# Patient Record
Sex: FEMALE | Race: BLACK OR AFRICAN AMERICAN | NOT HISPANIC OR LATINO | URBAN - METROPOLITAN AREA
[De-identification: names, ages, dates, MRNs, and addresses within clinical notes are randomized per-mention and may not be internally consistent; named-entity substitution may affect disease eponyms.]

---

## 2017-05-01 ENCOUNTER — OUTPATIENT (OUTPATIENT)
Dept: OUTPATIENT SERVICES | Facility: HOSPITAL | Age: 77
LOS: 1 days | End: 2017-05-01
Payer: MEDICAID

## 2017-05-04 DIAGNOSIS — R69 ILLNESS, UNSPECIFIED: ICD-10-CM

## 2017-06-01 PROCEDURE — G9001: CPT

## 2018-02-01 ENCOUNTER — OUTPATIENT (OUTPATIENT)
Dept: OUTPATIENT SERVICES | Facility: HOSPITAL | Age: 78
LOS: 1 days | End: 2018-02-01
Payer: MEDICAID

## 2018-02-06 ENCOUNTER — INPATIENT (INPATIENT)
Facility: HOSPITAL | Age: 78
LOS: 2 days | Discharge: HOME HEALTH | End: 2018-02-09
Attending: INTERNAL MEDICINE | Admitting: INTERNAL MEDICINE
Payer: MEDICARE

## 2018-02-06 ENCOUNTER — EMERGENCY (EMERGENCY)
Facility: HOSPITAL | Age: 78
LOS: 1 days | Discharge: STILL ADMITTED OR ADMINISTRATIVE | End: 2018-02-06
Admitting: EMERGENCY MEDICINE

## 2018-02-06 VITALS
TEMPERATURE: 98 F | OXYGEN SATURATION: 100 % | HEART RATE: 71 BPM | SYSTOLIC BLOOD PRESSURE: 180 MMHG | DIASTOLIC BLOOD PRESSURE: 63 MMHG | RESPIRATION RATE: 18 BRPM

## 2018-02-06 VITALS
RESPIRATION RATE: 15 BRPM | OXYGEN SATURATION: 100 % | DIASTOLIC BLOOD PRESSURE: 60 MMHG | SYSTOLIC BLOOD PRESSURE: 147 MMHG | HEART RATE: 80 BPM | TEMPERATURE: 98 F

## 2018-02-06 DIAGNOSIS — Z90.710 ACQUIRED ABSENCE OF BOTH CERVIX AND UTERUS: Chronic | ICD-10-CM

## 2018-02-06 DIAGNOSIS — Z90.49 ACQUIRED ABSENCE OF OTHER SPECIFIED PARTS OF DIGESTIVE TRACT: Chronic | ICD-10-CM

## 2018-02-06 LAB
ALBUMIN SERPL ELPH-MCNC: 4 G/DL — SIGNIFICANT CHANGE UP (ref 3.3–5)
ALP SERPL-CCNC: 85 U/L — SIGNIFICANT CHANGE UP (ref 40–120)
ALT FLD-CCNC: 10 U/L — SIGNIFICANT CHANGE UP (ref 4–33)
APTT BLD: 25.6 SEC — LOW (ref 27.5–37.4)
AST SERPL-CCNC: 14 U/L — SIGNIFICANT CHANGE UP (ref 4–32)
BASOPHILS # BLD AUTO: 0.08 K/UL — SIGNIFICANT CHANGE UP (ref 0–0.2)
BASOPHILS NFR BLD AUTO: 0.7 % — SIGNIFICANT CHANGE UP (ref 0–2)
BILIRUB SERPL-MCNC: 0.5 MG/DL — SIGNIFICANT CHANGE UP (ref 0.2–1.2)
BUN SERPL-MCNC: 8 MG/DL — SIGNIFICANT CHANGE UP (ref 7–23)
CALCIUM SERPL-MCNC: 9.4 MG/DL — SIGNIFICANT CHANGE UP (ref 8.4–10.5)
CHLORIDE SERPL-SCNC: 99 MMOL/L — SIGNIFICANT CHANGE UP (ref 98–107)
CO2 SERPL-SCNC: 29 MMOL/L — SIGNIFICANT CHANGE UP (ref 22–31)
CREAT SERPL-MCNC: 0.75 MG/DL — SIGNIFICANT CHANGE UP (ref 0.5–1.3)
EOSINOPHIL # BLD AUTO: 0.13 K/UL — SIGNIFICANT CHANGE UP (ref 0–0.5)
EOSINOPHIL NFR BLD AUTO: 1.1 % — SIGNIFICANT CHANGE UP (ref 0–6)
GLUCOSE SERPL-MCNC: 185 MG/DL — HIGH (ref 70–99)
HCT VFR BLD CALC: 38.4 % — SIGNIFICANT CHANGE UP (ref 34.5–45)
HGB BLD-MCNC: 12.4 G/DL — SIGNIFICANT CHANGE UP (ref 11.5–15.5)
IMM GRANULOCYTES # BLD AUTO: 0.05 # — SIGNIFICANT CHANGE UP
IMM GRANULOCYTES NFR BLD AUTO: 0.4 % — SIGNIFICANT CHANGE UP (ref 0–1.5)
INR BLD: 1.01 — SIGNIFICANT CHANGE UP (ref 0.88–1.17)
LYMPHOCYTES # BLD AUTO: 18.4 % — SIGNIFICANT CHANGE UP (ref 13–44)
LYMPHOCYTES # BLD AUTO: 2.21 K/UL — SIGNIFICANT CHANGE UP (ref 1–3.3)
MCHC RBC-ENTMCNC: 26.4 PG — LOW (ref 27–34)
MCHC RBC-ENTMCNC: 32.3 % — SIGNIFICANT CHANGE UP (ref 32–36)
MCV RBC AUTO: 81.7 FL — SIGNIFICANT CHANGE UP (ref 80–100)
MONOCYTES # BLD AUTO: 1.09 K/UL — HIGH (ref 0–0.9)
MONOCYTES NFR BLD AUTO: 9.1 % — SIGNIFICANT CHANGE UP (ref 2–14)
NEUTROPHILS # BLD AUTO: 8.46 K/UL — HIGH (ref 1.8–7.4)
NEUTROPHILS NFR BLD AUTO: 70.3 % — SIGNIFICANT CHANGE UP (ref 43–77)
NRBC # FLD: 0 — SIGNIFICANT CHANGE UP
PLATELET # BLD AUTO: 278 K/UL — SIGNIFICANT CHANGE UP (ref 150–400)
PMV BLD: 11.4 FL — SIGNIFICANT CHANGE UP (ref 7–13)
POTASSIUM SERPL-MCNC: 3.8 MMOL/L — SIGNIFICANT CHANGE UP (ref 3.5–5.3)
POTASSIUM SERPL-SCNC: 3.8 MMOL/L — SIGNIFICANT CHANGE UP (ref 3.5–5.3)
PROT SERPL-MCNC: 7.4 G/DL — SIGNIFICANT CHANGE UP (ref 6–8.3)
PROTHROM AB SERPL-ACNC: 11.6 SEC — SIGNIFICANT CHANGE UP (ref 9.8–13.1)
RBC # BLD: 4.7 M/UL — SIGNIFICANT CHANGE UP (ref 3.8–5.2)
RBC # FLD: 13.3 % — SIGNIFICANT CHANGE UP (ref 10.3–14.5)
SODIUM SERPL-SCNC: 140 MMOL/L — SIGNIFICANT CHANGE UP (ref 135–145)
WBC # BLD: 12.02 K/UL — HIGH (ref 3.8–10.5)
WBC # FLD AUTO: 12.02 K/UL — HIGH (ref 3.8–10.5)

## 2018-02-06 PROCEDURE — 73060 X-RAY EXAM OF HUMERUS: CPT | Mod: 26,LT

## 2018-02-06 PROCEDURE — 71045 X-RAY EXAM CHEST 1 VIEW: CPT | Mod: 26

## 2018-02-06 PROCEDURE — 72170 X-RAY EXAM OF PELVIS: CPT | Mod: 26

## 2018-02-06 PROCEDURE — 70450 CT HEAD/BRAIN W/O DYE: CPT | Mod: 26

## 2018-02-06 PROCEDURE — 73030 X-RAY EXAM OF SHOULDER: CPT | Mod: 26,LT

## 2018-02-06 RX ORDER — SODIUM CHLORIDE 9 MG/ML
1000 INJECTION INTRAMUSCULAR; INTRAVENOUS; SUBCUTANEOUS ONCE
Qty: 0 | Refills: 0 | Status: COMPLETED | OUTPATIENT
Start: 2018-02-06 | End: 2018-02-06

## 2018-02-06 RX ORDER — MORPHINE SULFATE 50 MG/1
4 CAPSULE, EXTENDED RELEASE ORAL ONCE
Qty: 0 | Refills: 0 | Status: DISCONTINUED | OUTPATIENT
Start: 2018-02-06 | End: 2018-02-06

## 2018-02-06 RX ADMIN — SODIUM CHLORIDE 1000 MILLILITER(S): 9 INJECTION INTRAMUSCULAR; INTRAVENOUS; SUBCUTANEOUS at 22:29

## 2018-02-06 RX ADMIN — MORPHINE SULFATE 4 MILLIGRAM(S): 50 CAPSULE, EXTENDED RELEASE ORAL at 22:10

## 2018-02-06 NOTE — ED ADULT NURSE NOTE - CHIEF COMPLAINT QUOTE
PT brought in by EMS for mechanical fall with injury to L side.  Pt LWOBE from [facility] ED to go to [facility] earlier today and while at [facility], pt had a syncopal episode with urinary incontinence.  As per family, pt is back to baseline "for the most part but still a little groggy".  PMHx:  HTN, DM

## 2018-02-06 NOTE — ED ADULT TRIAGE NOTE - CHIEF COMPLAINT QUOTE
PT brought in by EMS for mechanical fall with injury to L side.  Pt LWOBE from [facility] ED to go to [facility] earlier today and while at [facility], pt had a syncopal episode with urinary incontinence.  As per family, pt is back to baseline "for the most part but still a little groggy".  PMHx:  HTN, DM

## 2018-02-06 NOTE — ED PROVIDER NOTE - CARDIAC, MLM
Reviewed report generated by the [facility]. Does not demonstrate aberrancies or inconsistencies with regard to the prescribing of controlled medications to this patient by other providers. Last filled Ativan 12/26/2017 per . Normal rate, regular rhythm.  Heart sounds S1, S2.  No murmurs, rubs or gallops.

## 2018-02-06 NOTE — ED PROVIDER NOTE - MEDICAL DECISION MAKING DETAILS
pt w/ s/p fall and syncope, concern humerus fx, eval ICH, cranial fx, admit for syncope evaluation. pt w/ s/p fall and syncope, concern humerus fx, eval ICH, cranial fx, admit for syncope evaluation. - low suspicion for disssection, acs

## 2018-02-06 NOTE — ED PROVIDER NOTE - OBJECTIVE STATEMENT
76 y/o F w/ Hx DM, HTN pf fall.  Pt was doing laundry in AM, developed L sided lower back pain, tried heating pad w/ minimal relief, was walking L leg locked and fell down landing on L shoulder.  Severe pain to L shoulder, increased w/ movement.  Came to ED, LWOBE, went to urgent care, while standing for xray, sudden LOC, unknown head trauma, + urinary incontinence, returned to baseline immediately following.  No similar prior events.  No recent fever, cough, CP, SOB, urinary/bowel changes.  On ASA

## 2018-02-06 NOTE — ED PROVIDER NOTE - ATTENDING CONTRIBUTION TO CARE
agree with resident note  76 y/o F w/ Hx DM, HTN who presents for syncopal episode.  Was doing laundry this morning when left leg gave out and she fell landing on left shoulder.  Came to ED to get checked out, waited in waiting room for a prolonged time and left to go to [facility].  Did not eat during wait (per family that is normal for her)  While standing for xray at [facility] had syncopal episode.  FS checked there was normal.    PE: uncomfortable due to pain in left shoulder; NCAT; VSS; CTAB/L; s1 s2 no m/r/g abd soft/NT/ND ext: no edema Neuro: CNs intact 5/5 motor UE and LE; sensation intact; gait stable

## 2018-02-06 NOTE — ED ADULT NURSE NOTE - CHIEF COMPLAINT QUOTE
Pt c/o left lower back pain since last night.  Was walking today and left leg gave out and she fell onto left side.  C/O left arm and LLE pain.  No LOC.  Appears comfortable

## 2018-02-06 NOTE — ED PROVIDER NOTE - PROGRESS NOTE DETAILS
pt now more confused than baseline, no focal deficits - d/w CT, requested priority CT head [facility] care pt, d/w A team, text page telePA

## 2018-02-06 NOTE — ED ADULT NURSE NOTE - OBJECTIVE STATEMENT
pt received to room 22 pt comes to ED pt is A&0x3 pt comes to ED for possible syncopal episode. pt was seen at [facility] but Lwobed pt went to urgent care after and had a possible syncopal episode. pt states he leg buckled and she fell down. pt is complaining of back pain L arm pain pt VSS 20 g placed in R AC labs were drawn and sent VSS Will continue to monitor the pt

## 2018-02-07 DIAGNOSIS — G47.33 OBSTRUCTIVE SLEEP APNEA (ADULT) (PEDIATRIC): ICD-10-CM

## 2018-02-07 DIAGNOSIS — I10 ESSENTIAL (PRIMARY) HYPERTENSION: ICD-10-CM

## 2018-02-07 DIAGNOSIS — R55 SYNCOPE AND COLLAPSE: ICD-10-CM

## 2018-02-07 DIAGNOSIS — Z29.9 ENCOUNTER FOR PROPHYLACTIC MEASURES, UNSPECIFIED: ICD-10-CM

## 2018-02-07 DIAGNOSIS — E11.9 TYPE 2 DIABETES MELLITUS WITHOUT COMPLICATIONS: ICD-10-CM

## 2018-02-07 DIAGNOSIS — Z79.899 OTHER LONG TERM (CURRENT) DRUG THERAPY: ICD-10-CM

## 2018-02-07 LAB
ALBUMIN SERPL ELPH-MCNC: 3.8 G/DL — SIGNIFICANT CHANGE UP (ref 3.3–5)
ALP SERPL-CCNC: 77 U/L — SIGNIFICANT CHANGE UP (ref 40–120)
ALT FLD-CCNC: 8 U/L — SIGNIFICANT CHANGE UP (ref 4–33)
APPEARANCE UR: CLEAR — SIGNIFICANT CHANGE UP
AST SERPL-CCNC: 14 U/L — SIGNIFICANT CHANGE UP (ref 4–32)
BASOPHILS # BLD AUTO: 0.07 K/UL — SIGNIFICANT CHANGE UP (ref 0–0.2)
BASOPHILS NFR BLD AUTO: 0.6 % — SIGNIFICANT CHANGE UP (ref 0–2)
BILIRUB SERPL-MCNC: 0.6 MG/DL — SIGNIFICANT CHANGE UP (ref 0.2–1.2)
BILIRUB UR-MCNC: NEGATIVE — SIGNIFICANT CHANGE UP
BLOOD UR QL VISUAL: NEGATIVE — SIGNIFICANT CHANGE UP
BUN SERPL-MCNC: 8 MG/DL — SIGNIFICANT CHANGE UP (ref 7–23)
CALCIUM SERPL-MCNC: 8.9 MG/DL — SIGNIFICANT CHANGE UP (ref 8.4–10.5)
CHLORIDE SERPL-SCNC: 100 MMOL/L — SIGNIFICANT CHANGE UP (ref 98–107)
CHOLEST SERPL-MCNC: 172 MG/DL — SIGNIFICANT CHANGE UP (ref 120–199)
CK MB BLD-MCNC: 1.2 NG/ML — SIGNIFICANT CHANGE UP (ref 1–4.7)
CK MB BLD-MCNC: 1.23 NG/ML — SIGNIFICANT CHANGE UP (ref 1–4.7)
CK MB BLD-MCNC: SIGNIFICANT CHANGE UP (ref 0–2.5)
CK SERPL-CCNC: 113 U/L — SIGNIFICANT CHANGE UP (ref 25–170)
CK SERPL-CCNC: 99 U/L — SIGNIFICANT CHANGE UP (ref 25–170)
CO2 SERPL-SCNC: 26 MMOL/L — SIGNIFICANT CHANGE UP (ref 22–31)
COLOR SPEC: SIGNIFICANT CHANGE UP
CREAT SERPL-MCNC: 0.73 MG/DL — SIGNIFICANT CHANGE UP (ref 0.5–1.3)
EOSINOPHIL # BLD AUTO: 0.04 K/UL — SIGNIFICANT CHANGE UP (ref 0–0.5)
EOSINOPHIL NFR BLD AUTO: 0.3 % — SIGNIFICANT CHANGE UP (ref 0–6)
GLUCOSE BLDC GLUCOMTR-MCNC: 121 MG/DL — HIGH (ref 70–99)
GLUCOSE BLDC GLUCOMTR-MCNC: 142 MG/DL — HIGH (ref 70–99)
GLUCOSE BLDC GLUCOMTR-MCNC: 205 MG/DL — HIGH (ref 70–99)
GLUCOSE SERPL-MCNC: 209 MG/DL — HIGH (ref 70–99)
GLUCOSE UR-MCNC: 50 — HIGH
HCT VFR BLD CALC: 35.8 % — SIGNIFICANT CHANGE UP (ref 34.5–45)
HDLC SERPL-MCNC: 68 MG/DL — HIGH (ref 45–65)
HGB BLD-MCNC: 11.6 G/DL — SIGNIFICANT CHANGE UP (ref 11.5–15.5)
IMM GRANULOCYTES # BLD AUTO: 0.05 # — SIGNIFICANT CHANGE UP
IMM GRANULOCYTES NFR BLD AUTO: 0.4 % — SIGNIFICANT CHANGE UP (ref 0–1.5)
KETONES UR-MCNC: NEGATIVE — SIGNIFICANT CHANGE UP
LEUKOCYTE ESTERASE UR-ACNC: HIGH
LIPID PNL WITH DIRECT LDL SERPL: 111 MG/DL — SIGNIFICANT CHANGE UP
LYMPHOCYTES # BLD AUTO: 17.9 % — SIGNIFICANT CHANGE UP (ref 13–44)
LYMPHOCYTES # BLD AUTO: 2.21 K/UL — SIGNIFICANT CHANGE UP (ref 1–3.3)
MCHC RBC-ENTMCNC: 26.5 PG — LOW (ref 27–34)
MCHC RBC-ENTMCNC: 32.4 % — SIGNIFICANT CHANGE UP (ref 32–36)
MCV RBC AUTO: 81.7 FL — SIGNIFICANT CHANGE UP (ref 80–100)
MONOCYTES # BLD AUTO: 0.78 K/UL — SIGNIFICANT CHANGE UP (ref 0–0.9)
MONOCYTES NFR BLD AUTO: 6.3 % — SIGNIFICANT CHANGE UP (ref 2–14)
NEUTROPHILS # BLD AUTO: 9.23 K/UL — HIGH (ref 1.8–7.4)
NEUTROPHILS NFR BLD AUTO: 74.5 % — SIGNIFICANT CHANGE UP (ref 43–77)
NITRITE UR-MCNC: NEGATIVE — SIGNIFICANT CHANGE UP
NON-SQ EPI CELLS # UR AUTO: <1 — SIGNIFICANT CHANGE UP
NRBC # FLD: 0 — SIGNIFICANT CHANGE UP
PH UR: 8 — SIGNIFICANT CHANGE UP (ref 4.6–8)
PLATELET # BLD AUTO: 253 K/UL — SIGNIFICANT CHANGE UP (ref 150–400)
PMV BLD: 11.5 FL — SIGNIFICANT CHANGE UP (ref 7–13)
POTASSIUM SERPL-MCNC: 4.2 MMOL/L — SIGNIFICANT CHANGE UP (ref 3.5–5.3)
POTASSIUM SERPL-SCNC: 4.2 MMOL/L — SIGNIFICANT CHANGE UP (ref 3.5–5.3)
PROT SERPL-MCNC: 6.9 G/DL — SIGNIFICANT CHANGE UP (ref 6–8.3)
PROT UR-MCNC: NEGATIVE MG/DL — SIGNIFICANT CHANGE UP
RBC # BLD: 4.38 M/UL — SIGNIFICANT CHANGE UP (ref 3.8–5.2)
RBC # FLD: 13.2 % — SIGNIFICANT CHANGE UP (ref 10.3–14.5)
RBC CASTS # UR COMP ASSIST: SIGNIFICANT CHANGE UP (ref 0–?)
SODIUM SERPL-SCNC: 139 MMOL/L — SIGNIFICANT CHANGE UP (ref 135–145)
SP GR SPEC: 1.01 — SIGNIFICANT CHANGE UP (ref 1–1.04)
SQUAMOUS # UR AUTO: SIGNIFICANT CHANGE UP
TRIGL SERPL-MCNC: 62 MG/DL — SIGNIFICANT CHANGE UP (ref 10–149)
TROPONIN T SERPL-MCNC: < 0.06 NG/ML — SIGNIFICANT CHANGE UP (ref 0–0.06)
TROPONIN T SERPL-MCNC: < 0.06 NG/ML — SIGNIFICANT CHANGE UP (ref 0–0.06)
UROBILINOGEN FLD QL: NORMAL MG/DL — SIGNIFICANT CHANGE UP
WBC # BLD: 12.38 K/UL — HIGH (ref 3.8–10.5)
WBC # FLD AUTO: 12.38 K/UL — HIGH (ref 3.8–10.5)
WBC UR QL: HIGH (ref 0–?)

## 2018-02-07 PROCEDURE — 93880 EXTRACRANIAL BILAT STUDY: CPT | Mod: 26

## 2018-02-07 PROCEDURE — 99223 1ST HOSP IP/OBS HIGH 75: CPT | Mod: GC

## 2018-02-07 RX ORDER — GLUCAGON INJECTION, SOLUTION 0.5 MG/.1ML
1 INJECTION, SOLUTION SUBCUTANEOUS ONCE
Qty: 0 | Refills: 0 | Status: DISCONTINUED | OUTPATIENT
Start: 2018-02-07 | End: 2018-02-09

## 2018-02-07 RX ORDER — DEXTROSE 50 % IN WATER 50 %
12.5 SYRINGE (ML) INTRAVENOUS ONCE
Qty: 0 | Refills: 0 | Status: DISCONTINUED | OUTPATIENT
Start: 2018-02-07 | End: 2018-02-09

## 2018-02-07 RX ORDER — INSULIN LISPRO 100/ML
VIAL (ML) SUBCUTANEOUS AT BEDTIME
Qty: 0 | Refills: 0 | Status: DISCONTINUED | OUTPATIENT
Start: 2018-02-07 | End: 2018-02-09

## 2018-02-07 RX ORDER — INSULIN LISPRO 100/ML
VIAL (ML) SUBCUTANEOUS
Qty: 0 | Refills: 0 | Status: DISCONTINUED | OUTPATIENT
Start: 2018-02-07 | End: 2018-02-09

## 2018-02-07 RX ORDER — DEXTROSE 50 % IN WATER 50 %
25 SYRINGE (ML) INTRAVENOUS ONCE
Qty: 0 | Refills: 0 | Status: DISCONTINUED | OUTPATIENT
Start: 2018-02-07 | End: 2018-02-09

## 2018-02-07 RX ORDER — ENOXAPARIN SODIUM 100 MG/ML
40 INJECTION SUBCUTANEOUS EVERY 24 HOURS
Qty: 0 | Refills: 0 | Status: DISCONTINUED | OUTPATIENT
Start: 2018-02-07 | End: 2018-02-09

## 2018-02-07 RX ORDER — DEXTROSE 50 % IN WATER 50 %
1 SYRINGE (ML) INTRAVENOUS ONCE
Qty: 0 | Refills: 0 | Status: DISCONTINUED | OUTPATIENT
Start: 2018-02-07 | End: 2018-02-09

## 2018-02-07 RX ORDER — SODIUM CHLORIDE 9 MG/ML
1000 INJECTION, SOLUTION INTRAVENOUS
Qty: 0 | Refills: 0 | Status: DISCONTINUED | OUTPATIENT
Start: 2018-02-07 | End: 2018-02-09

## 2018-02-07 RX ORDER — ACETAMINOPHEN 500 MG
650 TABLET ORAL EVERY 6 HOURS
Qty: 0 | Refills: 0 | Status: DISCONTINUED | OUTPATIENT
Start: 2018-02-07 | End: 2018-02-09

## 2018-02-07 RX ORDER — ASPIRIN/CALCIUM CARB/MAGNESIUM 324 MG
81 TABLET ORAL DAILY
Qty: 0 | Refills: 0 | Status: DISCONTINUED | OUTPATIENT
Start: 2018-02-07 | End: 2018-02-09

## 2018-02-07 RX ORDER — LISINOPRIL 2.5 MG/1
20 TABLET ORAL DAILY
Qty: 0 | Refills: 0 | Status: DISCONTINUED | OUTPATIENT
Start: 2018-02-07 | End: 2018-02-09

## 2018-02-07 RX ORDER — ATORVASTATIN CALCIUM 80 MG/1
40 TABLET, FILM COATED ORAL AT BEDTIME
Qty: 0 | Refills: 0 | Status: DISCONTINUED | OUTPATIENT
Start: 2018-02-07 | End: 2018-02-09

## 2018-02-07 RX ORDER — AMLODIPINE BESYLATE 2.5 MG/1
10 TABLET ORAL DAILY
Qty: 0 | Refills: 0 | Status: DISCONTINUED | OUTPATIENT
Start: 2018-02-07 | End: 2018-02-09

## 2018-02-07 RX ADMIN — LISINOPRIL 20 MILLIGRAM(S): 2.5 TABLET ORAL at 07:02

## 2018-02-07 RX ADMIN — Medication 650 MILLIGRAM(S): at 13:50

## 2018-02-07 RX ADMIN — MORPHINE SULFATE 4 MILLIGRAM(S): 50 CAPSULE, EXTENDED RELEASE ORAL at 00:30

## 2018-02-07 RX ADMIN — Medication 650 MILLIGRAM(S): at 13:25

## 2018-02-07 RX ADMIN — ATORVASTATIN CALCIUM 40 MILLIGRAM(S): 80 TABLET, FILM COATED ORAL at 21:20

## 2018-02-07 RX ADMIN — AMLODIPINE BESYLATE 10 MILLIGRAM(S): 2.5 TABLET ORAL at 07:02

## 2018-02-07 RX ADMIN — ENOXAPARIN SODIUM 40 MILLIGRAM(S): 100 INJECTION SUBCUTANEOUS at 21:19

## 2018-02-07 RX ADMIN — Medication 81 MILLIGRAM(S): at 13:50

## 2018-02-07 NOTE — H&P ADULT - PROBLEM SELECTOR PLAN 1
-CTH within normal limits and prelim reads of x-rays are WNL  -continue with telemetry monitoring to r/o arrhythmia   -Rodger negative x 2; patient without chest pain  -check orthostatic vital signs, monitor VS  -less likely seizure; no seizure history and history not c/w seizure  -consider vasovagal syncope in setting of long standing/wait times in urgent care and ER  -consider hypoglycemia given patient on multiple oral hypoglycemics and only consumed breakfast  -physical therapy evaluation. F/u TTE however consider obtaining records from OSH as patient reports having complete work up before. -CTH within normal limits and prelim reads of x-rays are WNL  -continue with telemetry monitoring to r/o arrhythmia   -Rodger negative x 2; patient without chest pain  -check orthostatic vital signs, monitor VS  -less likely seizure; no seizure history and history not c/w seizure  -consider vasovagal syncope in setting of long standing/wait times in urgent care and ER  -consider hypoglycemia given patient on multiple oral hypoglycemics and only consumed breakfast  -physical therapy evaluation. F/u TTE however consider obtaining records from OSH as patient reports having complete work up before.  -obtain collateral info from pt daughter regarding syncopal episode

## 2018-02-07 NOTE — H&P ADULT - PROBLEM SELECTOR PLAN 2
-hold oral hypoglycemic medications  -insulin sliding scale  -consistent carbohydrate diet  -ASCVD risk 36.9%; patient should be on high dose statin.

## 2018-02-07 NOTE — CONSULT NOTE ADULT - SUBJECTIVE AND OBJECTIVE BOX
CHIEF COMPLAINT:Patient is a 77y old  Female who presents with a chief complaint of syncope (07 Feb 2018 05:26)      HISTORY OF PRESENT ILLNESS:  77F with T2DM, HTN, osteoporosis, EDUAR on CPAP, diverticulosis s/p resection, hx hysterectomy admitted with syncope. she states she went to do laundry and on carrying the laundry basket felt that she could not move her leg and fell down on her left shoulder. She then came to the ER and reports waiting several hours without being seen and thus went to urgent care where she was standing for chest x-ray and passed out  no cp or sob   occ dizziness   PAST MEDICAL & SURGICAL HISTORY:  HTN  Dyslipidemia  Diabetes mellitus type II  S/P hysterectomy  S/P appendectomy          MEDICATIONS:  amLODIPine   Tablet 10 milliGRAM(s) Oral daily  aspirin enteric coated 81 milliGRAM(s) Oral daily  enoxaparin Injectable 40 milliGRAM(s) SubCutaneous every 24 hours  lisinopril 20 milliGRAM(s) Oral daily        acetaminophen   Tablet. 650 milliGRAM(s) Oral every 6 hours PRN      atorvastatin 40 milliGRAM(s) Oral at bedtime  dextrose 50% Injectable 12.5 Gram(s) IV Push once  dextrose 50% Injectable 25 Gram(s) IV Push once  dextrose 50% Injectable 25 Gram(s) IV Push once  dextrose Gel 1 Dose(s) Oral once PRN  glucagon  Injectable 1 milliGRAM(s) IntraMuscular once PRN  insulin lispro (HumaLOG) corrective regimen sliding scale   SubCutaneous three times a day before meals  insulin lispro (HumaLOG) corrective regimen sliding scale   SubCutaneous at bedtime    dextrose 5%. 1000 milliLiter(s) IV Continuous <Continuous>      FAMILY HISTORY:  No pertinent family history in first degree relatives      Non-contributory    SOCIAL HISTORY:    No tobacco, drugs or etoh    Allergies    No Known Allergies    Intolerances    	    REVIEW OF SYSTEMS:  as above  The rest of the 14 points ROS reviewed and except above they are unremarkable.        PHYSICAL EXAM:  T(C): 36.7 (02-06-18 @ 22:03), Max: 36.9 (02-06-18 @ 21:11)  HR: 68 (02-07-18 @ 11:43) (59 - 81)  BP: 155/60 (02-07-18 @ 11:43) (140/60 - 180/63)  RR: 16 (02-07-18 @ 11:43) (15 - 18)  SpO2: 100% (02-07-18 @ 11:43) (100% - 100%)  Wt(kg): --  I&O's Summary    JVP: Normal  Neck: supple  Lung: clear   CV: S1 S2 , Murmur: 3/6 renaldo at rusb   Abd: soft  Ext: No edema  neuro: Awake / alert  Psych: flat affect  Skin: normal     LABS/DATA:    TELEMETRY: 	    ECG:  	< from: 12 Lead ECG (02.06.18 @ 21:31) >    Diagnosis Line Normal sinus rhythm  Nonspecific T wave abnormality  Abnormal ECG    < end of copied text >     	  CARDIAC MARKERS:  Troponin T, Serum: < 0.06 ng/mL (02-07 @ 04:12)  Troponin T, Serum: < 0.06 ng/mL (02-06 @ 21:57)      CKMB: 1.20 ng/mL (02-07 @ 04:12)  CKMB: 1.23 ng/mL (02-06 @ 21:57)    CKMB Relative Index: Test not performed (02-06 @ 21:57)                            11.6   12.38 )-----------( 253      ( 07 Feb 2018 04:12 )             35.8     02-07    139  |  100  |  8   ----------------------------<  209<H>  4.2   |  26  |  0.73    Ca    8.9      07 Feb 2018 04:12    TPro  6.9  /  Alb  3.8  /  TBili  0.6  /  DBili  x   /  AST  14  /  ALT  8   /  AlkPhos  77  02-07    proBNP:   Lipid Profile:   HgA1c:   TSH:   < from: Transthoracic Echocardiogram w/Doppler (03.04.13 @ 11:00) >  ------------------------------------------------------------------------  CONCLUSIONS:  1. Mild concentric left ventricular hypertrophy.  2. Normal left ventricular systolic function. No segmental  wall motion abnormalities.    < end of copied text >

## 2018-02-07 NOTE — H&P ADULT - HISTORY OF PRESENT ILLNESS
77F with T2DM, HTN, osteoporosis, EDUAR on CPAP, diverticulosis s/p resection, hx hysterectomy who comes to ER after syncopal episode. Patient states she had back pain that started one day ago; pain was located in paraspinal region of lower back, non radiating, not associated with numbness, urinary retention or incontinence. She reports no trauma or heavy lifting that precipitated back pain. She then went to do laundry and on carrying the laundry basket felt that she could not move her leg and fell down on her left shoulder. She then came to the ER and reports waiting several hours without being seen and thus went to urgent care where she was standing for chest x-ray and passed out. She reports no presyncopal symptoms; no CP, dizziness, change in hearing or vision, diaphoresis, palpitations. When she passed out she was lying on the floor and expressed urge to urinate; urgent care staff did not want her to stand up and thus she urinated on herself. She insists she has no episodes of incontinence. This has happened to her once before however she did not pass out but experienced presyncopal symptoms such as diaphoresis and dizziness while sitting down and doing her granddaughter's hair that resolved with some fresh air. She was brought to [facility] at that time and reports having complete work up that was negative for any cause of syncope. She denies CP, SOB, fever, chills, abdominal pain, nausea, vomiting, diarrhea, dysuria. Currently she is feeling much better than when she came in.     In ER: T 98.4F, HR 80, /60, RR 15, SpO2 100% on RA. Patient received 1L NS, morphine 4 mg x 1.

## 2018-02-07 NOTE — CONSULT NOTE ADULT - ASSESSMENT
Syncope   ? vagal episode in setting of standing position   check orthostatic vitals  obtain echo and carotid doppler  monitor on tele    HTN  cont current meds for now    Dm  Monitor finger stick. Insulin coverage. Diabetic education and Diabetic diet. Consider nutrition consultation.

## 2018-02-07 NOTE — H&P ADULT - NSHPPHYSICALEXAM_GEN_ALL_CORE
Vital Signs Last 24 Hrs  T(C): 36.7 (06 Feb 2018 22:03), Max: 36.9 (06 Feb 2018 21:11)  T(F): 98.1 (06 Feb 2018 22:03), Max: 98.4 (06 Feb 2018 21:11)  HR: 78 (07 Feb 2018 00:30) (71 - 81)  BP: 142/58 (07 Feb 2018 00:30) (140/60 - 180/63)  BP(mean): --  RR: 18 (07 Feb 2018 00:30) (15 - 18)  SpO2: 100% (07 Feb 2018 00:30) (100% - 100%)    Constitutional: no acute distress, very pleasant patient, very talkative  Eyes: clear conjunctiva  ENMT: moist oral mucosa  Respiratory: CTA b/l  Cardiovascular: S1, S2, RR  Gastrointestinal: soft, mild tenderness in epigastrium  Genitourinary: no CVA tenderness, no suprapubic tenderness  Extremities: no LE edema  Neurological: AAO x 4  Skin: warm, dry  Musculoskeletal: tenderness on palpation of paraspinal muscles  Psychiatric: normal affect.

## 2018-02-07 NOTE — H&P ADULT - NSHPREVIEWOFSYSTEMS_GEN_ALL_CORE
General: no fever, no chills  Ophthalmologic: no change in vision  ENMT: no sore throat  Respiratory and Thorax: no SOB, no cough  Cardiovascular: no CP  Gastrointestinal: no abd pain, N/V/D  Genitourinary: no dysuria  Musculoskeletal: left shoulder pain  Neurological: no HA  Psychiatric: no anxiety/depression  Hematology/Lymphatics: no abnormal bleeding  Endocrine: no changes in weight

## 2018-02-07 NOTE — H&P ADULT - NSHPLABSRESULTS_GEN_ALL_CORE
11.6   12.38 )-----------( 253      ( 2018 04:12 )             35.8     02-    139  |  100  |  8   ----------------------------<  209<H>  4.2   |  26  |  0.73    Ca    8.9      2018 04:12    TPro  6.9  /  Alb  3.8  /  TBili  0.6  /  DBili  x   /  AST  14  /  ALT  8   /  AlkPhos  77  02-07        CAPILLARY BLOOD GLUCOSE        PT/INR - ( 2018 21:57 )   PT: 11.6 SEC;   INR: 1.01          PTT - ( 2018 21:57 )  PTT:25.6 SEC  Urinalysis Basic - ( 2018 00:30 )    Color: PLYEL / Appearance: CLEAR / S.011 / pH: 8.0  Gluc: 50 / Ketone: NEGATIVE  / Bili: NEGATIVE / Urobili: NORMAL mg/dL   Blood: NEGATIVE / Protein: NEGATIVE mg/dL / Nitrite: NEGATIVE   Leuk Esterase: LARGE / RBC: 2-5 / WBC 5-10   Sq Epi: OCC / Non Sq Epi: x / Bacteria: x    < from: CT Head No Cont (18 @ 23:44) >    FINDINGS:   There is no CT evidence of acute intracranial hemorrhage, extra-axial   collection, vasogenic edema, mass effect, midline shift, central   herniation, or hydrocephalus.     There is mild cerebral volume loss. There is mild patchy white matter   hypoattenuation which is nonspecific in etiology but likely related to   chronic microvascular ischemic disease.    The visualized paranasal sinuses are clear. The mastoid air cells and   middle ear cavities are clear.    The soft tissues of the scalp are unremarkable. The calvarium is intact.    IMPRESSION:   No CT evidence of acute intracranial hemorrhage, brain edema, or mass   effect.         < end of copied text >

## 2018-02-07 NOTE — H&P ADULT - ASSESSMENT
77F with T2DM, HTN, osteoporosis, EDUAR on CPAP, diverticulosis s/p resection, hx hysterectomy admitted with syncope.

## 2018-02-08 LAB
BACTERIA UR CULT: SIGNIFICANT CHANGE UP
BUN SERPL-MCNC: 13 MG/DL — SIGNIFICANT CHANGE UP (ref 7–23)
CALCIUM SERPL-MCNC: 9.4 MG/DL — SIGNIFICANT CHANGE UP (ref 8.4–10.5)
CHLORIDE SERPL-SCNC: 101 MMOL/L — SIGNIFICANT CHANGE UP (ref 98–107)
CO2 SERPL-SCNC: 25 MMOL/L — SIGNIFICANT CHANGE UP (ref 22–31)
CREAT SERPL-MCNC: 0.91 MG/DL — SIGNIFICANT CHANGE UP (ref 0.5–1.3)
GLUCOSE BLDC GLUCOMTR-MCNC: 127 MG/DL — HIGH (ref 70–99)
GLUCOSE BLDC GLUCOMTR-MCNC: 134 MG/DL — HIGH (ref 70–99)
GLUCOSE BLDC GLUCOMTR-MCNC: 169 MG/DL — HIGH (ref 70–99)
GLUCOSE BLDC GLUCOMTR-MCNC: 196 MG/DL — HIGH (ref 70–99)
GLUCOSE SERPL-MCNC: 159 MG/DL — HIGH (ref 70–99)
HBA1C BLD-MCNC: 4.6 % — SIGNIFICANT CHANGE UP (ref 4–5.6)
HCT VFR BLD CALC: 38 % — SIGNIFICANT CHANGE UP (ref 34.5–45)
HGB BLD-MCNC: 11.7 G/DL — SIGNIFICANT CHANGE UP (ref 11.5–15.5)
MAGNESIUM SERPL-MCNC: 1.8 MG/DL — SIGNIFICANT CHANGE UP (ref 1.6–2.6)
MCHC RBC-ENTMCNC: 25.3 PG — LOW (ref 27–34)
MCHC RBC-ENTMCNC: 30.8 % — LOW (ref 32–36)
MCV RBC AUTO: 82.1 FL — SIGNIFICANT CHANGE UP (ref 80–100)
NRBC # FLD: 0 — SIGNIFICANT CHANGE UP
PLATELET # BLD AUTO: 275 K/UL — SIGNIFICANT CHANGE UP (ref 150–400)
PMV BLD: 11.7 FL — SIGNIFICANT CHANGE UP (ref 7–13)
POTASSIUM SERPL-MCNC: 3.9 MMOL/L — SIGNIFICANT CHANGE UP (ref 3.5–5.3)
POTASSIUM SERPL-SCNC: 3.9 MMOL/L — SIGNIFICANT CHANGE UP (ref 3.5–5.3)
RBC # BLD: 4.63 M/UL — SIGNIFICANT CHANGE UP (ref 3.8–5.2)
RBC # FLD: 13.4 % — SIGNIFICANT CHANGE UP (ref 10.3–14.5)
SODIUM SERPL-SCNC: 139 MMOL/L — SIGNIFICANT CHANGE UP (ref 135–145)
SPECIMEN SOURCE: SIGNIFICANT CHANGE UP
TSH SERPL-MCNC: 0.81 UIU/ML — SIGNIFICANT CHANGE UP (ref 0.27–4.2)
WBC # BLD: 9.25 K/UL — SIGNIFICANT CHANGE UP (ref 3.8–10.5)
WBC # FLD AUTO: 9.25 K/UL — SIGNIFICANT CHANGE UP (ref 3.8–10.5)

## 2018-02-08 PROCEDURE — 72100 X-RAY EXAM L-S SPINE 2/3 VWS: CPT | Mod: 26

## 2018-02-08 RX ORDER — KETOROLAC TROMETHAMINE 30 MG/ML
15 SYRINGE (ML) INJECTION ONCE
Qty: 0 | Refills: 0 | Status: DISCONTINUED | OUTPATIENT
Start: 2018-02-08 | End: 2018-02-08

## 2018-02-08 RX ADMIN — ENOXAPARIN SODIUM 40 MILLIGRAM(S): 100 INJECTION SUBCUTANEOUS at 21:24

## 2018-02-08 RX ADMIN — Medication 15 MILLIGRAM(S): at 02:50

## 2018-02-08 RX ADMIN — Medication 81 MILLIGRAM(S): at 12:49

## 2018-02-08 RX ADMIN — ATORVASTATIN CALCIUM 40 MILLIGRAM(S): 80 TABLET, FILM COATED ORAL at 21:24

## 2018-02-08 RX ADMIN — Medication 1: at 16:52

## 2018-02-08 RX ADMIN — Medication 1: at 12:50

## 2018-02-08 RX ADMIN — LISINOPRIL 20 MILLIGRAM(S): 2.5 TABLET ORAL at 06:50

## 2018-02-08 RX ADMIN — AMLODIPINE BESYLATE 10 MILLIGRAM(S): 2.5 TABLET ORAL at 06:50

## 2018-02-08 NOTE — PHYSICAL THERAPY INITIAL EVALUATION ADULT - LEVEL OF INDEPENDENCE: SUPINE/SIT, REHAB EVAL
minimum assist (75% patients effort)/moderate assist (50% patients effort)/*though log roll to maintain spinal precautions

## 2018-02-08 NOTE — PHYSICAL THERAPY INITIAL EVALUATION ADULT - ACTIVE RANGE OF MOTION EXAMINATION, REHAB EVAL
Left shoulder flexion ~10 degrees 2/2 to pain/weakness, Left Elbow/hand/wrist WFL Left Knee flexion ~ 40 degrees; muscle guarding noted/Right UE Active ROM was WFL (within functional limits) Right LE Active ROM was WFL (within functional limits)/Left shoulder flexion ~10 degrees 2/2 to pain/weakness, Left Elbow/hand/wrist WFL Left Knee flexion ~ 40 degrees; muscle guarding noted/Right UE Active ROM was WFL (within functional limits)

## 2018-02-08 NOTE — PHYSICAL THERAPY INITIAL EVALUATION ADULT - ASR WT BEARING STATUS EVAL
No Weight bearing restrictions as per PT order; however Left UE/LE and right LE WBAT as per of PT/Left UE/Left LE/Right LE

## 2018-02-08 NOTE — PHYSICAL THERAPY INITIAL EVALUATION ADULT - PATIENT PROFILE REVIEW, REHAB EVAL
No Formal Activity Order in the computer; spoke with MADAI Madrid prior to PT evaluation--> Pt OK for PT consult/ Increase as Toelrated/yes

## 2018-02-08 NOTE — PHYSICAL THERAPY INITIAL EVALUATION ADULT - MANUAL MUSCLE TESTING RESULTS, REHAB EVAL
Right LE 4-/5, Right UE 4-/5, Left Shoulder 2-/5, Left Elbow/hand/wrist 3/5, Left LE 3-/5 Osteoporosis; Right LE 4-/5, Right UE 4-/5, Left Shoulder 2-/5, Left Elbow/hand/wrist 3/5, Left LE 3-/5/grossly assessed due to

## 2018-02-08 NOTE — PHYSICAL THERAPY INITIAL EVALUATION ADULT - ADDITIONAL COMMENTS
Pt reports that she lives in a private house with daughter with no stairs to negotiate. Prior to hospital admission pt was completely independent and used no assistive device with ambulation. Pt denies any recent falls other than the fall that brought her in.     Pt left comfortable on stretcher, NAD, all lines intact, all precautions maintained, RN aware/presents; and transporter near by.

## 2018-02-08 NOTE — PHYSICAL THERAPY INITIAL EVALUATION ADULT - DIAGNOSIS, PT EVAL
Pt admitted for syncope; X-ray negative for Left shoulder/Left pelvis; pt presents with decreased strength, decreased balance, and difficulty with ambulation/ antalgic gait .

## 2018-02-08 NOTE — PHYSICAL THERAPY INITIAL EVALUATION ADULT - PASSIVE RANGE OF MOTION EXAMINATION, REHAB EVAL
Left Shoulder flexion ~50 degrees 2/2 pain/weakness/Right UE Passive ROM was WFL (within functional limits)/bilateral lower extremity Passive ROM was WFL (within functional limits)

## 2018-02-09 ENCOUNTER — TRANSCRIPTION ENCOUNTER (OUTPATIENT)
Age: 78
End: 2018-02-09

## 2018-02-09 VITALS
HEART RATE: 87 BPM | SYSTOLIC BLOOD PRESSURE: 137 MMHG | TEMPERATURE: 98 F | RESPIRATION RATE: 17 BRPM | OXYGEN SATURATION: 98 % | DIASTOLIC BLOOD PRESSURE: 55 MMHG

## 2018-02-09 LAB
BUN SERPL-MCNC: 17 MG/DL — SIGNIFICANT CHANGE UP (ref 7–23)
CALCIUM SERPL-MCNC: 9.1 MG/DL — SIGNIFICANT CHANGE UP (ref 8.4–10.5)
CHLORIDE SERPL-SCNC: 102 MMOL/L — SIGNIFICANT CHANGE UP (ref 98–107)
CO2 SERPL-SCNC: 27 MMOL/L — SIGNIFICANT CHANGE UP (ref 22–31)
CREAT SERPL-MCNC: 0.89 MG/DL — SIGNIFICANT CHANGE UP (ref 0.5–1.3)
GLUCOSE BLDC GLUCOMTR-MCNC: 114 MG/DL — HIGH (ref 70–99)
GLUCOSE BLDC GLUCOMTR-MCNC: 171 MG/DL — HIGH (ref 70–99)
GLUCOSE BLDC GLUCOMTR-MCNC: 183 MG/DL — HIGH (ref 70–99)
GLUCOSE SERPL-MCNC: 131 MG/DL — HIGH (ref 70–99)
HCT VFR BLD CALC: 39.7 % — SIGNIFICANT CHANGE UP (ref 34.5–45)
HGB BLD-MCNC: 12.8 G/DL — SIGNIFICANT CHANGE UP (ref 11.5–15.5)
MAGNESIUM SERPL-MCNC: 1.8 MG/DL — SIGNIFICANT CHANGE UP (ref 1.6–2.6)
MCHC RBC-ENTMCNC: 26.7 PG — LOW (ref 27–34)
MCHC RBC-ENTMCNC: 32.2 % — SIGNIFICANT CHANGE UP (ref 32–36)
MCV RBC AUTO: 82.9 FL — SIGNIFICANT CHANGE UP (ref 80–100)
NRBC # FLD: 0 — SIGNIFICANT CHANGE UP
PLATELET # BLD AUTO: 268 K/UL — SIGNIFICANT CHANGE UP (ref 150–400)
PMV BLD: 11.4 FL — SIGNIFICANT CHANGE UP (ref 7–13)
POTASSIUM SERPL-MCNC: 4 MMOL/L — SIGNIFICANT CHANGE UP (ref 3.5–5.3)
POTASSIUM SERPL-SCNC: 4 MMOL/L — SIGNIFICANT CHANGE UP (ref 3.5–5.3)
RBC # BLD: 4.79 M/UL — SIGNIFICANT CHANGE UP (ref 3.8–5.2)
RBC # FLD: 13.4 % — SIGNIFICANT CHANGE UP (ref 10.3–14.5)
SODIUM SERPL-SCNC: 139 MMOL/L — SIGNIFICANT CHANGE UP (ref 135–145)
WBC # BLD: 7.38 K/UL — SIGNIFICANT CHANGE UP (ref 3.8–10.5)
WBC # FLD AUTO: 7.38 K/UL — SIGNIFICANT CHANGE UP (ref 3.8–10.5)

## 2018-02-09 PROCEDURE — 93306 TTE W/DOPPLER COMPLETE: CPT | Mod: 26

## 2018-02-09 RX ORDER — ATORVASTATIN CALCIUM 80 MG/1
1 TABLET, FILM COATED ORAL
Qty: 0 | Refills: 0 | COMMUNITY

## 2018-02-09 RX ORDER — ATORVASTATIN CALCIUM 80 MG/1
1 TABLET, FILM COATED ORAL
Qty: 30 | Refills: 0
Start: 2018-02-09 | End: 2018-03-10

## 2018-02-09 RX ADMIN — Medication 81 MILLIGRAM(S): at 12:54

## 2018-02-09 RX ADMIN — Medication 1: at 12:55

## 2018-02-09 RX ADMIN — LISINOPRIL 20 MILLIGRAM(S): 2.5 TABLET ORAL at 06:02

## 2018-02-09 RX ADMIN — Medication 1: at 16:36

## 2018-02-09 RX ADMIN — AMLODIPINE BESYLATE 10 MILLIGRAM(S): 2.5 TABLET ORAL at 06:02

## 2018-02-09 NOTE — DISCHARGE NOTE ADULT - CARE PLAN
Principal Discharge DX:	Syncope  Goal:	prevent fall  Assessment and plan of treatment:	continue medication as directed. follow up with your PMD in 2 weeks  Secondary Diagnosis:	Shoulder pain, left  Assessment and plan of treatment:	tylenol as needed for pain, continue physical therapy as needed. Your Xray are negative for fracture  Secondary Diagnosis:	Type 2 diabetes mellitus  Assessment and plan of treatment:	Continue diet modification. Avoid complex carbohydrates such as bread, pasta, cereal, white rice, white potatoes, etc. Avoid concentrated sugar as found in desserts, candy, soda, juice, etc. Consume a diet based on lean protein (chicken, fish) and vegetables.  Secondary Diagnosis:	Hypertension  Assessment and plan of treatment:	Continue medications as currently prescribed. Follow up with your PMD for further management of disease. Dash diet.

## 2018-02-09 NOTE — PROGRESS NOTE ADULT - PROBLEM SELECTOR PLAN 1
Check Echo   FU with cardio
Check Echo   cardio note appreciated
Check Echo   cardio note appreciated

## 2018-02-09 NOTE — PROGRESS NOTE ADULT - ASSESSMENT
77F with T2DM, HTN, osteoporosis, EDUAR on CPAP, diverticulosis s/p resection, hx hysterectomy admitted with syncope.
77F with T2DM, HTN, osteoporosis, EDUAR on CPAP, diverticulosis s/p resection, hx hysterectomy admitted with syncope.
Syncope   ? vagal episode in setting of standing position   normal orthostatic vitals   obtain echo and carotid doppler  monitor on tele    HTN  cont current meds for now    Dm  Monitor finger stick. Insulin coverage. Diabetic education and Diabetic diet. Consider nutrition consultation.
Syncope   ? vagal episode in setting of standing position   normal orthostatic vitals   obtain echo and carotid doppler  monitor on tele    HTN  cont current meds for now    Dm  Monitor finger stick. Insulin coverage. Diabetic education and Diabetic diet. Consider nutrition consultation.
77F with T2DM, HTN, osteoporosis, EDUAR on CPAP, diverticulosis s/p resection, hx hysterectomy admitted with syncope.

## 2018-02-09 NOTE — DISCHARGE NOTE ADULT - MEDICATION SUMMARY - MEDICATIONS TO TAKE
I will START or STAY ON the medications listed below when I get home from the hospital:    Aspir 81 oral delayed release tablet  -- 1 tab(s) by mouth once a day  -- Indication: For cad    lisinopril 20 mg oral tablet  -- 1 tab(s) by mouth once a day  -- Indication: For Htn    sertraline 50 mg oral tablet  -- 1 tab(s) by mouth once a day  -- Indication: For depression    Janumet 50 mg-1000 mg oral tablet  -- 1 tab(s) by mouth 2 times a day  -- Indication: For dm    pioglitazone 15 mg oral tablet  -- 1 tab(s) by mouth once a day  -- Indication: For dm    atorvastatin 40 mg oral tablet  -- 1 tab(s) by mouth once a day (at bedtime)  -- Indication: For Hld    alendronate 70 mg oral tablet  -- 1 tab(s) by mouth once a week  -- Indication: For OSteopenia    amLODIPine 10 mg oral tablet  -- 1 tab(s) by mouth once a day  -- Indication: For Htn

## 2018-02-09 NOTE — PROGRESS NOTE ADULT - PROBLEM SELECTOR PLAN 3
-check orthostatic vs  -c/w lisinopril and amlodipine

## 2018-02-09 NOTE — DISCHARGE NOTE ADULT - PLAN OF CARE
prevent fall continue medication as directed. follow up with your PMD in 2 weeks tylenol as needed for pain, continue physical therapy as needed. Your Xray are negative for fracture Continue diet modification. Avoid complex carbohydrates such as bread, pasta, cereal, white rice, white potatoes, etc. Avoid concentrated sugar as found in desserts, candy, soda, juice, etc. Consume a diet based on lean protein (chicken, fish) and vegetables. Continue medications as currently prescribed. Follow up with your PMD for further management of disease. Dash diet.

## 2018-02-09 NOTE — PROGRESS NOTE ADULT - PROBLEM SELECTOR PLAN 5
-lovenox for DVT ppx  PT recommending rehab
-obtain cpap settings from pts daughter and c/w CPAP qhs
-obtain cpap settings from pts daughter and c/w CPAP qhs

## 2018-02-09 NOTE — PROGRESS NOTE ADULT - PROBLEM SELECTOR PLAN 4
-verify medications with patient's daughter as patient unsure of meds
-verify medications with patient's daughter as patient unsure of meds
-obtain cpap settings from pts daughter and c/w CPAP qhs

## 2018-02-09 NOTE — PROGRESS NOTE ADULT - SUBJECTIVE AND OBJECTIVE BOX
77y            Female                                                                                                                                          PAST MEDICAL & SURGICAL HISTORY:  HTN  Dyslipidemia  Diabetes mellitus type II  S/P hysterectomy  S/P appendectomy        acetaminophen   Tablet. 650 milliGRAM(s) Oral every 6 hours PRN  amLODIPine   Tablet 10 milliGRAM(s) Oral daily  aspirin enteric coated 81 milliGRAM(s) Oral daily  atorvastatin 40 milliGRAM(s) Oral at bedtime  dextrose 5%. 1000 milliLiter(s) IV Continuous <Continuous>  dextrose 50% Injectable 12.5 Gram(s) IV Push once  dextrose 50% Injectable 25 Gram(s) IV Push once  dextrose 50% Injectable 25 Gram(s) IV Push once  dextrose Gel 1 Dose(s) Oral once PRN  enoxaparin Injectable 40 milliGRAM(s) SubCutaneous every 24 hours  glucagon  Injectable 1 milliGRAM(s) IntraMuscular once PRN  insulin lispro (HumaLOG) corrective regimen sliding scale   SubCutaneous three times a day before meals  insulin lispro (HumaLOG) corrective regimen sliding scale   SubCutaneous at bedtime  lisinopril 20 milliGRAM(s) Oral daily      REVIEW OF SYSTEMS  General: InNAD  Respiratory and Thorax: No SOB  Cardiovascular: No CP, No palpitations  Gastrointestinal: NON/V/D  Musculoskeletal: No muscular pain	  Neurological: No HA                          11.6   12.38 )-----------( 253      ( 07 Feb 2018 04:12 )             35.8                                                               02-07    139  |  100  |  8   ----------------------------<  209<H>  4.2   |  26  |  0.73    Ca    8.9      07 Feb 2018 04:12    TPro  6.9  /  Alb  3.8  /  TBili  0.6  /  DBili  x   /  AST  14  /  ALT  8   /  AlkPhos  77  02-07       Vital Signs Last 24 Hrs  T(C): 36.6 (07 Feb 2018 18:01), Max: 36.9 (06 Feb 2018 21:11)  T(F): 97.9 (07 Feb 2018 18:01), Max: 98.4 (06 Feb 2018 21:11)  HR: 62 (07 Feb 2018 18:01) (59 - 81)  BP: 165/52 (07 Feb 2018 18:01) (140/60 - 166/55)  BP(mean): --  RR: 16 (07 Feb 2018 18:01) (15 - 18)  SpO2: 99% (07 Feb 2018 18:01) (99% - 100%)    PHYSICAL EXAM:  Constitutional: Well nourished  Eyes: PERRLA, EOMI  Neck: supple, No JVD  Respiratory: CTABL   Cardiovascular: S1/S2, RRR  Gastrointestinal: Soft, NT, ND, + BS  Extremities: NO edema  Neurological: A+Ox3
77y            Female                                                                                                                                          PAST MEDICAL & SURGICAL HISTORY:  HTN  Dyslipidemia  Diabetes mellitus type II  S/P hysterectomy  S/P appendectomy        acetaminophen   Tablet. 650 milliGRAM(s) Oral every 6 hours PRN  amLODIPine   Tablet 10 milliGRAM(s) Oral daily  aspirin enteric coated 81 milliGRAM(s) Oral daily  atorvastatin 40 milliGRAM(s) Oral at bedtime  dextrose 5%. 1000 milliLiter(s) IV Continuous <Continuous>  dextrose 50% Injectable 12.5 Gram(s) IV Push once  dextrose 50% Injectable 25 Gram(s) IV Push once  dextrose 50% Injectable 25 Gram(s) IV Push once  dextrose Gel 1 Dose(s) Oral once PRN  enoxaparin Injectable 40 milliGRAM(s) SubCutaneous every 24 hours  glucagon  Injectable 1 milliGRAM(s) IntraMuscular once PRN  insulin lispro (HumaLOG) corrective regimen sliding scale   SubCutaneous three times a day before meals  insulin lispro (HumaLOG) corrective regimen sliding scale   SubCutaneous at bedtime  lisinopril 20 milliGRAM(s) Oral daily      REVIEW OF SYSTEMS  General: InNAD  Respiratory and Thorax: No SOB  Cardiovascular: No CP, No palpitations  Gastrointestinal: NON/V/D  Musculoskeletal: No muscular pain	  Neurological: No HA                          11.7   9.25  )-----------( 275      ( 08 Feb 2018 06:06 )             38.0                                                               02-08    139  |  101  |  13  ----------------------------<  159<H>  3.9   |  25  |  0.91    Ca    9.4      08 Feb 2018 06:06  Mg     1.8     02-08    TPro  6.9  /  Alb  3.8  /  TBili  0.6  /  DBili  x   /  AST  14  /  ALT  8   /  AlkPhos  77  02-07       Vital Signs Last 24 Hrs  T(C): 36.6 (08 Feb 2018 06:45), Max: 36.6 (07 Feb 2018 18:01)  T(F): 97.8 (08 Feb 2018 06:45), Max: 97.9 (07 Feb 2018 18:01)  HR: 63 (08 Feb 2018 06:45) (51 - 90)  BP: 138/67 (08 Feb 2018 06:45) (138/67 - 165/52)  BP(mean): --  RR: 18 (08 Feb 2018 06:45) (16 - 18)  SpO2: 98% (08 Feb 2018 06:45) (98% - 99%)    PHYSICAL EXAM:  Constitutional: Well nourished  Eyes: PERRLA, EOMI  Neck: supple, No JVD  Respiratory: CTABL   Cardiovascular: S1/S2, RRR  Gastrointestinal: Soft, NT, ND, + BS  Extremities: NO edema  Neurological: A+Ox3
Subjective: Patient seen and examined. No new events except as noted.     SUBJECTIVE/ROS:  No chest pain, dyspnea, palpitation, or dizziness.       MEDICATIONS:  MEDICATIONS  (STANDING):  amLODIPine   Tablet 10 milliGRAM(s) Oral daily  aspirin enteric coated 81 milliGRAM(s) Oral daily  atorvastatin 40 milliGRAM(s) Oral at bedtime  dextrose 5%. 1000 milliLiter(s) (50 mL/Hr) IV Continuous <Continuous>  dextrose 50% Injectable 12.5 Gram(s) IV Push once  dextrose 50% Injectable 25 Gram(s) IV Push once  dextrose 50% Injectable 25 Gram(s) IV Push once  enoxaparin Injectable 40 milliGRAM(s) SubCutaneous every 24 hours  insulin lispro (HumaLOG) corrective regimen sliding scale   SubCutaneous three times a day before meals  insulin lispro (HumaLOG) corrective regimen sliding scale   SubCutaneous at bedtime  lisinopril 20 milliGRAM(s) Oral daily      PHYSICAL EXAM:  T(C): 36.7 (02-09-18 @ 05:59), Max: 37 (02-08-18 @ 21:21)  HR: 60 (02-09-18 @ 07:54) (53 - 73)  BP: 122/60 (02-09-18 @ 05:59) (122/60 - 147/52)  RR: 16 (02-09-18 @ 05:59) (16 - 18)  SpO2: 98% (02-09-18 @ 07:54) (95% - 100%)  Wt(kg): --  I&O's Summary    Height (cm): 165.1 (02-08 @ 18:20)  Weight (kg): 73.9 (02-08 @ 18:20)  BMI (kg/m2): 27.1 (02-08 @ 18:20)  BSA (m2): 1.81 (02-08 @ 18:20)    Appearance: Normal	  HEENT:   Normal oral mucosa, PERRL, EOMI	  Cardiovascular: Normal S1 S2,    Murmur:   Neck: JVP normal  Respiratory: Lungs clear to auscultation  Gastrointestinal:  Soft, Non-tender, + BS	  Skin: normal   Neuro: No gross deficits.   Psychiatry:  Mood & affect appropriate  Ext: No edema      LABS/DATA:    CARDIAC MARKERS:  CARDIAC MARKERS ( 07 Feb 2018 04:12 )  x     / < 0.06 ng/mL / 99 u/L / 1.20 ng/mL / x      CARDIAC MARKERS ( 06 Feb 2018 21:57 )  x     / < 0.06 ng/mL / 113 u/L / 1.23 ng/mL / x                                    12.8   7.38  )-----------( 268      ( 09 Feb 2018 07:47 )             39.7     02-09    139  |  102  |  17  ----------------------------<  131<H>  4.0   |  27  |  0.89    Ca    9.1      09 Feb 2018 07:47  Mg     1.8     02-09      proBNP:   Lipid Profile:   HgA1c:   TSH:     TELE:  EKG:
Subjective: Patient seen and examined. No new events except as noted.     SUBJECTIVE/ROS:  resting comfortably       MEDICATIONS:  MEDICATIONS  (STANDING):  amLODIPine   Tablet 10 milliGRAM(s) Oral daily  aspirin enteric coated 81 milliGRAM(s) Oral daily  atorvastatin 40 milliGRAM(s) Oral at bedtime  dextrose 5%. 1000 milliLiter(s) (50 mL/Hr) IV Continuous <Continuous>  dextrose 50% Injectable 12.5 Gram(s) IV Push once  dextrose 50% Injectable 25 Gram(s) IV Push once  dextrose 50% Injectable 25 Gram(s) IV Push once  enoxaparin Injectable 40 milliGRAM(s) SubCutaneous every 24 hours  insulin lispro (HumaLOG) corrective regimen sliding scale   SubCutaneous three times a day before meals  insulin lispro (HumaLOG) corrective regimen sliding scale   SubCutaneous at bedtime  lisinopril 20 milliGRAM(s) Oral daily      PHYSICAL EXAM:  T(C): 36.6 (02-08-18 @ 06:45), Max: 36.6 (02-07-18 @ 18:01)  HR: 63 (02-08-18 @ 06:45) (51 - 90)  BP: 138/67 (02-08-18 @ 06:45) (138/67 - 165/52)  RR: 18 (02-08-18 @ 06:45) (16 - 18)  SpO2: 98% (02-08-18 @ 06:45) (98% - 100%)  Wt(kg): --  I&O's Summary    JVP: Normal  Neck: supple  Lung: clear   CV: S1 S2 , Murmur:  Abd: soft  Ext: No edema  neuro: Awake / alert  Psych: flat affect  Skin: normal       LABS/DATA:    CARDIAC MARKERS:  CARDIAC MARKERS ( 07 Feb 2018 04:12 )  x     / < 0.06 ng/mL / 99 u/L / 1.20 ng/mL / x      CARDIAC MARKERS ( 06 Feb 2018 21:57 )  x     / < 0.06 ng/mL / 113 u/L / 1.23 ng/mL / x                                    11.7   9.25  )-----------( 275      ( 08 Feb 2018 06:06 )             38.0     02-08    139  |  101  |  13  ----------------------------<  159<H>  3.9   |  25  |  0.91    Ca    9.4      08 Feb 2018 06:06  Mg     1.8     02-08    TPro  6.9  /  Alb  3.8  /  TBili  0.6  /  DBili  x   /  AST  14  /  ALT  8   /  AlkPhos  77  02-07    proBNP:   Lipid Profile:   HgA1c:   TSH: Thyroid Stimulating Hormone, Serum: 0.81 uIU/mL (02-08 @ 06:06)      TELE: sinus rhythm sinus katy   EKG:
77y            Female                                                                                                                                          PAST MEDICAL & SURGICAL HISTORY:  HTN  Dyslipidemia  Diabetes mellitus type II  S/P hysterectomy  S/P appendectomy        acetaminophen   Tablet. 650 milliGRAM(s) Oral every 6 hours PRN  amLODIPine   Tablet 10 milliGRAM(s) Oral daily  aspirin enteric coated 81 milliGRAM(s) Oral daily  atorvastatin 40 milliGRAM(s) Oral at bedtime  dextrose 5%. 1000 milliLiter(s) IV Continuous <Continuous>  dextrose 50% Injectable 12.5 Gram(s) IV Push once  dextrose 50% Injectable 25 Gram(s) IV Push once  dextrose 50% Injectable 25 Gram(s) IV Push once  dextrose Gel 1 Dose(s) Oral once PRN  enoxaparin Injectable 40 milliGRAM(s) SubCutaneous every 24 hours  glucagon  Injectable 1 milliGRAM(s) IntraMuscular once PRN  insulin lispro (HumaLOG) corrective regimen sliding scale   SubCutaneous three times a day before meals  insulin lispro (HumaLOG) corrective regimen sliding scale   SubCutaneous at bedtime  lisinopril 20 milliGRAM(s) Oral daily      REVIEW OF SYSTEMS  General: InNAD  Respiratory and Thorax: No SOB  Cardiovascular: No CP, No palpitations  Gastrointestinal: NON/V/D  Musculoskeletal: No muscular pain	  Neurological: No HA                          12.8   7.38  )-----------( 268      ( 09 Feb 2018 07:47 )             39.7                                                               02-09    139  |  102  |  17  ----------------------------<  131<H>  4.0   |  27  |  0.89    Ca    9.1      09 Feb 2018 07:47  Mg     1.8     02-09         Vital Signs Last 24 Hrs  T(C): 36.7 (09 Feb 2018 05:59), Max: 37 (08 Feb 2018 21:21)  T(F): 98 (09 Feb 2018 05:59), Max: 98.6 (08 Feb 2018 21:21)  HR: 60 (09 Feb 2018 07:54) (53 - 73)  BP: 122/60 (09 Feb 2018 05:59) (122/60 - 147/52)  BP(mean): --  RR: 16 (09 Feb 2018 05:59) (16 - 18)  SpO2: 98% (09 Feb 2018 07:54) (95% - 100%)    PHYSICAL EXAM:  Constitutional: Well nourished  Eyes: PERRLA, EOMI  Neck: supple, No JVD  Respiratory: CTABL   Cardiovascular: S1/S2, RRR  Gastrointestinal: Soft, NT, ND, + BS  Extremities: NO edema  Neurological: A+Ox3

## 2018-02-09 NOTE — DISCHARGE NOTE ADULT - CARE PROVIDER_API CALL
Joshua Wang (DO), [facility]  [address] [place] [address]  Phone: [phone]  Fax: [phone]

## 2018-02-09 NOTE — DISCHARGE NOTE ADULT - PATIENT PORTAL LINK FT
You can access the [facility] Patient Portal, offered by [facility], by registering with the following website: [email or web address]

## 2018-02-16 DIAGNOSIS — R69 ILLNESS, UNSPECIFIED: ICD-10-CM

## 2018-05-01 PROCEDURE — G9001: CPT

## 2018-05-07 ENCOUNTER — INPATIENT (INPATIENT)
Facility: HOSPITAL | Age: 78
LOS: 3 days | Discharge: HOME HEALTH | End: 2018-05-11
Attending: HOSPITALIST | Admitting: HOSPITALIST
Payer: MEDICARE

## 2018-05-07 VITALS
DIASTOLIC BLOOD PRESSURE: 57 MMHG | SYSTOLIC BLOOD PRESSURE: 118 MMHG | OXYGEN SATURATION: 97 % | TEMPERATURE: 100 F | HEART RATE: 97 BPM | RESPIRATION RATE: 16 BRPM

## 2018-05-07 DIAGNOSIS — Z90.710 ACQUIRED ABSENCE OF BOTH CERVIX AND UTERUS: Chronic | ICD-10-CM

## 2018-05-07 DIAGNOSIS — Z90.49 ACQUIRED ABSENCE OF OTHER SPECIFIED PARTS OF DIGESTIVE TRACT: Chronic | ICD-10-CM

## 2018-05-07 NOTE — ED ADULT TRIAGE NOTE - CHIEF COMPLAINT QUOTE
pt brought to ED by daughter stating pt AMS x 4 days, pt stating cannot hear or speak, decreased PO intake x 1 week, has not been taking depression medication, on presentation when pt asked questions pt shakes head yes or no, denies pain when asked, appears in NAD @ this time

## 2018-05-08 DIAGNOSIS — E11.9 TYPE 2 DIABETES MELLITUS WITHOUT COMPLICATIONS: ICD-10-CM

## 2018-05-08 DIAGNOSIS — R05 COUGH: ICD-10-CM

## 2018-05-08 DIAGNOSIS — I10 ESSENTIAL (PRIMARY) HYPERTENSION: ICD-10-CM

## 2018-05-08 DIAGNOSIS — Z29.9 ENCOUNTER FOR PROPHYLACTIC MEASURES, UNSPECIFIED: ICD-10-CM

## 2018-05-08 DIAGNOSIS — G47.33 OBSTRUCTIVE SLEEP APNEA (ADULT) (PEDIATRIC): ICD-10-CM

## 2018-05-08 DIAGNOSIS — R62.7 ADULT FAILURE TO THRIVE: ICD-10-CM

## 2018-05-08 DIAGNOSIS — F32.9 MAJOR DEPRESSIVE DISORDER, SINGLE EPISODE, UNSPECIFIED: ICD-10-CM

## 2018-05-08 LAB
ALBUMIN SERPL ELPH-MCNC: 3.4 G/DL — SIGNIFICANT CHANGE UP (ref 3.3–5)
ALP SERPL-CCNC: 116 U/L — SIGNIFICANT CHANGE UP (ref 40–120)
ALT FLD-CCNC: 12 U/L — SIGNIFICANT CHANGE UP (ref 4–33)
ANISOCYTOSIS BLD QL: SLIGHT — SIGNIFICANT CHANGE UP
APPEARANCE UR: SIGNIFICANT CHANGE UP
AST SERPL-CCNC: 12 U/L — SIGNIFICANT CHANGE UP (ref 4–32)
B PERT DNA SPEC QL NAA+PROBE: SIGNIFICANT CHANGE UP
BASOPHILS # BLD AUTO: 0.05 K/UL — SIGNIFICANT CHANGE UP (ref 0–0.2)
BASOPHILS NFR BLD AUTO: 0.3 % — SIGNIFICANT CHANGE UP (ref 0–2)
BASOPHILS NFR SPEC: 0 % — SIGNIFICANT CHANGE UP (ref 0–2)
BILIRUB SERPL-MCNC: 0.7 MG/DL — SIGNIFICANT CHANGE UP (ref 0.2–1.2)
BILIRUB UR-MCNC: NEGATIVE — SIGNIFICANT CHANGE UP
BLOOD UR QL VISUAL: NEGATIVE — SIGNIFICANT CHANGE UP
BUN SERPL-MCNC: 18 MG/DL — SIGNIFICANT CHANGE UP (ref 7–23)
C PNEUM DNA SPEC QL NAA+PROBE: NOT DETECTED — SIGNIFICANT CHANGE UP
CALCIUM SERPL-MCNC: 9.8 MG/DL — SIGNIFICANT CHANGE UP (ref 8.4–10.5)
CHLORIDE SERPL-SCNC: 92 MMOL/L — LOW (ref 98–107)
CO2 SERPL-SCNC: 25 MMOL/L — SIGNIFICANT CHANGE UP (ref 22–31)
COLOR SPEC: YELLOW — SIGNIFICANT CHANGE UP
CREAT SERPL-MCNC: 0.93 MG/DL — SIGNIFICANT CHANGE UP (ref 0.5–1.3)
EOSINOPHIL # BLD AUTO: 0.03 K/UL — SIGNIFICANT CHANGE UP (ref 0–0.5)
EOSINOPHIL NFR BLD AUTO: 0.2 % — SIGNIFICANT CHANGE UP (ref 0–6)
EOSINOPHIL NFR FLD: 0 % — SIGNIFICANT CHANGE UP (ref 0–6)
FLUAV H1 2009 PAND RNA SPEC QL NAA+PROBE: NOT DETECTED — SIGNIFICANT CHANGE UP
FLUAV H1 RNA SPEC QL NAA+PROBE: NOT DETECTED — SIGNIFICANT CHANGE UP
FLUAV H3 RNA SPEC QL NAA+PROBE: NOT DETECTED — SIGNIFICANT CHANGE UP
FLUAV SUBTYP SPEC NAA+PROBE: SIGNIFICANT CHANGE UP
FLUBV RNA SPEC QL NAA+PROBE: NOT DETECTED — SIGNIFICANT CHANGE UP
FOLATE SERPL-MCNC: 17.6 NG/ML — SIGNIFICANT CHANGE UP (ref 4.7–20)
GIANT PLATELETS BLD QL SMEAR: PRESENT — SIGNIFICANT CHANGE UP
GLUCOSE SERPL-MCNC: 254 MG/DL — HIGH (ref 70–99)
GLUCOSE UR-MCNC: 100 — SIGNIFICANT CHANGE UP
HADV DNA SPEC QL NAA+PROBE: NOT DETECTED — SIGNIFICANT CHANGE UP
HCOV 229E RNA SPEC QL NAA+PROBE: NOT DETECTED — SIGNIFICANT CHANGE UP
HCOV HKU1 RNA SPEC QL NAA+PROBE: NOT DETECTED — SIGNIFICANT CHANGE UP
HCOV NL63 RNA SPEC QL NAA+PROBE: NOT DETECTED — SIGNIFICANT CHANGE UP
HCOV OC43 RNA SPEC QL NAA+PROBE: NOT DETECTED — SIGNIFICANT CHANGE UP
HCT VFR BLD CALC: 39.3 % — SIGNIFICANT CHANGE UP (ref 34.5–45)
HGB BLD-MCNC: 12.5 G/DL — SIGNIFICANT CHANGE UP (ref 11.5–15.5)
HMPV RNA SPEC QL NAA+PROBE: NOT DETECTED — SIGNIFICANT CHANGE UP
HPIV1 RNA SPEC QL NAA+PROBE: NOT DETECTED — SIGNIFICANT CHANGE UP
HPIV2 RNA SPEC QL NAA+PROBE: NOT DETECTED — SIGNIFICANT CHANGE UP
HPIV3 RNA SPEC QL NAA+PROBE: NOT DETECTED — SIGNIFICANT CHANGE UP
HPIV4 RNA SPEC QL NAA+PROBE: NOT DETECTED — SIGNIFICANT CHANGE UP
HYALINE CASTS # UR AUTO: SIGNIFICANT CHANGE UP (ref 0–?)
IMM GRANULOCYTES # BLD AUTO: 0.37 # — SIGNIFICANT CHANGE UP
IMM GRANULOCYTES NFR BLD AUTO: 1.9 % — HIGH (ref 0–1.5)
KETONES UR-MCNC: NEGATIVE — SIGNIFICANT CHANGE UP
LEUKOCYTE ESTERASE UR-ACNC: NEGATIVE — SIGNIFICANT CHANGE UP
LYMPHOCYTES # BLD AUTO: 19.2 % — SIGNIFICANT CHANGE UP (ref 13–44)
LYMPHOCYTES # BLD AUTO: 3.83 K/UL — HIGH (ref 1–3.3)
LYMPHOCYTES NFR SPEC AUTO: 14.7 % — SIGNIFICANT CHANGE UP (ref 13–44)
M PNEUMO DNA SPEC QL NAA+PROBE: NOT DETECTED — SIGNIFICANT CHANGE UP
MCHC RBC-ENTMCNC: 25.2 PG — LOW (ref 27–34)
MCHC RBC-ENTMCNC: 31.8 % — LOW (ref 32–36)
MCV RBC AUTO: 79.2 FL — LOW (ref 80–100)
MICROCYTES BLD QL: SLIGHT — SIGNIFICANT CHANGE UP
MONOCYTES # BLD AUTO: 2.29 K/UL — HIGH (ref 0–0.9)
MONOCYTES NFR BLD AUTO: 11.5 % — SIGNIFICANT CHANGE UP (ref 2–14)
MONOCYTES NFR BLD: 12.8 % — HIGH (ref 2–9)
MUCOUS THREADS # UR AUTO: SIGNIFICANT CHANGE UP
NEUTROPHIL AB SER-ACNC: 66.1 % — SIGNIFICANT CHANGE UP (ref 43–77)
NEUTROPHILS # BLD AUTO: 13.33 K/UL — HIGH (ref 1.8–7.4)
NEUTROPHILS NFR BLD AUTO: 66.9 % — SIGNIFICANT CHANGE UP (ref 43–77)
NEUTS BAND # BLD: 2.7 % — SIGNIFICANT CHANGE UP (ref 0–6)
NITRITE UR-MCNC: NEGATIVE — SIGNIFICANT CHANGE UP
NRBC # FLD: 0 — SIGNIFICANT CHANGE UP
OTHER - HEMATOLOGY %: 0.9 — SIGNIFICANT CHANGE UP
OVALOCYTES BLD QL SMEAR: SLIGHT — SIGNIFICANT CHANGE UP
PH UR: 6.5 — SIGNIFICANT CHANGE UP (ref 4.6–8)
PLATELET # BLD AUTO: 444 K/UL — HIGH (ref 150–400)
PLATELET COUNT - ESTIMATE: NORMAL — SIGNIFICANT CHANGE UP
PMV BLD: 10.9 FL — SIGNIFICANT CHANGE UP (ref 7–13)
POIKILOCYTOSIS BLD QL AUTO: SLIGHT — SIGNIFICANT CHANGE UP
POTASSIUM SERPL-MCNC: 4 MMOL/L — SIGNIFICANT CHANGE UP (ref 3.5–5.3)
POTASSIUM SERPL-SCNC: 4 MMOL/L — SIGNIFICANT CHANGE UP (ref 3.5–5.3)
PROT SERPL-MCNC: 8.1 G/DL — SIGNIFICANT CHANGE UP (ref 6–8.3)
PROT UR-MCNC: 30 MG/DL — HIGH
RBC # BLD: 4.96 M/UL — SIGNIFICANT CHANGE UP (ref 3.8–5.2)
RBC # FLD: 12.9 % — SIGNIFICANT CHANGE UP (ref 10.3–14.5)
RBC CASTS # UR COMP ASSIST: SIGNIFICANT CHANGE UP (ref 0–?)
RSV RNA SPEC QL NAA+PROBE: NOT DETECTED — SIGNIFICANT CHANGE UP
RV+EV RNA SPEC QL NAA+PROBE: NOT DETECTED — SIGNIFICANT CHANGE UP
SMUDGE CELLS # BLD: PRESENT — SIGNIFICANT CHANGE UP
SODIUM SERPL-SCNC: 132 MMOL/L — LOW (ref 135–145)
SP GR SPEC: 1.02 — SIGNIFICANT CHANGE UP (ref 1–1.04)
SQUAMOUS # UR AUTO: SIGNIFICANT CHANGE UP
TSH SERPL-MCNC: 0.62 UIU/ML — SIGNIFICANT CHANGE UP (ref 0.27–4.2)
UROBILINOGEN FLD QL: NORMAL MG/DL — SIGNIFICANT CHANGE UP
VARIANT LYMPHS # BLD: 2.8 % — SIGNIFICANT CHANGE UP
VIT B12 SERPL-MCNC: 806 PG/ML — SIGNIFICANT CHANGE UP (ref 200–900)
WBC # BLD: 19.9 K/UL — HIGH (ref 3.8–10.5)
WBC # FLD AUTO: 19.9 K/UL — HIGH (ref 3.8–10.5)
WBC UR QL: SIGNIFICANT CHANGE UP (ref 0–?)

## 2018-05-08 PROCEDURE — 71046 X-RAY EXAM CHEST 2 VIEWS: CPT | Mod: 26

## 2018-05-08 PROCEDURE — 74177 CT ABD & PELVIS W/CONTRAST: CPT | Mod: 26

## 2018-05-08 PROCEDURE — 99233 SBSQ HOSP IP/OBS HIGH 50: CPT | Mod: GC

## 2018-05-08 RX ORDER — ATORVASTATIN CALCIUM 80 MG/1
40 TABLET, FILM COATED ORAL AT BEDTIME
Qty: 0 | Refills: 0 | Status: DISCONTINUED | OUTPATIENT
Start: 2018-05-08 | End: 2018-05-11

## 2018-05-08 RX ORDER — INSULIN LISPRO 100/ML
VIAL (ML) SUBCUTANEOUS AT BEDTIME
Qty: 0 | Refills: 0 | Status: DISCONTINUED | OUTPATIENT
Start: 2018-05-08 | End: 2018-05-11

## 2018-05-08 RX ORDER — HEPARIN SODIUM 5000 [USP'U]/ML
5000 INJECTION INTRAVENOUS; SUBCUTANEOUS EVERY 8 HOURS
Qty: 0 | Refills: 0 | Status: DISCONTINUED | OUTPATIENT
Start: 2018-05-08 | End: 2018-05-11

## 2018-05-08 RX ORDER — AZITHROMYCIN 500 MG/1
500 TABLET, FILM COATED ORAL EVERY 24 HOURS
Qty: 0 | Refills: 0 | Status: DISCONTINUED | OUTPATIENT
Start: 2018-05-08 | End: 2018-05-08

## 2018-05-08 RX ORDER — BENZOCAINE AND MENTHOL 5; 1 G/100ML; G/100ML
1 LIQUID ORAL
Qty: 0 | Refills: 0 | Status: DISCONTINUED | OUTPATIENT
Start: 2018-05-08 | End: 2018-05-11

## 2018-05-08 RX ORDER — GLUCAGON INJECTION, SOLUTION 0.5 MG/.1ML
1 INJECTION, SOLUTION SUBCUTANEOUS ONCE
Qty: 0 | Refills: 0 | Status: DISCONTINUED | OUTPATIENT
Start: 2018-05-08 | End: 2018-05-11

## 2018-05-08 RX ORDER — AMLODIPINE BESYLATE 2.5 MG/1
10 TABLET ORAL DAILY
Qty: 0 | Refills: 0 | Status: DISCONTINUED | OUTPATIENT
Start: 2018-05-08 | End: 2018-05-11

## 2018-05-08 RX ORDER — SODIUM CHLORIDE 9 MG/ML
1000 INJECTION, SOLUTION INTRAVENOUS
Qty: 0 | Refills: 0 | Status: DISCONTINUED | OUTPATIENT
Start: 2018-05-08 | End: 2018-05-11

## 2018-05-08 RX ORDER — INSULIN LISPRO 100/ML
VIAL (ML) SUBCUTANEOUS
Qty: 0 | Refills: 0 | Status: DISCONTINUED | OUTPATIENT
Start: 2018-05-08 | End: 2018-05-11

## 2018-05-08 RX ORDER — SERTRALINE 25 MG/1
50 TABLET, FILM COATED ORAL DAILY
Qty: 0 | Refills: 0 | Status: DISCONTINUED | OUTPATIENT
Start: 2018-05-08 | End: 2018-05-11

## 2018-05-08 RX ORDER — IPRATROPIUM/ALBUTEROL SULFATE 18-103MCG
3 AEROSOL WITH ADAPTER (GRAM) INHALATION EVERY 6 HOURS
Qty: 0 | Refills: 0 | Status: DISCONTINUED | OUTPATIENT
Start: 2018-05-08 | End: 2018-05-11

## 2018-05-08 RX ORDER — DEXTROSE 50 % IN WATER 50 %
25 SYRINGE (ML) INTRAVENOUS ONCE
Qty: 0 | Refills: 0 | Status: DISCONTINUED | OUTPATIENT
Start: 2018-05-08 | End: 2018-05-11

## 2018-05-08 RX ORDER — DEXTROSE 50 % IN WATER 50 %
12.5 SYRINGE (ML) INTRAVENOUS ONCE
Qty: 0 | Refills: 0 | Status: DISCONTINUED | OUTPATIENT
Start: 2018-05-08 | End: 2018-05-11

## 2018-05-08 RX ORDER — DEXTROSE 50 % IN WATER 50 %
15 SYRINGE (ML) INTRAVENOUS ONCE
Qty: 0 | Refills: 0 | Status: DISCONTINUED | OUTPATIENT
Start: 2018-05-08 | End: 2018-05-11

## 2018-05-08 RX ORDER — ASPIRIN/CALCIUM CARB/MAGNESIUM 324 MG
81 TABLET ORAL DAILY
Qty: 0 | Refills: 0 | Status: DISCONTINUED | OUTPATIENT
Start: 2018-05-08 | End: 2018-05-11

## 2018-05-08 RX ORDER — CEFTRIAXONE 500 MG/1
1 INJECTION, POWDER, FOR SOLUTION INTRAMUSCULAR; INTRAVENOUS ONCE
Qty: 0 | Refills: 0 | Status: DISCONTINUED | OUTPATIENT
Start: 2018-05-08 | End: 2018-05-08

## 2018-05-08 RX ORDER — SODIUM CHLORIDE 9 MG/ML
1000 INJECTION, SOLUTION INTRAVENOUS
Qty: 0 | Refills: 0 | Status: DISCONTINUED | OUTPATIENT
Start: 2018-05-08 | End: 2018-05-08

## 2018-05-08 RX ORDER — CEFTRIAXONE 500 MG/1
INJECTION, POWDER, FOR SOLUTION INTRAMUSCULAR; INTRAVENOUS
Qty: 0 | Refills: 0 | Status: DISCONTINUED | OUTPATIENT
Start: 2018-05-08 | End: 2018-05-08

## 2018-05-08 RX ORDER — LISINOPRIL 2.5 MG/1
20 TABLET ORAL DAILY
Qty: 0 | Refills: 0 | Status: DISCONTINUED | OUTPATIENT
Start: 2018-05-08 | End: 2018-05-11

## 2018-05-08 RX ADMIN — BENZOCAINE AND MENTHOL 1 LOZENGE: 5; 1 LIQUID ORAL at 22:10

## 2018-05-08 RX ADMIN — ATORVASTATIN CALCIUM 40 MILLIGRAM(S): 80 TABLET, FILM COATED ORAL at 22:08

## 2018-05-08 RX ADMIN — AMLODIPINE BESYLATE 10 MILLIGRAM(S): 2.5 TABLET ORAL at 22:08

## 2018-05-08 RX ADMIN — Medication 3 MILLILITER(S): at 14:49

## 2018-05-08 RX ADMIN — HEPARIN SODIUM 5000 UNIT(S): 5000 INJECTION INTRAVENOUS; SUBCUTANEOUS at 22:08

## 2018-05-08 RX ADMIN — SODIUM CHLORIDE 100 MILLILITER(S): 9 INJECTION, SOLUTION INTRAVENOUS at 09:37

## 2018-05-08 RX ADMIN — Medication 3 MILLILITER(S): at 23:01

## 2018-05-08 RX ADMIN — LISINOPRIL 20 MILLIGRAM(S): 2.5 TABLET ORAL at 22:08

## 2018-05-08 RX ADMIN — SODIUM CHLORIDE 100 MILLILITER(S): 9 INJECTION, SOLUTION INTRAVENOUS at 12:20

## 2018-05-08 RX ADMIN — SERTRALINE 50 MILLIGRAM(S): 25 TABLET, FILM COATED ORAL at 14:08

## 2018-05-08 RX ADMIN — HEPARIN SODIUM 5000 UNIT(S): 5000 INJECTION INTRAVENOUS; SUBCUTANEOUS at 14:08

## 2018-05-08 RX ADMIN — SODIUM CHLORIDE 100 MILLILITER(S): 9 INJECTION, SOLUTION INTRAVENOUS at 14:15

## 2018-05-08 RX ADMIN — Medication 81 MILLIGRAM(S): at 14:08

## 2018-05-08 RX ADMIN — Medication 2: at 17:50

## 2018-05-08 NOTE — H&P ADULT - HISTORY OF PRESENT ILLNESS
77F with PMHx of DM, HTN, HLD, depression adn B12 deficiency  is brought in by her daughter for altered mental status.    Daughter reports patient is unable to take care of herself during the day, stays in bed all day, does not eat, unwilling to walk self to bathroom, not bathing, not taking meds, reduced PO, all progressing over x3 months. Also notes persisting cough, frequent spitting up, difficutly swallowing, no prior hx of cva, no recent falls. Per pt, complaints of mild dysuria and lower abdominal pain, pt admits to not taking PO and not taking medications and to staying in bed all day, decreased energy. Denies n/v/f/c/cp/sob. Denies headache, syncope, lightheadedness, dizziness. Denies hematuria, hematochezia, BRBPR, tarry stools. 77F with PMHx of DM, HTN, HLD, EDUAR, depression and B12 deficiency  is brought in by her daughter for lack of self care.    Daughter reports that patient is unable to take of herself, stays in bed all day and is unwilling to eat. She also reports patient has not been taking any over medications either. Daughter states patient has been like this and worsening over the past few months. In addition, daughter reports patient has been coughing with sputum production, and states that everyone in the household is having a "head cold." Daughter states it is too much to take care of her like this.    Patient reports that daughter brought her in because she has been feeling tired and has had difficulty with swallowing. She states this has been worsening over the past few weeks. Patient states she has also lost her appetite and has difficulty swallowing, which has led to her not taking her medications because "she can't take her meds on an empty stomach." Pt also states she has some pain in her belly, along with nauseua, and when she tries to eat solid foods, she vomits. Daughter does not endorse any vomiting. Patient also reports that she has been having a bad cough for the past few days with marked amount of sputum production.    Patient denies any fevers, chills, headaches, chest pain or shortness of breath, dysuria or hematuria, or any changes in her bowel habits, 77F with PMHx of DM, HTN, HLD, EDUAR, depression and B12 deficiency  is brought in by her daughter for lack of self care.    Daughter reports that patient is unable to take of herself, stays in bed all day and is unwilling to eat. She also reports patient has not been taking any over medications either. Daughter states patient has been like this and worsening over the past few months. In addition, daughter reports patient has been coughing with sputum production, and states that everyone in the household is having a "head cold." Daughter states it is too much to take care of her like this.    Patient reports that daughter brought her in because she has been feeling tired and has had difficulty with swallowing. She states this has been worsening over the past few weeks. Patient states she has also lost her appetite and has difficulty swallowing, which has led to her not taking her medications because "she can't take her meds on an empty stomach." Pt also states she has some pain in her belly, along with nauseua, and when she tries to eat solid foods, she vomits. Daughter does not endorse any vomiting. Patient also reports that she has been having a bad cough for the past few days with marked amount of sputum production.     Of note, patient also endorses some lightheadedness when she gets out of bed. She was admitted twice with episodes of syncope (last admission in February), thought to be secondary to vasovagal syncope.    Patient denies any fevers, chills, headaches, chest pain or shortness of breath, dysuria or hematuria, or any changes in her bowel habits,

## 2018-05-08 NOTE — ED PROVIDER NOTE - MEDICAL DECISION MAKING DETAILS
77F hx of DM HTN HLD depression b12 def presents with daughter with a cc of AMS, per daughter reports mother unable to take care of herself during the day, stays in bed all day, does not eat, unwilling to walk self to bathroom, not bathing, not taking meds, reduced PO, all progressing over x3 months. exam vss mild umbilical ttp no e/o fnd, exam otherwise unremarkable, c/f failure to thrive, however will get cbc cmp tsh b12/folate urine ctap likely admit for failure to thrive

## 2018-05-08 NOTE — H&P ADULT - PROBLEM SELECTOR PLAN 3
- currently patient denies suicidal or homicidal ideation  - PHQ2 negative  - continue with home dose of sertraline

## 2018-05-08 NOTE — ED ADULT NURSE NOTE - OBJECTIVE STATEMENT
Received pt to room 23. pt is alert and oriented x2. ambulatory, feels weak. c/o " not able to hear for past 3 days." also c/o cold symptoms. as per daughter pt is depressed and is not eating well . stays in bed almost all day. labs sent. MD evaluation in progress. 20 G placed on left AC>

## 2018-05-08 NOTE — H&P ADULT - NSHPREVIEWOFSYSTEMS_GEN_ALL_CORE
REVIEW OF SYSTEMS:    CONSTITUTIONAL: + weakness, no fevers or chills  EYES/ENT: No visual changes;  No vertigo or throat pain   NECK: No pain or stiffness  RESPIRATORY: + cough, no  wheezing, hemoptysis; No shortness of breath  CARDIOVASCULAR: No chest pain or palpitations  GASTROINTESTINAL: +abdominal and epigastric pain; +nausea and vomiting, or hematemesis; No diarrhea or constipation. No melena or hematochezia.  GENITOURINARY: No dysuria, frequency or hematuria  NEUROLOGICAL: No numbness or weakness  SKIN: No itching, rashes

## 2018-05-08 NOTE — H&P ADULT - ASSESSMENT
77F with PMHx of DM, HTN, HLD, EDUAR, depression and B12 deficiency  is brought in by her daughter for lack of self care, complaining of fatigue and cough, admitted for likely viral URI.

## 2018-05-08 NOTE — ED PROVIDER NOTE - OBJECTIVE STATEMENT
77F hx of DM HTN HLD depression b12 def presents with daughter with a cc of AMS, per daughter reports mother unable to take care of herself during the day, stays in bed all day, does not eat, unwilling to walk self to bathroom, not bathing, not taking meds, reduced PO, all progressing over x3 months. Also notes persisting cough, frequent spitting up, difficutly swallowing, no prior hx of cva, no recent falls. Per pt, complaints of mild dysuria and lower abdominal pain, pt admits to not taking PO and not taking medications and to staying in bed all day, decreased energy. Denies n/v/f/c/cp/sob. Denies headache, syncope, lightheadedness, dizziness. Denies hematuria, hematochezia, BRBPR, tarry stools.

## 2018-05-08 NOTE — H&P ADULT - PROBLEM SELECTOR PLAN 2
- outpatient sleep studies indicate EDUAR  - patients fatigue likely due to noncompliance with CPAP  - will ensure CPAP used inpatient

## 2018-05-08 NOTE — H&P ADULT - NSHPPHYSICALEXAM_GEN_ALL_CORE
General: well appearing female, NAD  Neurology: A&Ox3, non focal  Head:  Normocephalic, atraumatic  ENT:  Mucosa moist, no ulcerations  Neck:  Supple, no sinuses or palpable masses  Lymphatic:  No palpable cervical, supraclavicular, lymphadenopathy  Respiratory: left upper lobe expiratory wheezes, b/l lower lobe expiratory wheezing  CV: RRR, S1S2, no murmurs  Abdominal: Soft, tender to palpation   MSK: No edema, + peripheral pulses, FROM all 4 extremity  Incisions: intact, no erythema or drainage

## 2018-05-08 NOTE — ED PROVIDER NOTE - ATTENDING CONTRIBUTION TO CARE
DR. STREETER, ATTENDING MD-  I performed a face to face bedside interview with patient regarding history of present illness, review of symptoms and past medical history. I completed an independent physical exam.  I have discussed patient's plan of care with the resident.   Documentation as above in the note.   HPI: 77F hx of DM, HTN, HLD, depression, b12 def, previous hysterectomy, and diverticulitis presents with daughter with concerns for AMS, FTT, and periumbilical pain in abd. Pt is also having diff hearing recently with URI symptoms. Abd pain, sharp, mostly with pressure, non radiating, 6/10, no urinary symptoms, no fevers, chills, N/V/D. No chest pain, SOB. daughter reports that pt lives with other daughter and feels like pt cannot be taken care of by either as they both work full time and do not have aides to help. Otherwise, not eating due to not having good appetite. No dark stools.   EXAM: NAD, abd with well healed midline surgical scar, tenderness to palpation in periumbilical, no rebound, no guarding. Ears TM with effusion bilateral.    MDM: Concern for OM bilaterally due to finding on physical exam, abd worry for diverticulitis vs sbo vs GERD. Will obtain labs, imaging, and provide iVF and reassess but likely admit due to FTT in adult, need admission and placement vs SW for aide help at home.

## 2018-05-08 NOTE — H&P ADULT - NSHPLABSRESULTS_GEN_ALL_CORE
12.5   19.90 )-----------( 444      ( 08 May 2018 02:00 )             39.3       05-08    132<L>  |  92<L>  |  18  ----------------------------<  254<H>  4.0   |  25  |  0.93    Ca    9.8      08 May 2018 02:00    TPro  8.1  /  Alb  3.4  /  TBili  0.7  /  DBili  x   /  AST  12  /  ALT  12  /  AlkPhos  116  05-08                    Urinalysis Basic - ( 08 May 2018 02:00 )    Color: YELLOW / Appearance: HAZY / S.022 / pH: 6.5  Gluc: 100 / Ketone: NEGATIVE  / Bili: NEGATIVE / Urobili: NORMAL mg/dL   Blood: NEGATIVE / Protein: 30 mg/dL / Nitrite: NEGATIVE   Leuk Esterase: NEGATIVE / RBC: 0-2 / WBC 2-5   Sq Epi: OCC / Non Sq Epi: x / Bacteria: x        EKG: normal sinus, no ischemic changes  CXR: clear lungs

## 2018-05-08 NOTE — H&P ADULT - PROBLEM SELECTOR PLAN 1
- patient presenting with cough and sputum production, with multiple sick contacts  - leukocytosis with monocyte predominance  - CXR negative but exam significant for expiratory wheezes  - likely secondary to URI- follow up RVP  - c/w duonebs standing q6 at this time  - monitor off antibiotics  - HD stable, afebrile

## 2018-05-09 DIAGNOSIS — E11.65 TYPE 2 DIABETES MELLITUS WITH HYPERGLYCEMIA: ICD-10-CM

## 2018-05-09 DIAGNOSIS — R41.9 UNSPECIFIED SYMPTOMS AND SIGNS INVOLVING COGNITIVE FUNCTIONS AND AWARENESS: ICD-10-CM

## 2018-05-09 DIAGNOSIS — R45.3 DEMORALIZATION AND APATHY: ICD-10-CM

## 2018-05-09 DIAGNOSIS — I10 ESSENTIAL (PRIMARY) HYPERTENSION: ICD-10-CM

## 2018-05-09 DIAGNOSIS — E78.5 HYPERLIPIDEMIA, UNSPECIFIED: ICD-10-CM

## 2018-05-09 DIAGNOSIS — R41.82 ALTERED MENTAL STATUS, UNSPECIFIED: ICD-10-CM

## 2018-05-09 DIAGNOSIS — F32.9 MAJOR DEPRESSIVE DISORDER, SINGLE EPISODE, UNSPECIFIED: ICD-10-CM

## 2018-05-09 DIAGNOSIS — H91.90 UNSPECIFIED HEARING LOSS, UNSPECIFIED EAR: ICD-10-CM

## 2018-05-09 LAB
BASOPHILS # BLD AUTO: 0.05 K/UL — SIGNIFICANT CHANGE UP (ref 0–0.2)
BASOPHILS NFR BLD AUTO: 0.3 % — SIGNIFICANT CHANGE UP (ref 0–2)
BUN SERPL-MCNC: 9 MG/DL — SIGNIFICANT CHANGE UP (ref 7–23)
CALCIUM SERPL-MCNC: 9.4 MG/DL — SIGNIFICANT CHANGE UP (ref 8.4–10.5)
CHLORIDE SERPL-SCNC: 95 MMOL/L — LOW (ref 98–107)
CO2 SERPL-SCNC: 26 MMOL/L — SIGNIFICANT CHANGE UP (ref 22–31)
CREAT SERPL-MCNC: 0.74 MG/DL — SIGNIFICANT CHANGE UP (ref 0.5–1.3)
EOSINOPHIL # BLD AUTO: 0.07 K/UL — SIGNIFICANT CHANGE UP (ref 0–0.5)
EOSINOPHIL NFR BLD AUTO: 0.5 % — SIGNIFICANT CHANGE UP (ref 0–6)
GLUCOSE SERPL-MCNC: 161 MG/DL — HIGH (ref 70–99)
HBA1C BLD-MCNC: 11 % — HIGH (ref 4–5.6)
HCT VFR BLD CALC: 35 % — SIGNIFICANT CHANGE UP (ref 34.5–45)
HCT VFR BLD CALC: 35 % — SIGNIFICANT CHANGE UP (ref 34.5–45)
HGB BLD-MCNC: 11.1 G/DL — LOW (ref 11.5–15.5)
HGB BLD-MCNC: 11.1 G/DL — LOW (ref 11.5–15.5)
IMM GRANULOCYTES # BLD AUTO: 0.31 # — SIGNIFICANT CHANGE UP
IMM GRANULOCYTES NFR BLD AUTO: 2.1 % — HIGH (ref 0–1.5)
LYMPHOCYTES # BLD AUTO: 22.2 % — SIGNIFICANT CHANGE UP (ref 13–44)
LYMPHOCYTES # BLD AUTO: 3.23 K/UL — SIGNIFICANT CHANGE UP (ref 1–3.3)
MAGNESIUM SERPL-MCNC: 1.7 MG/DL — SIGNIFICANT CHANGE UP (ref 1.6–2.6)
MANUAL SMEAR VERIFICATION: SIGNIFICANT CHANGE UP
MANUAL SMEAR VERIFICATION: SIGNIFICANT CHANGE UP
MCHC RBC-ENTMCNC: 25 PG — LOW (ref 27–34)
MCHC RBC-ENTMCNC: 25 PG — LOW (ref 27–34)
MCHC RBC-ENTMCNC: 31.7 % — LOW (ref 32–36)
MCHC RBC-ENTMCNC: 31.7 % — LOW (ref 32–36)
MCV RBC AUTO: 78.8 FL — LOW (ref 80–100)
MCV RBC AUTO: 78.8 FL — LOW (ref 80–100)
MONOCYTES # BLD AUTO: 1.85 K/UL — HIGH (ref 0–0.9)
MONOCYTES NFR BLD AUTO: 12.7 % — SIGNIFICANT CHANGE UP (ref 2–14)
NEUTROPHILS # BLD AUTO: 9.03 K/UL — HIGH (ref 1.8–7.4)
NEUTROPHILS NFR BLD AUTO: 62.2 % — SIGNIFICANT CHANGE UP (ref 43–77)
NRBC # FLD: 0 — SIGNIFICANT CHANGE UP
NRBC # FLD: 0 — SIGNIFICANT CHANGE UP
PHOSPHATE SERPL-MCNC: 2.6 MG/DL — SIGNIFICANT CHANGE UP (ref 2.5–4.5)
PLATELET # BLD AUTO: 433 K/UL — HIGH (ref 150–400)
PLATELET # BLD AUTO: 433 K/UL — HIGH (ref 150–400)
PMV BLD: 11.4 FL — SIGNIFICANT CHANGE UP (ref 7–13)
PMV BLD: 11.4 FL — SIGNIFICANT CHANGE UP (ref 7–13)
POTASSIUM SERPL-MCNC: 3.3 MMOL/L — LOW (ref 3.5–5.3)
POTASSIUM SERPL-SCNC: 3.3 MMOL/L — LOW (ref 3.5–5.3)
RBC # BLD: 4.44 M/UL — SIGNIFICANT CHANGE UP (ref 3.8–5.2)
RBC # BLD: 4.44 M/UL — SIGNIFICANT CHANGE UP (ref 3.8–5.2)
RBC # FLD: 12.8 % — SIGNIFICANT CHANGE UP (ref 10.3–14.5)
RBC # FLD: 12.8 % — SIGNIFICANT CHANGE UP (ref 10.3–14.5)
SODIUM SERPL-SCNC: 136 MMOL/L — SIGNIFICANT CHANGE UP (ref 135–145)
SPECIMEN SOURCE: SIGNIFICANT CHANGE UP
SPECIMEN SOURCE: SIGNIFICANT CHANGE UP
WBC # BLD: 14.63 K/UL — HIGH (ref 3.8–10.5)
WBC # BLD: 14.63 K/UL — HIGH (ref 3.8–10.5)
WBC # FLD AUTO: 14.63 K/UL — HIGH (ref 3.8–10.5)
WBC # FLD AUTO: 14.63 K/UL — HIGH (ref 3.8–10.5)

## 2018-05-09 PROCEDURE — 99223 1ST HOSP IP/OBS HIGH 75: CPT

## 2018-05-09 PROCEDURE — 70450 CT HEAD/BRAIN W/O DYE: CPT | Mod: 26

## 2018-05-09 PROCEDURE — 99233 SBSQ HOSP IP/OBS HIGH 50: CPT | Mod: GC

## 2018-05-09 RX ORDER — POTASSIUM CHLORIDE 20 MEQ
40 PACKET (EA) ORAL ONCE
Qty: 0 | Refills: 0 | Status: COMPLETED | OUTPATIENT
Start: 2018-05-09 | End: 2018-05-09

## 2018-05-09 RX ORDER — INSULIN GLARGINE 100 [IU]/ML
9 INJECTION, SOLUTION SUBCUTANEOUS AT BEDTIME
Qty: 0 | Refills: 0 | Status: DISCONTINUED | OUTPATIENT
Start: 2018-05-09 | End: 2018-05-11

## 2018-05-09 RX ORDER — METHYLPHENIDATE HCL 5 MG
5 TABLET ORAL
Qty: 0 | Refills: 0 | Status: DISCONTINUED | OUTPATIENT
Start: 2018-05-09 | End: 2018-05-11

## 2018-05-09 RX ADMIN — Medication 3 MILLILITER(S): at 16:52

## 2018-05-09 RX ADMIN — HEPARIN SODIUM 5000 UNIT(S): 5000 INJECTION INTRAVENOUS; SUBCUTANEOUS at 05:16

## 2018-05-09 RX ADMIN — Medication 1: at 08:31

## 2018-05-09 RX ADMIN — Medication 1: at 17:25

## 2018-05-09 RX ADMIN — Medication 3 MILLILITER(S): at 11:26

## 2018-05-09 RX ADMIN — SERTRALINE 50 MILLIGRAM(S): 25 TABLET, FILM COATED ORAL at 11:12

## 2018-05-09 RX ADMIN — Medication 40 MILLIEQUIVALENT(S): at 13:53

## 2018-05-09 RX ADMIN — Medication 40 MILLIEQUIVALENT(S): at 10:31

## 2018-05-09 RX ADMIN — Medication 81 MILLIGRAM(S): at 11:12

## 2018-05-09 RX ADMIN — HEPARIN SODIUM 5000 UNIT(S): 5000 INJECTION INTRAVENOUS; SUBCUTANEOUS at 21:55

## 2018-05-09 RX ADMIN — INSULIN GLARGINE 9 UNIT(S): 100 INJECTION, SOLUTION SUBCUTANEOUS at 22:01

## 2018-05-09 RX ADMIN — HEPARIN SODIUM 5000 UNIT(S): 5000 INJECTION INTRAVENOUS; SUBCUTANEOUS at 13:52

## 2018-05-09 RX ADMIN — Medication 3 MILLILITER(S): at 04:40

## 2018-05-09 RX ADMIN — Medication 1: at 12:53

## 2018-05-09 RX ADMIN — ATORVASTATIN CALCIUM 40 MILLIGRAM(S): 80 TABLET, FILM COATED ORAL at 21:55

## 2018-05-09 RX ADMIN — Medication 3 MILLILITER(S): at 23:17

## 2018-05-09 NOTE — CONSULT NOTE ADULT - PROBLEM SELECTOR RECOMMENDATION 9
Patient with elevated Hba1c, likely due to lack of adherence with medication.   According to her daughter, she will be moving from one daughters house to anothers' and will have supervision/help with medication  Hold oral agents during hospitalization  Start low-dose basal insulin with Lantus 9 units.  Her appetite is poor, she has had minimal po intake and postprandial glucose values have been reasonable in the last 24 hours, would continue to monitor without prandial insulin for now.    Recommend discharge on oral agents

## 2018-05-09 NOTE — BEHAVIORAL HEALTH ASSESSMENT NOTE - NSBHSUICPROTECTFACT_PSY_A_CORE
Responsibility to family and others/High spirituality/Future oriented/Supportive social network or family/Positive therapeutic relationships/Identifies reasons for living

## 2018-05-09 NOTE — BEHAVIORAL HEALTH ASSESSMENT NOTE - HPI (INCLUDE ILLNESS QUALITY, SEVERITY, DURATION, TIMING, CONTEXT, MODIFYING FACTORS, ASSOCIATED SIGNS AND SYMPTOMS)
Patient is a 78 y/o Female, retired, , mother of 5 adult children, domiciled with daughters, with PMHx of DM, HTN, HLD, EDUAR, B12 deficiency and narcolepsy, and PPHx of Depression, no hx of psychiatric hospitalizations and no history of self harming behavior, BIB daughter for poor self care, complaining of fatigue and cough. Pt is currently admitted for viral URI. Psychiatry consulted for evaluation of recently reported change in mental status.     Pt found laying in bed, pleasant, cooperative, made good eye contact. She is AAOX4, able to engage appropriately in interview. She admits to having difficulty getting out of bed, and is unable to state why. She denies feeling sad or down, does admit to feeling anxious about what can happen in the future because of Trump, denied panic attacks, denies perceptual disturbances, denies paranoid or grandiose delusions. She denies SI/HI.    Collateral history obtained from daughter Georgie at bedside who reports pt has been slowly and progressively requiring more supervision due to behavioral outbursts and irrational behavior. In February of this year, pt suddenly had an episode of syncope which was unexplained. Around the same time, pt began to do bizarre things like cut her mattress with a scissor because it was "too long." Pt also boxed away important letters, and most recently was found laying naked on the floor, after defecating in a plastic bag. Family has also observed pt not getting out of bed for days, wearing a diaper so that she would not have to walk to the bathroom, not eating and appearing weak. In addition to bizarre behavior, pt has been paranoid, afraid of going on the subway.      Pt has carried a diagnosis of narcolepsy since 11th grade in  and as a result she was unable to complete her education. She was prescribed a stimulant in the past but has stopped taking it because it was too stimulating. Daughter reports pt had suddenly fallen asleep in the middle of conversation all her life.

## 2018-05-09 NOTE — PHYSICAL THERAPY INITIAL EVALUATION ADULT - PATIENT PROFILE REVIEW, REHAB EVAL
No formal activity order, however patient ok for PT Evaluation/Ambulation, as per MADAI Sheriff./yes

## 2018-05-09 NOTE — PROGRESS NOTE ADULT - PROBLEM SELECTOR PLAN 3
- currently patient denies suicidal or homicidal ideation  - PHQ2 negative  - continue with home dose of sertraline - patient presenting with cough and sputum production, with multiple sick contacts  - leukocytosis with monocyte predominance trending down  - CXR negative but exam significant for no wheezes this morning  - likely secondary to URI not detected in RVP   - c/w duonebs standing q6 at this time  - monitor off antibiotics  - HD stable, afebrile

## 2018-05-09 NOTE — BEHAVIORAL HEALTH ASSESSMENT NOTE - NSBHCHARTREVIEWINVESTIGATE_PSY_A_CORE FT
< from: 12 Lead ECG (02.06.18 @ 21:31) >    Ventricular Rate 82 BPM    Atrial Rate 82 BPM    P-R Interval 176 ms    QRS Duration 82 ms    Q-T Interval 382 ms    QTC Calculation(Bezet) 446 ms    P Axis 72 degrees    R Axis 35 degrees    T Axis 50 degrees    Diagnosis Line Normal sinus rhythm  Nonspecific T wave abnormality  Abnormal ECG

## 2018-05-09 NOTE — PROGRESS NOTE ADULT - PROBLEM SELECTOR PLAN 5
- ISS  - hold home PO medications - currently patient denies suicidal or homicidal ideation  - PHQ2 negative  - continue with home dose of sertraline  - psych consult for above concern

## 2018-05-09 NOTE — BEHAVIORAL HEALTH ASSESSMENT NOTE - SUMMARY
Patient is a 78 y/o Female, retired, , mother of 5 adult children, domiciled with daughters, with PMHx of DM, HTN, HLD, EDUAR, B12 deficiency and narcolepsy, and PPHx of Depression, no hx of psychiatric hospitalizations and no history of self harming behavior, BIB daughter for poor self care, complaining of fatigue and cough. Pt is currently admitted for viral URI. Psychiatry consulted for evaluation of recently reported change in mental status.     Pt presents with history of poor oral intake, prolonged bed-rest, using diapers in order to remain in bed without having to get up to use the bathroom, not showering, c/o fatigue, present since February of this year when she had an episode of unexplained syncope. She carries a diagnosis of depression and has been poorly compliant with sertraline. This presentation is consistent with apathy, secondary to an episode of TIA this Feb vs Depression. Pt also has been having behavioral outbursts, paranoia and bizarre behavior over the last 2 years, which is suggestive of an underlying neurocognitive disorder. CT findings in Feb 2018 of chronic microvascular ischemic disease is supportive of this diagnosis.    1) Please initiate Ritalin 5mg PO Q 8am and 12am to active pt. This medication has been shown to induce appetite at this dose for 25% of pts and is not known to suppress apetite.    2) Continue Sertraline 50mg PO daily    3) Consider MRI brain to r/o Fronto-temporal dementia in light of the bizarre behavior and paranoia    4) Check TSH, B12, folate and RPR

## 2018-05-09 NOTE — BEHAVIORAL HEALTH ASSESSMENT NOTE - NSBHCHARTREVIEWVS_PSY_A_CORE FT
Vital Signs Last 24 Hrs  T(C): 36.9 (09 May 2018 13:51), Max: 37.4 (08 May 2018 21:36)  T(F): 98.5 (09 May 2018 13:51), Max: 99.4 (08 May 2018 21:36)  HR: 83 (09 May 2018 13:51) (79 - 90)  BP: 140/49 (09 May 2018 13:51) (140/49 - 162/80)  BP(mean): --  RR: 18 (09 May 2018 13:51) (18 - 84)  SpO2: 99% (09 May 2018 13:51) (95% - 99%)

## 2018-05-09 NOTE — PROGRESS NOTE ADULT - PROBLEM SELECTOR PLAN 2
- outpatient sleep studies indicate EDUAR  - patients fatigue likely due to noncompliance with CPAP  - will ensure CPAP used inpatient - per daughter new hearing loss in the right ear  - unclear whether organic pathology in setting of URI or psychogenic hearing loss  - will obtain otoscope to evaluate for inflammation or impaction

## 2018-05-09 NOTE — PROGRESS NOTE ADULT - PROBLEM SELECTOR PLAN 1
- patient presenting with cough and sputum production, with multiple sick contacts  - leukocytosis with monocyte predominance  - CXR negative but exam significant for expiratory wheezes  - likely secondary to URI- follow up RVP  - c/w duonebs standing q6 at this time  - monitor off antibiotics  - HD stable, afebrile - patient telling physicians and daughter different stories  - waxing and waning coherency throughout evaluation today  - daughter believes patient is also faking her hearing loss which is new since friday  - reported story of patient found surrounded by feces at home  - psych consult  - CT head to evaluate for any structural abnormality

## 2018-05-09 NOTE — BEHAVIORAL HEALTH ASSESSMENT NOTE - NSBHCHARTREVIEWLAB_PSY_A_CORE FT
05-09    136  |  95<L>  |  9   ----------------------------<  161<H>  3.3<L>   |  26  |  0.74    Ca    9.4      09 May 2018 06:15  Phos  2.6     05-09  Mg     1.7     05-09    TPro  8.1  /  Alb  3.4  /  TBili  0.7  /  DBili  x   /  AST  12  /  ALT  12  /  AlkPhos  116  05-08  LIVER FUNCTIONS - ( 08 May 2018 02:00 )  Alb: 3.4 g/dL / Pro: 8.1 g/dL / ALK PHOS: 116 u/L / ALT: 12 u/L / AST: 12 u/L / GGT: x         CBC Full  -  ( 09 May 2018 06:15 )  WBC Count : 14.63 K/uL  Hemoglobin : 11.1 g/dL  Hematocrit : 35.0 %  Platelet Count - Automated : 433 K/uL  Mean Cell Volume : 78.8 fL  Mean Cell Hemoglobin : 25.0 pg  Mean Cell Hemoglobin Concentration : 31.7 %  Auto Neutrophil # : 9.03 K/uL  Auto Lymphocyte # : 3.23 K/uL  Auto Monocyte # : 1.85 K/uL  Auto Eosinophil # : 0.07 K/uL  Auto Basophil # : 0.05 K/uL  Auto Neutrophil % : 62.2 %  Auto Lymphocyte % : 22.2 %  Auto Monocyte % : 12.7 %  Auto Eosinophil % : 0.5 %  Auto Basophil % : 0.3 %

## 2018-05-09 NOTE — PROGRESS NOTE ADULT - SUBJECTIVE AND OBJECTIVE BOX
Karen Cesario PGY 1  Pager: [phone]/ [phone]    Patient is a 77y old  Female who presents with a chief complaint of fatigue (08 May 2018 11:20)      SUBJECTIVE / OVERNIGHT EVENTS:  Patient seen and examined at bedside. Patient reports she feels markedly better than yesterday, with improvement in her breathing and improvement in her ability to swallow.     Other Review of Systems Negative.    MEDICATIONS  (STANDING):  ALBUTerol/ipratropium for Nebulization 3 milliLiter(s) Nebulizer every 6 hours  amLODIPine   Tablet 10 milliGRAM(s) Oral daily  aspirin enteric coated 81 milliGRAM(s) Oral daily  atorvastatin 40 milliGRAM(s) Oral at bedtime  dextrose 5%. 1000 milliLiter(s) (50 mL/Hr) IV Continuous <Continuous>  dextrose 50% Injectable 12.5 Gram(s) IV Push once  dextrose 50% Injectable 25 Gram(s) IV Push once  dextrose 50% Injectable 25 Gram(s) IV Push once  heparin  Injectable 5000 Unit(s) SubCutaneous every 8 hours  insulin lispro (HumaLOG) corrective regimen sliding scale   SubCutaneous three times a day before meals  insulin lispro (HumaLOG) corrective regimen sliding scale   SubCutaneous at bedtime  lactated ringers. 1000 milliLiter(s) (100 mL/Hr) IV Continuous <Continuous>  lisinopril 20 milliGRAM(s) Oral daily  sertraline 50 milliGRAM(s) Oral daily    MEDICATIONS  (PRN):  benzocaine 15 mG/menthol 3.6 mG Lozenge 1 Lozenge Oral four times a day PRN Sore Throat  dextrose 40% Gel 15 Gram(s) Oral once PRN Blood Glucose LESS THAN 70 milliGRAM(s)/deciliter  glucagon  Injectable 1 milliGRAM(s) IntraMuscular once PRN Glucose LESS THAN 70 milligrams/deciliter      OBJECTIVE:    Vital Signs Last 24 Hrs  T(C): 36.5 (09 May 2018 05:01), Max: 37.4 (08 May 2018 11:03)  T(F): 97.7 (09 May 2018 05:01), Max: 99.4 (08 May 2018 21:36)  HR: 80 (09 May 2018 07:20) (78 - 90)  BP: 162/80 (08 May 2018 21:36) (124/53 - 162/80)  BP(mean): --  RR: 18 (09 May 2018 05:01) (16 - 84)  SpO2: 96% (09 May 2018 07:20) (95% - 99%)    CAPILLARY BLOOD GLUCOSE      POCT Blood Glucose.: 159 mg/dL (09 May 2018 08:26)  POCT Blood Glucose.: 197 mg/dL (08 May 2018 22:47)  POCT Blood Glucose.: 213 mg/dL (08 May 2018 17:06)  POCT Blood Glucose.: 180 mg/dL (08 May 2018 11:55)    I&O's Summary    08 May 2018 07:01  -  09 May 2018 07:00  --------------------------------------------------------  IN: 800 mL / OUT: 400 mL / NET: 400 mL        PHYSICAL EXAM:  GENERAL: NAD, well-developed  HEAD:  Atraumatic, Normocephalic  EYES: EOMI, PERRLA, conjunctiva and sclera clear  NECK: Supple, No JVD  CHEST/LUNG: Clear to auscultation bilaterally; No wheeze  HEART: Regular rate and rhythm; No murmurs, rubs, or gallops  ABDOMEN: Soft, Nontender, Nondistended; Bowel sounds present  EXTREMITIES:  2+ Peripheral Pulses, No clubbing, cyanosis, or edema  PSYCH: AAOx3. normal mood, good affect  NEUROLOGY: non-focal  SKIN: No rashes or lesions    LABS:                        11.1   14.63 )-----------( 433      ( 09 May 2018 06:15 )             35.0     Auto Eosinophil # 0.07  / Auto Eosinophil % 0.5   / Auto Neutrophil # 9.03  / Auto Neutrophil % 62.2  / BANDS % x                            12.5   19.90 )-----------( 444      ( 08 May 2018 02:00 )             39.3     Auto Eosinophil # 0.03  / Auto Eosinophil % 0.2   / Auto Neutrophil # 13.33 / Auto Neutrophil % 66.9  / BANDS % 2.7      -09    136  |  95<L>  |  9   ----------------------------<  161<H>  3.3<L>   |  26  |  0.74      132<L>  |  92<L>  |  18  ----------------------------<  254<H>  4.0   |  25  |  0.93    Ca    9.4      09 May 2018 06:15  Mg     1.7       Phos  2.6       TPro  8.1  /  Alb  3.4  /  TBili  0.7  /  DBili  x   /  AST  12  /  ALT  12  /  AlkPhos  116            Urinalysis Basic - ( 08 May 2018 02:00 )    Color: YELLOW / Appearance: HAZY / S.022 / pH: 6.5  Gluc: 100 / Ketone: NEGATIVE  / Bili: NEGATIVE / Urobili: NORMAL mg/dL   Blood: NEGATIVE / Protein: 30 mg/dL / Nitrite: NEGATIVE   Leuk Esterase: NEGATIVE / RBC: 0-2 / WBC 2-5   Sq Epi: OCC / Non Sq Epi: x / Bacteria: x Karen Cesario PGY 1  Pager: [phone]/ [phone]    Patient is a 77y old  Female who presents with a chief complaint of fatigue (08 May 2018 11:20)      SUBJECTIVE / OVERNIGHT EVENTS:  Patient seen and examined at bedside. Patient reports she feels markedly better than yesterday, with improvement in her breathing and improvement in her ability to swallow.     Other Review of Systems Negative.    MEDICATIONS  (STANDING):  ALBUTerol/ipratropium for Nebulization 3 milliLiter(s) Nebulizer every 6 hours  amLODIPine   Tablet 10 milliGRAM(s) Oral daily  aspirin enteric coated 81 milliGRAM(s) Oral daily  atorvastatin 40 milliGRAM(s) Oral at bedtime  dextrose 5%. 1000 milliLiter(s) (50 mL/Hr) IV Continuous <Continuous>  dextrose 50% Injectable 12.5 Gram(s) IV Push once  dextrose 50% Injectable 25 Gram(s) IV Push once  dextrose 50% Injectable 25 Gram(s) IV Push once  heparin  Injectable 5000 Unit(s) SubCutaneous every 8 hours  insulin lispro (HumaLOG) corrective regimen sliding scale   SubCutaneous three times a day before meals  insulin lispro (HumaLOG) corrective regimen sliding scale   SubCutaneous at bedtime  lactated ringers. 1000 milliLiter(s) (100 mL/Hr) IV Continuous <Continuous>  lisinopril 20 milliGRAM(s) Oral daily  sertraline 50 milliGRAM(s) Oral daily    MEDICATIONS  (PRN):  benzocaine 15 mG/menthol 3.6 mG Lozenge 1 Lozenge Oral four times a day PRN Sore Throat  dextrose 40% Gel 15 Gram(s) Oral once PRN Blood Glucose LESS THAN 70 milliGRAM(s)/deciliter  glucagon  Injectable 1 milliGRAM(s) IntraMuscular once PRN Glucose LESS THAN 70 milligrams/deciliter      OBJECTIVE:    Vital Signs Last 24 Hrs  T(C): 36.5 (09 May 2018 05:01), Max: 37.4 (08 May 2018 11:03)  T(F): 97.7 (09 May 2018 05:01), Max: 99.4 (08 May 2018 21:36)  HR: 80 (09 May 2018 07:20) (78 - 90)  BP: 162/80 (08 May 2018 21:36) (124/53 - 162/80)  BP(mean): --  RR: 18 (09 May 2018 05:01) (16 - 84)  SpO2: 96% (09 May 2018 07:20) (95% - 99%)    CAPILLARY BLOOD GLUCOSE      POCT Blood Glucose.: 159 mg/dL (09 May 2018 08:26)  POCT Blood Glucose.: 197 mg/dL (08 May 2018 22:47)  POCT Blood Glucose.: 213 mg/dL (08 May 2018 17:06)  POCT Blood Glucose.: 180 mg/dL (08 May 2018 11:55)    I&O's Summary    08 May 2018 07:01  -  09 May 2018 07:00  --------------------------------------------------------  IN: 800 mL / OUT: 400 mL / NET: 400 mL        PHYSICAL EXAM:  GENERAL: NAD, well-developed  HEAD:  Atraumatic, Normocephalic  EYES: EOMI, PERRLA, conjunctiva and sclera clear  NECK: Supple, No JVD  CHEST/LUNG: Clear to auscultation bilaterally; No wheeze  HEART: Regular rate and rhythm; No murmurs, rubs, or gallops  ABDOMEN: Soft, Nontender, Nondistended; Bowel sounds present  EXTREMITIES:  2+ Peripheral Pulses, No clubbing, cyanosis, or edema  PSYCH: AAOx2. normal mood, good affect  NEUROLOGY: non-focal  SKIN: No rashes or lesions    LABS:                        11.1   14.63 )-----------( 433      ( 09 May 2018 06:15 )             35.0     Auto Eosinophil # 0.07  / Auto Eosinophil % 0.5   / Auto Neutrophil # 9.03  / Auto Neutrophil % 62.2  / BANDS % x                            12.5   19.90 )-----------( 444      ( 08 May 2018 02:00 )             39.3     Auto Eosinophil # 0.03  / Auto Eosinophil % 0.2   / Auto Neutrophil # 13.33 / Auto Neutrophil % 66.9  / BANDS % 2.7      -09    136  |  95<L>  |  9   ----------------------------<  161<H>  3.3<L>   |  26  |  0.74      132<L>  |  92<L>  |  18  ----------------------------<  254<H>  4.0   |  25  |  0.93    Ca    9.4      09 May 2018 06:15  Mg     1.7       Phos  2.6       TPro  8.1  /  Alb  3.4  /  TBili  0.7  /  DBili  x   /  AST  12  /  ALT  12  /  AlkPhos  116            Urinalysis Basic - ( 08 May 2018 02:00 )    Color: YELLOW / Appearance: HAZY / S.022 / pH: 6.5  Gluc: 100 / Ketone: NEGATIVE  / Bili: NEGATIVE / Urobili: NORMAL mg/dL   Blood: NEGATIVE / Protein: 30 mg/dL / Nitrite: NEGATIVE   Leuk Esterase: NEGATIVE / RBC: 0-2 / WBC 2-5   Sq Epi: OCC / Non Sq Epi: x / Bacteria: x Karen Cesario PGY 1  Pager: [phone]/ [phone]    Patient is a 77y old  Female who presents with a chief complaint of fatigue (08 May 2018 11:20)      SUBJECTIVE / OVERNIGHT EVENTS:  Patient seen and examined at bedside. Patient reports she feels markedly better than yesterday, with improvement in her breathing and improvement in her ability to swallow.  Pt apparently told daughter different story, reporting she was told she was not allowed to eat 2/2 severe stomach virus which was not the case. Daughter at bedside reports she believes her symptoms are volitional.    Other Review of Systems Negative.    MEDICATIONS  (STANDING):  ALBUTerol/ipratropium for Nebulization 3 milliLiter(s) Nebulizer every 6 hours  amLODIPine   Tablet 10 milliGRAM(s) Oral daily  aspirin enteric coated 81 milliGRAM(s) Oral daily  atorvastatin 40 milliGRAM(s) Oral at bedtime  dextrose 5%. 1000 milliLiter(s) (50 mL/Hr) IV Continuous <Continuous>  dextrose 50% Injectable 12.5 Gram(s) IV Push once  dextrose 50% Injectable 25 Gram(s) IV Push once  dextrose 50% Injectable 25 Gram(s) IV Push once  heparin  Injectable 5000 Unit(s) SubCutaneous every 8 hours  insulin lispro (HumaLOG) corrective regimen sliding scale   SubCutaneous three times a day before meals  insulin lispro (HumaLOG) corrective regimen sliding scale   SubCutaneous at bedtime  lactated ringers. 1000 milliLiter(s) (100 mL/Hr) IV Continuous <Continuous>  lisinopril 20 milliGRAM(s) Oral daily  sertraline 50 milliGRAM(s) Oral daily    MEDICATIONS  (PRN):  benzocaine 15 mG/menthol 3.6 mG Lozenge 1 Lozenge Oral four times a day PRN Sore Throat  dextrose 40% Gel 15 Gram(s) Oral once PRN Blood Glucose LESS THAN 70 milliGRAM(s)/deciliter  glucagon  Injectable 1 milliGRAM(s) IntraMuscular once PRN Glucose LESS THAN 70 milligrams/deciliter      OBJECTIVE:    Vital Signs Last 24 Hrs  T(C): 36.5 (09 May 2018 05:01), Max: 37.4 (08 May 2018 11:03)  T(F): 97.7 (09 May 2018 05:01), Max: 99.4 (08 May 2018 21:36)  HR: 80 (09 May 2018 07:20) (78 - 90)  BP: 162/80 (08 May 2018 21:36) (124/53 - 162/80)  BP(mean): --  RR: 18 (09 May 2018 05:01) (16 - 84)  SpO2: 96% (09 May 2018 07:20) (95% - 99%)    CAPILLARY BLOOD GLUCOSE      POCT Blood Glucose.: 159 mg/dL (09 May 2018 08:26)  POCT Blood Glucose.: 197 mg/dL (08 May 2018 22:47)  POCT Blood Glucose.: 213 mg/dL (08 May 2018 17:06)  POCT Blood Glucose.: 180 mg/dL (08 May 2018 11:55)    I&O's Summary    08 May 2018 07:01  -  09 May 2018 07:00  --------------------------------------------------------  IN: 800 mL / OUT: 400 mL / NET: 400 mL        PHYSICAL EXAM:  GENERAL: NAD, well-developed  HEAD:  Atraumatic, Normocephalic  EYES: EOMI, PERRLA, conjunctiva and sclera clear  NECK: Supple, No JVD  CHEST/LUNG: Clear to auscultation bilaterally; No wheeze  HEART: Regular rate and rhythm; No murmurs, rubs, or gallops  ABDOMEN: Soft, Nontender, Nondistended; Bowel sounds present  EXTREMITIES:  2+ Peripheral Pulses, No clubbing, cyanosis, or edema  PSYCH: AAOx2. normal mood, good affect  NEUROLOGY: non-focal  SKIN: No rashes or lesions    LABS:                        11.1   14.63 )-----------( 433      ( 09 May 2018 06:15 )             35.0     Auto Eosinophil # 0.07  / Auto Eosinophil % 0.5   / Auto Neutrophil # 9.03  / Auto Neutrophil % 62.2  / BANDS % x                            12.5   19.90 )-----------( 444      ( 08 May 2018 02:00 )             39.3     Auto Eosinophil # 0.03  / Auto Eosinophil % 0.2   / Auto Neutrophil # 13.33 / Auto Neutrophil % 66.9  / BANDS % 2.7      05-09    136  |  95<L>  |  9   ----------------------------<  161<H>  3.3<L>   |  26  |  0.74      132<L>  |  92<L>  |  18  ----------------------------<  254<H>  4.0   |  25  |  0.93    Ca    9.4      09 May 2018 06:15  Mg     1.7       Phos  2.6       TPro  8.1  /  Alb  3.4  /  TBili  0.7  /  DBili  x   /  AST  12  /  ALT  12  /  AlkPhos  116            Urinalysis Basic - ( 08 May 2018 02:00 )    Color: YELLOW / Appearance: HAZY / S.022 / pH: 6.5  Gluc: 100 / Ketone: NEGATIVE  / Bili: NEGATIVE / Urobili: NORMAL mg/dL   Blood: NEGATIVE / Protein: 30 mg/dL / Nitrite: NEGATIVE   Leuk Esterase: NEGATIVE / RBC: 0-2 / WBC 2-5   Sq Epi: OCC / Non Sq Epi: x / Bacteria: x

## 2018-05-09 NOTE — BEHAVIORAL HEALTH ASSESSMENT NOTE - CASE SUMMARY
77 year old BF with depression and mild dementia   1-would start Ritalin 5mg PO Q8am and Qnoon  2-prn haldol , will follow up

## 2018-05-09 NOTE — PROGRESS NOTE ADULT - ASSESSMENT
77F with PMHx of DM, HTN, HLD, EDUAR, depression and B12 deficiency  is brought in by her daughter for lack of self care, complaining of fatigue and cough, admitted for likely viral URI, qith marked 77F with PMHx of DM, HTN, HLD, EDUAR, depression and B12 deficiency  is brought in by her daughter for lack of self care, complaining of fatigue and cough, admitted for URI, with strange behaviors this morning and afternoon.

## 2018-05-09 NOTE — BEHAVIORAL HEALTH ASSESSMENT NOTE - NSBHCONSULTMEDAGITATION_PSY_A_CORE FT
Seroquel 25mg PO Q6 PRN, monitor QTc and hold for >500ms haldol 1mg PO Q6 PRN, monitor QTc and hold for >500ms

## 2018-05-09 NOTE — CONSULT NOTE ADULT - SUBJECTIVE AND OBJECTIVE BOX
HPI:  77 year old woman with T2DM, admitted with fatigue and lack of self care.  History from pt and daughter.  She has had T2DM for 30 years, had been treated with insulin in the past but then transitioned to oral agents.  Diabetes is managed by her PMD and until recently was well controlled, with Hba1c at goal at last visit about 3 months ago.   Since then she has stopped taking her medication, and has not been checking glucose values.  She is not sure where her glucometer is.  She reports no nephropathy, neuropathy or retinopathy, though recently has been telling her children about tingling in her hands.  Her appetite has been poor and she has not been eating much recently.     PAST MEDICAL & SURGICAL HISTORY:  HTN  Dyslipidemia  Diabetes mellitus type II  S/P hysterectomy  S/P appendectomy  Diverticulitis (required surgery)      FAMILY HISTORY:  Father , had diabetes, LE amputations  Mother , dementia    Social History: Lives with daughter, former smoker, no alcohol or drug use    Outpatient Medications:  · 	alendronate 70 mg oral tablet: 1 tab(s) orally once a week, Last Dose Taken:    · 	amLODIPine 10 mg oral tablet: 1 tab(s) orally once a day, Last Dose Taken:    · 	Lipitor 10 mg oral tablet: 1 tab(s) orally once a day, Last Dose Taken:    · 	lisinopril 20 mg oral tablet: 1 tab(s) orally once a day, Last Dose Taken:    · 	Janumet 50 mg-1000 mg oral tablet: 1 tab(s) orally 2 times a day, Last Dose Taken:    · 	pioglitazone 15 mg oral tablet: 1 tab(s) orally once a day  · 	sertraline 50 mg oral tablet: 1 tab(s) orally once a day  · 	Aspir 81 oral delayed release tablet: 1 tab(s) orally once a day, Last Dose Taken:          MEDICATIONS  (STANDING):  ALBUTerol/ipratropium for Nebulization 3 milliLiter(s) Nebulizer every 6 hours  amLODIPine   Tablet 10 milliGRAM(s) Oral daily  aspirin enteric coated 81 milliGRAM(s) Oral daily  atorvastatin 40 milliGRAM(s) Oral at bedtime  dextrose 5%. 1000 milliLiter(s) (50 mL/Hr) IV Continuous <Continuous>  dextrose 50% Injectable 12.5 Gram(s) IV Push once  dextrose 50% Injectable 25 Gram(s) IV Push once  dextrose 50% Injectable 25 Gram(s) IV Push once  heparin  Injectable 5000 Unit(s) SubCutaneous every 8 hours  insulin lispro (HumaLOG) corrective regimen sliding scale   SubCutaneous three times a day before meals  insulin lispro (HumaLOG) corrective regimen sliding scale   SubCutaneous at bedtime  lactated ringers. 1000 milliLiter(s) (100 mL/Hr) IV Continuous <Continuous>  lisinopril 20 milliGRAM(s) Oral daily  methylphenidate 5 milliGRAM(s) Oral <User Schedule>  sertraline 50 milliGRAM(s) Oral daily    MEDICATIONS  (PRN):  benzocaine 15 mG/menthol 3.6 mG Lozenge 1 Lozenge Oral four times a day PRN Sore Throat  dextrose 40% Gel 15 Gram(s) Oral once PRN Blood Glucose LESS THAN 70 milliGRAM(s)/deciliter  glucagon  Injectable 1 milliGRAM(s) IntraMuscular once PRN Glucose LESS THAN 70 milligrams/deciliter      Allergies    No Known Allergies    Review of Systems:  Constitutional: No fever  Eyes: No blurry vision  Neuro: No headache, has had difficulty hearing for past few days  HEENT: No throat pain  Cardiovascular: No chest pain  Respiratory: No SOB, no cough  GI: No abdominal pain  : No dysuria  Skin: no rash  Endocrine: as noted in HPI  Hem/lymph: no swelling      PHYSICAL EXAM:  VITALS: T(C): 36.9 (18 @ 13:51)  T(F): 98.5 (18 @ 13:51), Max: 99.4 (18 @ 21:36)  HR: 85 (18 @ 16:53) (79 - 90)  BP: 140/49 (18 @ 13:51) (140/49 - 162/80)  RR:  (18 - 84)  SpO2:  (95% - 99%)  Wt(kg): 70.5  GENERAL: Lying in bed in NAD,   EYES: No proptosis,  HEENT:  Atraumatic, Normocephalic,   THYROID: Normal size, no palpable nodules  RESPIRATORY: Clear to auscultation bilaterally  CARDIOVASCULAR: Regular rhythm; No murmurs; no peripheral edema  GI: Soft, nontender, non distended, normal bowel sounds  SKIN: Dry, intact, No rashes or lesions  MUSCULOSKELETAL: normal strength  NEURO: extraocular movements intact,  normal reflexes  CUSHING'S SIGNS: no striae    POCT Blood Glucose.: 170 mg/dL (18 @ 17:16)  POCT Blood Glucose.: 169 mg/dL (18 @ 12:11)  POCT Blood Glucose.: 159 mg/dL (18 @ 08:26)  POCT Blood Glucose.: 197 mg/dL (18 @ 22:47)  POCT Blood Glucose.: 213 mg/dL (18 @ 17:06)  POCT Blood Glucose.: 180 mg/dL (18 @ 11:55)  POCT Blood Glucose.: 233 mg/dL (18 @ 23:01)                            11.1   14.63 )-----------( 433      ( 09 May 2018 06:15 )             35.0           136  |  95<L>  |  9   ----------------------------<  161<H>  3.3<L>   |  26  |  0.74    EGFR if : 92  EGFR if non : 79    Ca    9.4        Mg     1.7       Phos  2.6         TPro  8.1  /  Alb  3.4  /  TBili  0.7  /  DBili  x   /  AST  12  /  ALT  12  /  AlkPhos  116      Thyroid Function Tests:   @ 02:00 TSH 0.62     Hemoglobin A1C, Whole Blood: 11.0 % <H> [4.0 - 5.6] (18 @ 06:15)        Radiology:   EXAM:  XR CHEST PA LAT 2V        PROCEDURE DATE:  May  8 2018         INTERPRETATION:  HISTORY: Cough    TECHNIQUE: Frontal and lateral chest x-ray    COMPARISON: Chest x-ray from 2022    FINDINGS:    No focal consolidation.  There is no pneumothorax. There are no pleural effusions.   The cardiomediastinal silhouette is unremarkable.    IMPRESSION:   Clear lungs.       EXAM:  CT BRAIN        PROCEDURE DATE:  May  9 2018         INTERPRETATION:  History: Altered consciousness.    Multiple axial sections were performed from base of skull to vertex   without contrast enhancement. Coronal and sagittal reconstructionswere   performed as well    This exam is compared with prior noncontrast head CT performed on   3/6/2018.    Parenchymal volume loss and chronic microvascular changes are again seen   and unchanged.    There is no evidence of acute hemorrhage mass mass effect in the   posterior fossa or supratentorial region.    Evaluation of the osseous structures with the appropriate window appears   unremarkable    Bilateral ethmoid sinus mucosal thickening is seen with air-fluid levels.   Air-fluid levels areseen involving both sphenoid sinuses. These findings   are new when compared with the prior study and likely are compatible with   acute sinusitis. Please correlate clinically.    Partial opacification of both mastoids are seen which could be compatible   underlying inflammatory or infectious etiology. Clinical correlation is   recommended.    Impression: No evidence of acute hemorrhage, mass or mass effect.

## 2018-05-09 NOTE — BEHAVIORAL HEALTH ASSESSMENT NOTE - NSBHCHARTREVIEWIMAGING_PSY_A_CORE FT
EXAM:  CT BRAIN      PROCEDURE DATE:  Feb 6 2018     There is mild cerebral volume loss. There is mild patchy white matter   hypoattenuation which is nonspecific in etiology but likely related to   chronic microvascular ischemic disease.    IMPRESSION:   No CT evidence of acute intracranial hemorrhage, brain edema, or mass   effect.     LAURYN RICHARDSON M.D., RADIOLOGY RESIDENT  This document has been electronically signed.  YANDY CROWE M.D, ATTENDING RADIOLOGIST  This document has been electronically signed. Feb 7 2018 12:38AM

## 2018-05-10 LAB
BUN SERPL-MCNC: 8 MG/DL — SIGNIFICANT CHANGE UP (ref 7–23)
CALCIUM SERPL-MCNC: 9.7 MG/DL — SIGNIFICANT CHANGE UP (ref 8.4–10.5)
CHLORIDE SERPL-SCNC: 97 MMOL/L — LOW (ref 98–107)
CO2 SERPL-SCNC: 29 MMOL/L — SIGNIFICANT CHANGE UP (ref 22–31)
CREAT SERPL-MCNC: 0.81 MG/DL — SIGNIFICANT CHANGE UP (ref 0.5–1.3)
GLUCOSE SERPL-MCNC: 126 MG/DL — HIGH (ref 70–99)
HCT VFR BLD CALC: 36.5 % — SIGNIFICANT CHANGE UP (ref 34.5–45)
HGB BLD-MCNC: 11.3 G/DL — LOW (ref 11.5–15.5)
MAGNESIUM SERPL-MCNC: 1.7 MG/DL — SIGNIFICANT CHANGE UP (ref 1.6–2.6)
MCHC RBC-ENTMCNC: 24.9 PG — LOW (ref 27–34)
MCHC RBC-ENTMCNC: 31 % — LOW (ref 32–36)
MCV RBC AUTO: 80.6 FL — SIGNIFICANT CHANGE UP (ref 80–100)
NRBC # FLD: 0 — SIGNIFICANT CHANGE UP
PHOSPHATE SERPL-MCNC: 2.6 MG/DL — SIGNIFICANT CHANGE UP (ref 2.5–4.5)
PLATELET # BLD AUTO: 475 K/UL — HIGH (ref 150–400)
PMV BLD: 10.8 FL — SIGNIFICANT CHANGE UP (ref 7–13)
POTASSIUM SERPL-MCNC: 4.4 MMOL/L — SIGNIFICANT CHANGE UP (ref 3.5–5.3)
POTASSIUM SERPL-SCNC: 4.4 MMOL/L — SIGNIFICANT CHANGE UP (ref 3.5–5.3)
RBC # BLD: 4.53 M/UL — SIGNIFICANT CHANGE UP (ref 3.8–5.2)
RBC # FLD: 13.1 % — SIGNIFICANT CHANGE UP (ref 10.3–14.5)
SODIUM SERPL-SCNC: 137 MMOL/L — SIGNIFICANT CHANGE UP (ref 135–145)
T PALLIDUM AB TITR SER: NEGATIVE — SIGNIFICANT CHANGE UP
WBC # BLD: 15.39 K/UL — HIGH (ref 3.8–10.5)
WBC # FLD AUTO: 15.39 K/UL — HIGH (ref 3.8–10.5)

## 2018-05-10 PROCEDURE — 99232 SBSQ HOSP IP/OBS MODERATE 35: CPT

## 2018-05-10 PROCEDURE — 99233 SBSQ HOSP IP/OBS HIGH 50: CPT

## 2018-05-10 PROCEDURE — 99232 SBSQ HOSP IP/OBS MODERATE 35: CPT | Mod: GC

## 2018-05-10 RX ORDER — INSULIN LISPRO 100/ML
2 VIAL (ML) SUBCUTANEOUS
Qty: 0 | Refills: 0 | Status: DISCONTINUED | OUTPATIENT
Start: 2018-05-10 | End: 2018-05-11

## 2018-05-10 RX ADMIN — Medication 2 UNIT(S): at 18:02

## 2018-05-10 RX ADMIN — INSULIN GLARGINE 9 UNIT(S): 100 INJECTION, SOLUTION SUBCUTANEOUS at 22:21

## 2018-05-10 RX ADMIN — Medication 5 MILLIGRAM(S): at 08:51

## 2018-05-10 RX ADMIN — LISINOPRIL 20 MILLIGRAM(S): 2.5 TABLET ORAL at 05:40

## 2018-05-10 RX ADMIN — AMLODIPINE BESYLATE 10 MILLIGRAM(S): 2.5 TABLET ORAL at 05:40

## 2018-05-10 RX ADMIN — SERTRALINE 50 MILLIGRAM(S): 25 TABLET, FILM COATED ORAL at 13:00

## 2018-05-10 RX ADMIN — Medication 2: at 12:59

## 2018-05-10 RX ADMIN — ATORVASTATIN CALCIUM 40 MILLIGRAM(S): 80 TABLET, FILM COATED ORAL at 22:21

## 2018-05-10 RX ADMIN — Medication 3 MILLILITER(S): at 16:20

## 2018-05-10 RX ADMIN — Medication 3 MILLILITER(S): at 22:31

## 2018-05-10 RX ADMIN — HEPARIN SODIUM 5000 UNIT(S): 5000 INJECTION INTRAVENOUS; SUBCUTANEOUS at 13:01

## 2018-05-10 RX ADMIN — HEPARIN SODIUM 5000 UNIT(S): 5000 INJECTION INTRAVENOUS; SUBCUTANEOUS at 22:21

## 2018-05-10 RX ADMIN — Medication 3 MILLILITER(S): at 09:49

## 2018-05-10 RX ADMIN — Medication 81 MILLIGRAM(S): at 13:00

## 2018-05-10 RX ADMIN — Medication 1: at 18:02

## 2018-05-10 RX ADMIN — Medication 3 MILLILITER(S): at 05:04

## 2018-05-10 RX ADMIN — Medication 5 MILLIGRAM(S): at 13:01

## 2018-05-10 RX ADMIN — HEPARIN SODIUM 5000 UNIT(S): 5000 INJECTION INTRAVENOUS; SUBCUTANEOUS at 05:40

## 2018-05-10 NOTE — PROGRESS NOTE BEHAVIORAL HEALTH - NSBHFUPINTERVALHXFT_PSY_A_CORE
No acute events overnight, no PRN meds given. Pt was found sitting up eating breakfast. She was pleasant, cooperativ, initially had difficulty hearing writer and when spoken to loudly was able to engage appropriately. She reported feeling more energetic and having an appetite.    Staff reports pt has been seen out of bed walking and eating without difficulty.

## 2018-05-10 NOTE — PROGRESS NOTE ADULT - PROBLEM SELECTOR PLAN 1
- patient with history of poor oral intake, prolonged bed-rest, using diapers in order to remain in bed without having to get up to use the bathroom, not showering, c/o fatigue  - patient reporting different stories to physicians and daughter  - psych recs appreciated: will administer low dose ritalin and evaluate response to stimulant  - CT head to rule out organic pathology- B12, TSH WNL, follow up RPR

## 2018-05-10 NOTE — PROGRESS NOTE ADULT - PROBLEM SELECTOR PLAN 6
- outpatient sleep studies indicate EDUAR  - component of  patients fatigue likely due to noncompliance with CPAP  - will ensure CPAP used inpatient, patient used for 3-4 hours overnight

## 2018-05-10 NOTE — PROGRESS NOTE ADULT - PROBLEM SELECTOR PLAN 2
- patient telling physicians and daughter different stories  - waxing and waning coherency throughout evaluation today  - daughter believes patient is also faking her hearing loss which is new since friday  - reported story of patient found surrounded by feces at home  - psych consult  - CT head to evaluate for any structural abnormality - patient telling physicians and daughter different stories  - waxing and waning coherency throughout evaluation today  - daughter believes patient is also faking her hearing loss which is new since friday  - psych consult appreciated   - CT head wnl

## 2018-05-10 NOTE — PROGRESS NOTE ADULT - ASSESSMENT
77F with PMHx of DM, HTN, HLD, EDUAR, depression and B12 deficiency  is brought in by her daughter for lack of self care, complaining of fatigue and cough, admitted for URI, with strange behaviors this morning and afternoon.

## 2018-05-10 NOTE — PROGRESS NOTE ADULT - PROBLEM SELECTOR PLAN 4
- per daughter new hearing loss in the right ear  - otoscopic exam shows inflammation in the b/l ears R>L likely in setting of viral URI

## 2018-05-10 NOTE — PROGRESS NOTE BEHAVIORAL HEALTH - NSBHCHARTREVIEWIMAGING_PSY_A_CORE FT
< from: CT Head No Cont (05.09.18 @ 15:39) >    Parenchymal volume loss and chronic microvascular changes are again seen   and unchanged.    Impression: No evidence of acute hemorrhage, mass or mass effect.    IZABELA HAYES M.D., ATTENDING RADIOLOGIST  This document has been electronically signed. May  9 2018  3:54PM

## 2018-05-10 NOTE — PROGRESS NOTE ADULT - PROBLEM SELECTOR PLAN 5
- patient presenting with cough and sputum production, with multiple sick contacts  - leukocytosis with monocyte predominance trending down  - improvement in cough and sore throat- continues to have sputum production  - afebrile at this time- monitor off abx

## 2018-05-10 NOTE — PROGRESS NOTE ADULT - SUBJECTIVE AND OBJECTIVE BOX
Karen Nassar PGY 1  Pager: [phone]/ [phone]    Patient is a 77y old  Female who presents with a chief complaint of fatigue (08 May 2018 11:20)      SUBJECTIVE / OVERNIGHT EVENTS:  Patient seen and examined at bedside. Patient with no acute events overnight, and no complaints this morning.    Other Review of Systems Negative.    MEDICATIONS  (STANDING):  ALBUTerol/ipratropium for Nebulization 3 milliLiter(s) Nebulizer every 6 hours  amLODIPine   Tablet 10 milliGRAM(s) Oral daily  aspirin enteric coated 81 milliGRAM(s) Oral daily  atorvastatin 40 milliGRAM(s) Oral at bedtime  dextrose 5%. 1000 milliLiter(s) (50 mL/Hr) IV Continuous <Continuous>  dextrose 50% Injectable 12.5 Gram(s) IV Push once  dextrose 50% Injectable 25 Gram(s) IV Push once  dextrose 50% Injectable 25 Gram(s) IV Push once  heparin  Injectable 5000 Unit(s) SubCutaneous every 8 hours  insulin glargine Injectable (LANTUS) 9 Unit(s) SubCutaneous at bedtime  insulin lispro (HumaLOG) corrective regimen sliding scale   SubCutaneous three times a day before meals  insulin lispro (HumaLOG) corrective regimen sliding scale   SubCutaneous at bedtime  lactated ringers. 1000 milliLiter(s) (100 mL/Hr) IV Continuous <Continuous>  lisinopril 20 milliGRAM(s) Oral daily  methylphenidate 5 milliGRAM(s) Oral <User Schedule>  sertraline 50 milliGRAM(s) Oral daily    MEDICATIONS  (PRN):  benzocaine 15 mG/menthol 3.6 mG Lozenge 1 Lozenge Oral four times a day PRN Sore Throat  dextrose 40% Gel 15 Gram(s) Oral once PRN Blood Glucose LESS THAN 70 milliGRAM(s)/deciliter  glucagon  Injectable 1 milliGRAM(s) IntraMuscular once PRN Glucose LESS THAN 70 milligrams/deciliter      OBJECTIVE:    Vital Signs Last 24 Hrs  T(C): 37.5 (09 May 2018 21:02), Max: 37.5 (09 May 2018 21:02)  T(F): 99.5 (09 May 2018 21:02), Max: 99.5 (09 May 2018 21:02)  HR: 91 (10 May 2018 07:12) (64 - 91)  BP: 123/57 (09 May 2018 21:02) (123/57 - 140/49)  BP(mean): --  RR: 18 (09 May 2018 21:02) (18 - 18)  SpO2: 98% (10 May 2018 07:12) (90% - 99%)    CAPILLARY BLOOD GLUCOSE      POCT Blood Glucose.: 183 mg/dL (09 May 2018 21:59)  POCT Blood Glucose.: 170 mg/dL (09 May 2018 17:16)  POCT Blood Glucose.: 169 mg/dL (09 May 2018 12:11)  POCT Blood Glucose.: 159 mg/dL (09 May 2018 08:26)    I&O's Summary    09 May 2018 07:01  -  10 May 2018 07:00  --------------------------------------------------------  IN: 500 mL / OUT: 0 mL / NET: 500 mL        PHYSICAL EXAM:  GENERAL: NAD, well-developed  HEAD:  Atraumatic, Normocephalic  EYES: EOMI, PERRLA, conjunctiva and sclera clear  NECK: Supple, No JVD  CHEST/LUNG: Clear to auscultation bilaterally; No wheeze  HEART: Regular rate and rhythm; No murmurs, rubs, or gallops  ABDOMEN: Soft, Nontender, Nondistended; Bowel sounds present  EXTREMITIES:  2+ Peripheral Pulses, No clubbing, cyanosis, or edema  PSYCH: AAOx2  NEUROLOGY: non-focal  SKIN: No rashes or lesions    LABS:                        11.3   15.39 )-----------( 475      ( 10 May 2018 06:00 )             36.5     Auto Eosinophil # x     / Auto Eosinophil % x     / Auto Neutrophil # x     / Auto Neutrophil % x     / BANDS % x                            11.1   14.63 )-----------( 433      ( 09 May 2018 06:15 )             35.0     Auto Eosinophil # 0.07  / Auto Eosinophil % 0.5   / Auto Neutrophil # 9.03  / Auto Neutrophil % 62.2  / BANDS % x        05-10    137  |  97<L>  |  8   ----------------------------<  126<H>  4.4   |  29  |  0.81  05-09    136  |  95<L>  |  9   ----------------------------<  161<H>  3.3<L>   |  26  |  0.74    Ca    9.7      10 May 2018 06:00  Mg     1.7     05-10  Phos  2.6     05-10

## 2018-05-10 NOTE — PROGRESS NOTE BEHAVIORAL HEALTH - NSBHCHARTREVIEWVS_PSY_A_CORE FT
Vital Signs Last 24 Hrs  T(C): 37.1 (10 May 2018 06:05), Max: 37.5 (09 May 2018 21:02)  T(F): 98.8 (10 May 2018 06:05), Max: 99.5 (09 May 2018 21:02)  HR: 89 (10 May 2018 12:07) (64 - 91)  BP: 126/62 (10 May 2018 06:05) (123/57 - 126/62)  BP(mean): --  RR: 18 (10 May 2018 06:05) (18 - 18)  SpO2: 98% (10 May 2018 12:07) (90% - 99%)

## 2018-05-10 NOTE — PROGRESS NOTE ADULT - PROBLEM SELECTOR PLAN 1
Pt with postprandial hyperglycemia today, appetite has been poor in general.  Start low dose prandial humalog with 2 units before meals  Continue lantus 9 units at bedtime  Continue low dose correctional humalog    Elevated hba1c likely due to lack of adherence with medications, plan to discharge on oral agents.

## 2018-05-10 NOTE — PROGRESS NOTE ADULT - SUBJECTIVE AND OBJECTIVE BOX
Diabetes  Follow up note    Interval History: Feeling better today. A little weak, made her bed this morning.   Reports good appetite, no nausea.    MEDICATIONS  (STANDING):  ALBUTerol/ipratropium for Nebulization 3 milliLiter(s) Nebulizer every 6 hours  amLODIPine   Tablet 10 milliGRAM(s) Oral daily  aspirin enteric coated 81 milliGRAM(s) Oral daily  atorvastatin 40 milliGRAM(s) Oral at bedtime  dextrose 5%. 1000 milliLiter(s) (50 mL/Hr) IV Continuous <Continuous>  dextrose 50% Injectable 12.5 Gram(s) IV Push once  dextrose 50% Injectable 25 Gram(s) IV Push once  dextrose 50% Injectable 25 Gram(s) IV Push once  heparin  Injectable 5000 Unit(s) SubCutaneous every 8 hours  insulin glargine Injectable (LANTUS) 9 Unit(s) SubCutaneous at bedtime  insulin lispro (HumaLOG) corrective regimen sliding scale   SubCutaneous three times a day before meals  insulin lispro (HumaLOG) corrective regimen sliding scale   SubCutaneous at bedtime  lactated ringers. 1000 milliLiter(s) (100 mL/Hr) IV Continuous <Continuous>  lisinopril 20 milliGRAM(s) Oral daily  methylphenidate 5 milliGRAM(s) Oral <User Schedule>  sertraline 50 milliGRAM(s) Oral daily    MEDICATIONS  (PRN):  benzocaine 15 mG/menthol 3.6 mG Lozenge 1 Lozenge Oral four times a day PRN Sore Throat  dextrose 40% Gel 15 Gram(s) Oral once PRN Blood Glucose LESS THAN 70 milliGRAM(s)/deciliter  glucagon  Injectable 1 milliGRAM(s) IntraMuscular once PRN Glucose LESS THAN 70 milligrams/deciliter      Allergies    No Known Allergies    PHYSICAL EXAM:  VITALS: T(C): 37.1 (05-10-18 @ 06:05)  T(F): 98.8 (05-10-18 @ 06:05), Max: 99.5 (05-09-18 @ 21:02)  HR: 89 (05-10-18 @ 12:07) (64 - 91)  BP: 126/62 (05-10-18 @ 06:05) (123/57 - 140/49)  RR:  (18 - 18)  SpO2:  (90% - 99%)  GENERAL: Sitting up in chair in NAD,   EYES: No proptosis,  HEENT:  Atraumatic, Normocephalic,   RESPIRATORY: Clear to auscultation bilaterally  CARDIOVASCULAR: Regular rhythm; No murmurs; no peripheral edema  GI: Soft, nontender, non distended, normal bowel sounds        POCT Blood Glucose.: 238 mg/dL (05-10-18 @ 12:11)  POCT Blood Glucose.: 131 mg/dL (05-10-18 @ 08:14)  POCT Blood Glucose.: 183 mg/dL (05-09-18 @ 21:59)  POCT Blood Glucose.: 170 mg/dL (05-09-18 @ 17:16)  POCT Blood Glucose.: 169 mg/dL (05-09-18 @ 12:11)  POCT Blood Glucose.: 159 mg/dL (05-09-18 @ 08:26)  POCT Blood Glucose.: 197 mg/dL (05-08-18 @ 22:47)  POCT Blood Glucose.: 213 mg/dL (05-08-18 @ 17:06)  POCT Blood Glucose.: 180 mg/dL (05-08-18 @ 11:55)  POCT Blood Glucose.: 233 mg/dL (05-07-18 @ 23:01)        05-10    137  |  97<L>  |  8   ----------------------------<  126<H>  4.4   |  29  |  0.81    EGFR if : 82  EGFR if non : 71    Ca    9.7      05-10  Mg     1.7     05-10  Phos  2.6     05-10    TPro  8.1  /  Alb  3.4  /  TBili  0.7  /  DBili  x   /  AST  12  /  ALT  12  /  AlkPhos  116  05-08        Hemoglobin A1C, Whole Blood: 11.0 % <H> [4.0 - 5.6] (05-09-18 @ 06:15)

## 2018-05-10 NOTE — PROGRESS NOTE ADULT - PROBLEM SELECTOR PLAN 3
- HgA1c= 11 (increased from 4 in february)- likely in setting of medication non compliance  - Endo recs appreciated- start lantus at 9 mg with ISS before meals for now as patient with poor PO intake

## 2018-05-10 NOTE — PROGRESS NOTE BEHAVIORAL HEALTH - NSBHCHARTREVIEWLAB_PSY_A_CORE FT
05-10    137  |  97<L>  |  8   ----------------------------<  126<H>  4.4   |  29  |  0.81    Ca    9.7      10 May 2018 06:00  Phos  2.6     05-10  Mg     1.7     05-10    CBC Full  -  ( 10 May 2018 06:00 )  WBC Count : 15.39 K/uL  Hemoglobin : 11.3 g/dL  Hematocrit : 36.5 %  Platelet Count - Automated : 475 K/uL  Mean Cell Volume : 80.6 fL  Mean Cell Hemoglobin : 24.9 pg  Mean Cell Hemoglobin Concentration : 31.0 %

## 2018-05-11 ENCOUNTER — TRANSCRIPTION ENCOUNTER (OUTPATIENT)
Age: 78
End: 2018-05-11

## 2018-05-11 VITALS — HEART RATE: 80 BPM | OXYGEN SATURATION: 98 %

## 2018-05-11 DIAGNOSIS — E11.65 TYPE 2 DIABETES MELLITUS WITH HYPERGLYCEMIA: ICD-10-CM

## 2018-05-11 LAB
BUN SERPL-MCNC: 6 MG/DL — LOW (ref 7–23)
CALCIUM SERPL-MCNC: 9.5 MG/DL — SIGNIFICANT CHANGE UP (ref 8.4–10.5)
CHLORIDE SERPL-SCNC: 99 MMOL/L — SIGNIFICANT CHANGE UP (ref 98–107)
CO2 SERPL-SCNC: 26 MMOL/L — SIGNIFICANT CHANGE UP (ref 22–31)
CREAT SERPL-MCNC: 0.75 MG/DL — SIGNIFICANT CHANGE UP (ref 0.5–1.3)
GLUCOSE SERPL-MCNC: 104 MG/DL — HIGH (ref 70–99)
HCT VFR BLD CALC: 37.1 % — SIGNIFICANT CHANGE UP (ref 34.5–45)
HGB BLD-MCNC: 11.6 G/DL — SIGNIFICANT CHANGE UP (ref 11.5–15.5)
MAGNESIUM SERPL-MCNC: 1.7 MG/DL — SIGNIFICANT CHANGE UP (ref 1.6–2.6)
MCHC RBC-ENTMCNC: 25 PG — LOW (ref 27–34)
MCHC RBC-ENTMCNC: 31.3 % — LOW (ref 32–36)
MCV RBC AUTO: 80 FL — SIGNIFICANT CHANGE UP (ref 80–100)
NRBC # FLD: 0 — SIGNIFICANT CHANGE UP
PHOSPHATE SERPL-MCNC: 3.7 MG/DL — SIGNIFICANT CHANGE UP (ref 2.5–4.5)
PLATELET # BLD AUTO: 462 K/UL — HIGH (ref 150–400)
PMV BLD: 10.6 FL — SIGNIFICANT CHANGE UP (ref 7–13)
POTASSIUM SERPL-MCNC: 4.1 MMOL/L — SIGNIFICANT CHANGE UP (ref 3.5–5.3)
POTASSIUM SERPL-SCNC: 4.1 MMOL/L — SIGNIFICANT CHANGE UP (ref 3.5–5.3)
RBC # BLD: 4.64 M/UL — SIGNIFICANT CHANGE UP (ref 3.8–5.2)
RBC # FLD: 13.2 % — SIGNIFICANT CHANGE UP (ref 10.3–14.5)
SODIUM SERPL-SCNC: 135 MMOL/L — SIGNIFICANT CHANGE UP (ref 135–145)
WBC # BLD: 13.47 K/UL — HIGH (ref 3.8–10.5)
WBC # FLD AUTO: 13.47 K/UL — HIGH (ref 3.8–10.5)

## 2018-05-11 PROCEDURE — 99232 SBSQ HOSP IP/OBS MODERATE 35: CPT | Mod: GC

## 2018-05-11 PROCEDURE — 99239 HOSP IP/OBS DSCHRG MGMT >30: CPT

## 2018-05-11 RX ORDER — SERTRALINE 25 MG/1
1 TABLET, FILM COATED ORAL
Qty: 0 | Refills: 0 | COMMUNITY

## 2018-05-11 RX ORDER — METHYLPHENIDATE HCL 5 MG
1 TABLET ORAL
Qty: 60 | Refills: 0
Start: 2018-05-11 | End: 2018-06-09

## 2018-05-11 RX ORDER — SERTRALINE 25 MG/1
1 TABLET, FILM COATED ORAL
Qty: 0 | Refills: 0 | DISCHARGE
Start: 2018-05-11

## 2018-05-11 RX ORDER — BENZOCAINE AND MENTHOL 5; 1 G/100ML; G/100ML
1 LIQUID ORAL
Qty: 21 | Refills: 0
Start: 2018-05-11 | End: 2018-05-17

## 2018-05-11 RX ORDER — ISOPROPYL ALCOHOL, BENZOCAINE .7; .06 ML/ML; ML/ML
1 SWAB TOPICAL
Qty: 90 | Refills: 0
Start: 2018-05-11 | End: 2018-06-09

## 2018-05-11 RX ADMIN — HEPARIN SODIUM 5000 UNIT(S): 5000 INJECTION INTRAVENOUS; SUBCUTANEOUS at 06:16

## 2018-05-11 RX ADMIN — Medication 5 MILLIGRAM(S): at 08:57

## 2018-05-11 RX ADMIN — LISINOPRIL 20 MILLIGRAM(S): 2.5 TABLET ORAL at 06:15

## 2018-05-11 RX ADMIN — Medication 2 UNIT(S): at 08:55

## 2018-05-11 RX ADMIN — SERTRALINE 50 MILLIGRAM(S): 25 TABLET, FILM COATED ORAL at 12:37

## 2018-05-11 RX ADMIN — Medication 2 UNIT(S): at 12:37

## 2018-05-11 RX ADMIN — Medication 3 MILLILITER(S): at 15:35

## 2018-05-11 RX ADMIN — Medication 5 MILLIGRAM(S): at 12:37

## 2018-05-11 RX ADMIN — AMLODIPINE BESYLATE 10 MILLIGRAM(S): 2.5 TABLET ORAL at 06:15

## 2018-05-11 RX ADMIN — Medication 81 MILLIGRAM(S): at 12:37

## 2018-05-11 RX ADMIN — Medication 3 MILLILITER(S): at 09:54

## 2018-05-11 RX ADMIN — Medication 3 MILLILITER(S): at 03:39

## 2018-05-11 NOTE — PROGRESS NOTE ADULT - PROBLEM SELECTOR PLAN 8
- continue home medications lisinopril and amlodipine
- continue home medications lisinopril and amlodipine
- heparin subq  - Diet- soft

## 2018-05-11 NOTE — DISCHARGE NOTE ADULT - CARE PROVIDER_API CALL
lAyssa Sosa  [facility]  [address] [place] [address]  Phone: (   )    -  Fax: (   )    -    Rancho Jennings (MAURICIO), Psychiatry; Psychosomatic Medicine  [address] [place] [address]  Phone: [phone]  Fax: [phone]

## 2018-05-11 NOTE — PROGRESS NOTE ADULT - PROBLEM SELECTOR PROBLEM 1
Uncontrolled type 2 diabetes mellitus without complication, without long-term current use of insulin

## 2018-05-11 NOTE — DISCHARGE NOTE ADULT - HOSPITAL COURSE
77F with PMHx of DM, HTN, HLD, EDUAR, depression and B12 deficiency  is brought in by her daughter for lack of self care.    Daughter reports that patient is unable to take of herself, stays in bed all day and is unwilling to eat. She also reports patient has not been taking any over medications either. Daughter states patient has been like this and worsening over the past few months. In addition, daughter reports patient has been coughing with sputum production, and states that everyone in the household is having a "head cold." Daughter states it is too much to take care of her like this.    Patient reports that daughter brought her in because she has been feeling tired and has had difficulty with swallowing. She states this has been worsening over the past few weeks. Patient states she has also lost her appetite and has difficulty swallowing, which has led to her not taking her medications because "she can't take her meds on an empty stomach." Pt also states she has some pain in her belly, along with nausea and when she tries to eat solid foods, she vomits. Daughter does not endorse any vomiting. Patient also reports that she has been having a bad cough for the past few days with marked amount of sputum production.     Of note, patient also endorses some lightheadedness when she gets out of bed. She was admitted twice with episodes of syncope (last admission in February), thought to be secondary to vasovagal syncope.    Patient admitted to the hospital for cough and evaluation of her fatigue. Patient had leukocytosis on admission, however was not febrile. Her RVP was negative and it was deemed patient likely had an upper respiratory infection. She improved markedly with duonebs treatment. Upon further evaluation, daughter reported patient has been apathetic lately, with poor oral intake, prolonged bed-rest, using diapers in order to remain in bed without having to get up to use the bathroom, not showering and complaining of fatigue. She also was exhibiting some bizarre behavior at home, one time found being surrounded by her feces. Psychiatry was consulted to evaluate patient who determined she likely has a neurocognitive disorder, and may have some underlying dementia. 77F with PMHx of DM, HTN, HLD, EDUAR, depression and B12 deficiency  is brought in by her daughter for lack of self care.    Daughter reports that patient is unable to take of herself, stays in bed all day and is unwilling to eat. She also reports patient has not been taking any over medications either. Daughter states patient has been like this and worsening over the past few months. In addition, daughter reports patient has been coughing with sputum production, and states that everyone in the household is having a "head cold." Daughter states it is too much to take care of her like this.    Patient reports that daughter brought her in because she has been feeling tired and has had difficulty with swallowing. She states this has been worsening over the past few weeks. Patient states she has also lost her appetite and has difficulty swallowing, which has led to her not taking her medications because "she can't take her meds on an empty stomach." Pt also states she has some pain in her belly, along with nausea and when she tries to eat solid foods, she vomits. Daughter does not endorse any vomiting. Patient also reports that she has been having a bad cough for the past few days with marked amount of sputum production.     Of note, patient also endorses some lightheadedness when she gets out of bed. She was admitted twice with episodes of syncope (last admission in February), thought to be secondary to vasovagal syncope.    Patient admitted to the hospital for cough and evaluation of her fatigue. Patient had leukocytosis on admission, however was not febrile. Her RVP was negative and it was deemed patient likely had an upper respiratory infection. She improved markedly with duonebs treatment. Upon further evaluation, daughter reported patient has been apathetic lately, with poor oral intake, prolonged bed-rest, using diapers in order to remain in bed without having to get up to use the bathroom, not showering and complaining of fatigue. She also was exhibiting some bizarre behavior at home, one time found being surrounded by her feces. Psychiatry was consulted to evaluate patient who determined she likely has a neurocognitive disorder, and may have some underlying dementia. Patient started on low dose ritalin to stimulate mood and behavior to encourage patient to be more active. Patient was found to be walking more, eating better and spending more time out of bed after initiation of the treatment. CT was also done to rule out any organic pathology and it was within normal limits.     Patient also has history of EDUAR and is non compliant with CPAP. A component of her fatigue is like secondary to the non compliance and patient was encouraged to use as much as tolerated as it will help her symptoms. In addition, she was found to have a very elevated A1c (11) which had markedly sandra since february, likely in setting of noncompliance with home hypoglycemics. During the hospitalization she was managed on insulin, but will be restarted on her home medications upon discharge.    Currently, patient has clinically improved and is HD stable for discharge. She should follow up with her primary care doctor within 1 week of discharge and psychiatrist within 2-4 weeks of discharge. 77F with PMHx of DM, HTN, HLD, EDUAR, depression and B12 deficiency  is brought in by her daughter for lack of self care.    Daughter reports that patient is unable to take of herself, stays in bed all day and is unwilling to eat. She also reports patient has not been taking any over medications either. Daughter states patient has been like this and worsening over the past few months. In addition, daughter reports patient has been coughing with sputum production, and states that everyone in the household is having a "head cold." Daughter states it is too much to take care of her like this.    Patient reports that daughter brought her in because she has been feeling tired and has had difficulty with swallowing. She states this has been worsening over the past few weeks. Patient states she has also lost her appetite and has difficulty swallowing, which has led to her not taking her medications because "she can't take her meds on an empty stomach." Pt also states she has some pain in her belly, along with nausea and when she tries to eat solid foods, she vomits. Daughter does not endorse any vomiting. Patient also reports that she has been having a bad cough for the past few days with marked amount of sputum production.     Of note, patient also endorses some lightheadedness when she gets out of bed. She was admitted twice with episodes of syncope (last admission in February), thought to be secondary to vasovagal syncope.    Patient admitted to the hospital for cough and evaluation of her fatigue. Patient had leukocytosis on admission, however was not febrile. Her RVP was negative but given clinical picture was felt pt had an upper respiratory infection. She improved markedly with duonebs treatment. Upon further evaluation, daughter reported patient has been apathetic lately, with poor oral intake, prolonged bed-rest, using diapers in order to remain in bed without having to get up to use the bathroom, not showering and complaining of fatigue. She also was exhibiting some bizarre behavior at home, one time found being surrounded by her feces. Psychiatry was consulted to evaluate patient who determined she likely has a neurocognitive disorder, and may have some underlying dementia. Patient started on low dose ritalin to stimulate mood and behavior to encourage patient to be more active. Patient was found to be walking more, eating better and spending more time out of bed after initiation of the treatment. CT was also done to rule out any organic pathology and it was within normal limits.     Patient also has history of EDUAR and is non compliant with CPAP. A component of her fatigue is like secondary to the non compliance and patient was encouraged to use as much as tolerated as it will help her symptoms. In addition, she was found to have a very elevated A1c (11) which had markedly sandra since february, likely in setting of noncompliance with home hypoglycemics. During the hospitalization she was managed on insulin, but will be restarted on her home medications upon discharge.    Currently, patient has clinically improved and is HD stable for discharge. She should follow up with her primary care doctor within 1 week of discharge and psychiatrist within 2-4 weeks of discharge.

## 2018-05-11 NOTE — PROGRESS NOTE ADULT - ASSESSMENT
77 year old woman with T2DM, recently uncontrolled, admitted with lack of self care, fatigue. Now BG is well controlled inpatient.

## 2018-05-11 NOTE — DISCHARGE NOTE ADULT - CARE PLAN
Principal Discharge DX:	Cough with sputum  Secondary Diagnosis:	Neurocognitive disorder  Secondary Diagnosis:	Type 2 diabetes mellitus  Secondary Diagnosis:	Hypertension  Secondary Diagnosis:	Depression (emotion) Principal Discharge DX:	Cough with sputum  Goal:	Improvement  Assessment and plan of treatment:	Patient admitted with cough and sputum production, and wheezing, likely due to a viral illness. She improved with nebulizer treatment and supportive care. Continue to use the tessalon pearls as needed for cough.  Secondary Diagnosis:	Neurocognitive disorder  Goal:	Ongoing care  Assessment and plan of treatment:	Patient should continue to take the ritalin as prescribed, and follow up outpatient with a psychiatrist.  Secondary Diagnosis:	Type 2 diabetes mellitus  Goal:	Ongoing care  Assessment and plan of treatment:	It is imperative that patient takes her diabetic medications. Should she not, her A1c level will continue to rise and patient may require long term insulin treatment.  Secondary Diagnosis:	Hypertension  Goal:	Ongoing care  Assessment and plan of treatment:	Please continue to take home medications.  Secondary Diagnosis:	Depression (emotion)  Goal:	Ongoing care  Assessment and plan of treatment:	Please continue to take your sertraline.

## 2018-05-11 NOTE — PROGRESS NOTE ADULT - SUBJECTIVE AND OBJECTIVE BOX
History:  No acute overnight events. She feels well and denied any complaints. Reported a good appetite. BG levels inpatient have remained within control.       MEDICATIONS  (STANDING):  ALBUTerol/ipratropium for Nebulization 3 milliLiter(s) Nebulizer every 6 hours  amLODIPine   Tablet 10 milliGRAM(s) Oral daily  aspirin enteric coated 81 milliGRAM(s) Oral daily  atorvastatin 40 milliGRAM(s) Oral at bedtime  dextrose 5%. 1000 milliLiter(s) (50 mL/Hr) IV Continuous <Continuous>  dextrose 50% Injectable 12.5 Gram(s) IV Push once  dextrose 50% Injectable 25 Gram(s) IV Push once  dextrose 50% Injectable 25 Gram(s) IV Push once  heparin  Injectable 5000 Unit(s) SubCutaneous every 8 hours  insulin glargine Injectable (LANTUS) 9 Unit(s) SubCutaneous at bedtime  insulin lispro (HumaLOG) corrective regimen sliding scale   SubCutaneous three times a day before meals  insulin lispro (HumaLOG) corrective regimen sliding scale   SubCutaneous at bedtime  insulin lispro Injectable (HumaLOG) 2 Unit(s) SubCutaneous three times a day before meals  lactated ringers. 1000 milliLiter(s) (100 mL/Hr) IV Continuous <Continuous>  lisinopril 20 milliGRAM(s) Oral daily  methylphenidate 5 milliGRAM(s) Oral <User Schedule>  sertraline 50 milliGRAM(s) Oral daily    MEDICATIONS  (PRN):  benzocaine 15 mG/menthol 3.6 mG Lozenge 1 Lozenge Oral four times a day PRN Sore Throat  benzonatate 100 milliGRAM(s) Oral three times a day PRN Cough  dextrose 40% Gel 15 Gram(s) Oral once PRN Blood Glucose LESS THAN 70 milliGRAM(s)/deciliter  glucagon  Injectable 1 milliGRAM(s) IntraMuscular once PRN Glucose LESS THAN 70 milligrams/deciliter      Allergies  No Known Allergies      Review of Systems:  ---------------------  Constitutional: No fever  Eyes: No blurry vision  Neuro: No tremors  HEENT: No pain  Cardiovascular: No chest pain, palpitations  Respiratory: No SOB, no cough  GI: No nausea, vomiting, abdominal pain  Skin: no rash  Psych: no depression  Endocrine: no polyuria, polydipsia      PHYSICAL EXAM:  VITALS: T(C): 36.7 (05-11-18 @ 12:40)  T(F): 98 (05-11-18 @ 12:40), Max: 98.1 (05-10-18 @ 15:08)  HR: 65 (05-11-18 @ 12:40) (62 - 102)  BP: 101/56 (05-11-18 @ 12:40) (101/56 - 132/61)  RR:  (17 - 18)  SpO2:  (96% - 100%)    GENERAL: NAD, pleasant elderly female   EYES: No proptosis, no lid lag, anicteric  HEENT:  Atraumatic, Normocephalic, moist mucous membranes  THYROID: Normal size, no palpable nodules  RESPIRATORY: Clear to auscultation bilaterally; No rales, rhonchi, wheezing, or rubs  CARDIOVASCULAR: Regular rate and rhythm; No murmurs; no peripheral edema  GI: Soft, nontender, non distended, normal bowel sounds  SKIN: Dry, intact, No rashes or lesions  MUSCULOSKELETAL: Full range of motion, normal strength  NEURO: sensation intact, extraocular movements intact, no tremor  PSYCH: Alert and oriented x 3, normal affect, normal mood      POCT Blood Glucose.: 143 mg/dL (05-11-18 @ 12:06)  POCT Blood Glucose.: 109 mg/dL (05-11-18 @ 08:12)  POCT Blood Glucose.: 102 mg/dL (05-10-18 @ 22:19)  POCT Blood Glucose.: 187 mg/dL (05-10-18 @ 17:33)  POCT Blood Glucose.: 238 mg/dL (05-10-18 @ 12:11)  POCT Blood Glucose.: 131 mg/dL (05-10-18 @ 08:14)  POCT Blood Glucose.: 183 mg/dL (05-09-18 @ 21:59)  POCT Blood Glucose.: 170 mg/dL (05-09-18 @ 17:16)  POCT Blood Glucose.: 169 mg/dL (05-09-18 @ 12:11)  POCT Blood Glucose.: 159 mg/dL (05-09-18 @ 08:26)  POCT Blood Glucose.: 197 mg/dL (05-08-18 @ 22:47)  POCT Blood Glucose.: 213 mg/dL (05-08-18 @ 17:06)      05-11    135  |  99  |  6<L>  ----------------------------<  104<H>  4.1   |  26  |  0.75    EGFR if : 90  EGFR if non : 78    Ca    9.5      05-11  Mg     1.7     05-11  Phos  3.7     05-11            Thyroid Function Tests:  05-08 @ 02:00 TSH 0.62 FreeT4 -- T3 -- Anti TPO -- Anti Thyroglobulin Ab -- TSI --      Hemoglobin A1C, Whole Blood: 11.0 % <H> [4.0 - 5.6] (05-09-18 @ 06:15)

## 2018-05-11 NOTE — DISCHARGE NOTE ADULT - PROVIDER TOKENS
FREE:[LAST:[Sosa],FIRST:[Alyssa],PHONE:[(   )    -],FAX:[(   )    -],ADDRESS:[[address]],TOKEN:'[id number]'

## 2018-05-11 NOTE — PROGRESS NOTE ADULT - SUBJECTIVE AND OBJECTIVE BOX
Karen Cesario PGY 1  Pager: [phone]/ [phone]    Patient is a 77y old  Female who presents with a chief complaint of fatigue (08 May 2018 11:20)      SUBJECTIVE / OVERNIGHT EVENTS:  Patient seen and examined at bedside. Patient reports no acute events overnight. She felt good throughout the day yesterday and slept well overnight. She was able to tolerate CPAP only for a few hours because she    Other Review of Systems Negative.    MEDICATIONS  (STANDING):  ALBUTerol/ipratropium for Nebulization 3 milliLiter(s) Nebulizer every 6 hours  amLODIPine   Tablet 10 milliGRAM(s) Oral daily  aspirin enteric coated 81 milliGRAM(s) Oral daily  atorvastatin 40 milliGRAM(s) Oral at bedtime  dextrose 5%. 1000 milliLiter(s) (50 mL/Hr) IV Continuous <Continuous>  dextrose 50% Injectable 12.5 Gram(s) IV Push once  dextrose 50% Injectable 25 Gram(s) IV Push once  dextrose 50% Injectable 25 Gram(s) IV Push once  heparin  Injectable 5000 Unit(s) SubCutaneous every 8 hours  insulin glargine Injectable (LANTUS) 9 Unit(s) SubCutaneous at bedtime  insulin lispro (HumaLOG) corrective regimen sliding scale   SubCutaneous three times a day before meals  insulin lispro (HumaLOG) corrective regimen sliding scale   SubCutaneous at bedtime  insulin lispro Injectable (HumaLOG) 2 Unit(s) SubCutaneous three times a day before meals  lactated ringers. 1000 milliLiter(s) (100 mL/Hr) IV Continuous <Continuous>  lisinopril 20 milliGRAM(s) Oral daily  methylphenidate 5 milliGRAM(s) Oral <User Schedule>  sertraline 50 milliGRAM(s) Oral daily    MEDICATIONS  (PRN):  benzocaine 15 mG/menthol 3.6 mG Lozenge 1 Lozenge Oral four times a day PRN Sore Throat  dextrose 40% Gel 15 Gram(s) Oral once PRN Blood Glucose LESS THAN 70 milliGRAM(s)/deciliter  glucagon  Injectable 1 milliGRAM(s) IntraMuscular once PRN Glucose LESS THAN 70 milligrams/deciliter      OBJECTIVE:    Vital Signs Last 24 Hrs  T(C): 36.6 (11 May 2018 06:21), Max: 36.7 (10 May 2018 15:08)  T(F): 97.8 (11 May 2018 06:21), Max: 98.1 (10 May 2018 15:08)  HR: 62 (11 May 2018 07:37) (62 - 102)  BP: 132/61 (11 May 2018 06:21) (119/61 - 132/61)  BP(mean): --  RR: 18 (11 May 2018 06:21) (17 - 18)  SpO2: 98% (11 May 2018 07:37) (97% - 100%)    CAPILLARY BLOOD GLUCOSE      POCT Blood Glucose.: 102 mg/dL (10 May 2018 22:19)  POCT Blood Glucose.: 187 mg/dL (10 May 2018 17:33)  POCT Blood Glucose.: 238 mg/dL (10 May 2018 12:11)  POCT Blood Glucose.: 131 mg/dL (10 May 2018 08:14)    I&O's Summary      PHYSICAL EXAM:  GENERAL: NAD, well-developed  HEAD:  Atraumatic, Normocephalic  EYES: EOMI, PERRLA, conjunctiva and sclera clear  NECK: Supple, No JVD  CHEST/LUNG: Clear to auscultation bilaterally; No wheeze  HEART: Regular rate and rhythm; No murmurs, rubs, or gallops  ABDOMEN: Soft, Nontender, Nondistended; Bowel sounds present  EXTREMITIES:  2+ Peripheral Pulses, No clubbing, cyanosis, or edema  PSYCH: AAOx3  NEUROLOGY: non-focal  SKIN: No rashes or lesions    LABS:                        11.6   13.47 )-----------( 462      ( 11 May 2018 05:51 )             37.1     Auto Eosinophil # x     / Auto Eosinophil % x     / Auto Neutrophil # x     / Auto Neutrophil % x     / BANDS % x                            11.3   15.39 )-----------( 475      ( 10 May 2018 06:00 )             36.5     Auto Eosinophil # x     / Auto Eosinophil % x     / Auto Neutrophil # x     / Auto Neutrophil % x     / BANDS % x        05-11    135  |  99  |  6<L>  ----------------------------<  104<H>  4.1   |  26  |  0.75  05-10    137  |  97<L>  |  8   ----------------------------<  126<H>  4.4   |  29  |  0.81    Ca    9.5      11 May 2018 05:51  Mg     1.7     05-11  Phos  3.7     05-11                  ABG:     VBG:       Care Discussed with Consultants/Other Providers:    RADIOLOGY & ADDITIONAL TESTS:  (Imaging Personally Reviewed) Karen Cesario PGY 1  Pager: [phone]/ [phone]    Patient is a 77y old  Female who presents with a chief complaint of fatigue (08 May 2018 11:20)      SUBJECTIVE / OVERNIGHT EVENTS:  Patient seen and examined at bedside. Patient reports no acute events overnight. She felt good throughout the day yesterday and slept well overnight. She was able to tolerate CPAP only for a few hours because she started to develop coughing.    Other Review of Systems Negative.    MEDICATIONS  (STANDING):  ALBUTerol/ipratropium for Nebulization 3 milliLiter(s) Nebulizer every 6 hours  amLODIPine   Tablet 10 milliGRAM(s) Oral daily  aspirin enteric coated 81 milliGRAM(s) Oral daily  atorvastatin 40 milliGRAM(s) Oral at bedtime  dextrose 5%. 1000 milliLiter(s) (50 mL/Hr) IV Continuous <Continuous>  dextrose 50% Injectable 12.5 Gram(s) IV Push once  dextrose 50% Injectable 25 Gram(s) IV Push once  dextrose 50% Injectable 25 Gram(s) IV Push once  heparin  Injectable 5000 Unit(s) SubCutaneous every 8 hours  insulin glargine Injectable (LANTUS) 9 Unit(s) SubCutaneous at bedtime  insulin lispro (HumaLOG) corrective regimen sliding scale   SubCutaneous three times a day before meals  insulin lispro (HumaLOG) corrective regimen sliding scale   SubCutaneous at bedtime  insulin lispro Injectable (HumaLOG) 2 Unit(s) SubCutaneous three times a day before meals  lactated ringers. 1000 milliLiter(s) (100 mL/Hr) IV Continuous <Continuous>  lisinopril 20 milliGRAM(s) Oral daily  methylphenidate 5 milliGRAM(s) Oral <User Schedule>  sertraline 50 milliGRAM(s) Oral daily    MEDICATIONS  (PRN):  benzocaine 15 mG/menthol 3.6 mG Lozenge 1 Lozenge Oral four times a day PRN Sore Throat  dextrose 40% Gel 15 Gram(s) Oral once PRN Blood Glucose LESS THAN 70 milliGRAM(s)/deciliter  glucagon  Injectable 1 milliGRAM(s) IntraMuscular once PRN Glucose LESS THAN 70 milligrams/deciliter      OBJECTIVE:    Vital Signs Last 24 Hrs  T(C): 36.6 (11 May 2018 06:21), Max: 36.7 (10 May 2018 15:08)  T(F): 97.8 (11 May 2018 06:21), Max: 98.1 (10 May 2018 15:08)  HR: 62 (11 May 2018 07:37) (62 - 102)  BP: 132/61 (11 May 2018 06:21) (119/61 - 132/61)  BP(mean): --  RR: 18 (11 May 2018 06:21) (17 - 18)  SpO2: 98% (11 May 2018 07:37) (97% - 100%)    CAPILLARY BLOOD GLUCOSE      POCT Blood Glucose.: 102 mg/dL (10 May 2018 22:19)  POCT Blood Glucose.: 187 mg/dL (10 May 2018 17:33)  POCT Blood Glucose.: 238 mg/dL (10 May 2018 12:11)  POCT Blood Glucose.: 131 mg/dL (10 May 2018 08:14)    I&O's Summary      PHYSICAL EXAM:  GENERAL: NAD, well-developed  HEAD:  Atraumatic, Normocephalic  EYES: EOMI, PERRLA, conjunctiva and sclera clear  NECK: Supple, No JVD  CHEST/LUNG: Clear to auscultation bilaterally; No wheeze  HEART: Regular rate and rhythm; No murmurs, rubs, or gallops  ABDOMEN: Soft, Nontender, Nondistended; Bowel sounds present  EXTREMITIES:  2+ Peripheral Pulses, No clubbing, cyanosis, or edema  PSYCH: AAOx3  NEUROLOGY: non-focal  SKIN: No rashes or lesions    LABS:                        11.6   13.47 )-----------( 462      ( 11 May 2018 05:51 )             37.1     Auto Eosinophil # x     / Auto Eosinophil % x     / Auto Neutrophil # x     / Auto Neutrophil % x     / BANDS % x                            11.3   15.39 )-----------( 475      ( 10 May 2018 06:00 )             36.5     Auto Eosinophil # x     / Auto Eosinophil % x     / Auto Neutrophil # x     / Auto Neutrophil % x     / BANDS % x        05-11    135  |  99  |  6<L>  ----------------------------<  104<H>  4.1   |  26  |  0.75  05-10    137  |  97<L>  |  8   ----------------------------<  126<H>  4.4   |  29  |  0.81    Ca    9.5      11 May 2018 05:51  Mg     1.7     05-11  Phos  3.7     05-11

## 2018-05-11 NOTE — PROGRESS NOTE ADULT - PROBLEM SELECTOR PLAN 5
- patient presenting with cough and sputum production, with multiple sick contacts  - likely viral URI with CT showing signs of sinusitis  - improvement in cough and sore throat- continues to have sputum production  - afebrile at this time- monitor off abx

## 2018-05-11 NOTE — DISCHARGE NOTE ADULT - MEDICATION SUMMARY - MEDICATIONS TO TAKE
I will START or STAY ON the medications listed below when I get home from the hospital:    Glucose Test Strips  -- Test blood sugars 3 times a day  -- Indication: For Diabetes    lancet  -- Test blood sugars 3 times a day  -- Indication: For Diabetes    Alcohol swabs  -- Test blood sugars 3 times/day  -- Indication: For Diabetes    Aspir 81 oral delayed release tablet  -- 1 tab(s) by mouth once a day  -- Indication: For Diabetes    lisinopril 20 mg oral tablet  -- 1 tab(s) by mouth once a day  -- Indication: For Hypertension    sertraline 50 mg oral tablet  -- 1 tab(s) by mouth once a day  -- Indication: For Depression (emotion)    Janumet 50 mg-1000 mg oral tablet  -- 1 tab(s) by mouth 2 times a day  -- Indication: For Diabetes    pioglitazone 15 mg oral tablet  -- 1 tab(s) by mouth once a day  -- Indication: For Diabetes    atorvastatin 40 mg oral tablet  -- 1 tab(s) by mouth once a day (at bedtime)  -- Indication: For Hyperlipidemia    benzonatate 100 mg oral capsule  -- 1 cap(s) by mouth 3 times a day, As needed, Cough  -- Indication: For Cough with sputum    alendronate 70 mg oral tablet  -- 1 tab(s) by mouth once a week  -- Indication: For OSteoporosis    amLODIPine 10 mg oral tablet  -- 1 tab(s) by mouth once a day  -- Indication: For Hypertension    methylphenidate 5 mg oral tablet  -- 1 tab(s) by mouth 2 times a day at 8:00AM and 12:00PM MDD:MDD 10  -- Indication: For Neurocognitive disorder    benzocaine-menthol 15 mg-3.6 mg mucous membrane lozenge  -- 1 tab(s) 3 imes a day as needed for sore throat  -- Indication: For Cough with sputum

## 2018-05-11 NOTE — DISCHARGE NOTE ADULT - ADDITIONAL INSTRUCTIONS
1. Please follow up with your PCP within 1 week of discharge (Dr. Sosa)  2. Please follow up with a psychiatrist within 2-4 weeks of discharge. You can see someone within [facility] or your can follow with our clinic here.

## 2018-05-11 NOTE — DISCHARGE NOTE ADULT - CONDITIONS AT DISCHARGE
patient is discharged alert , confused, appetite is much improved , she now eats 75% of her Meals.  She is [place] but the Right Ear is best to speak into.  Skin remains intact; all vital signs are stable and Discharge Instructions are provided.  Lantus Educations was provided to  her daughter and the Patient.

## 2018-05-11 NOTE — PROGRESS NOTE ADULT - PROBLEM SELECTOR PLAN 3
- HgA1c= 11 (increased from 4 in february)- likely in setting of medication non compliance  - Endo recs appreciated- start lantus at 9 mg and 2 units humalog pre-meals  - will likely d/c with oral hypoglycemics- follow up endo recs

## 2018-05-11 NOTE — DISCHARGE NOTE ADULT - PATIENT PORTAL LINK FT
You can access the [facility] Patient Portal, offered by [facility], by registering with the following website: [email or web address]

## 2018-05-11 NOTE — DISCHARGE NOTE ADULT - HOME CARE AGENCY
[facility] AT [place] [phone].  Nurse to visit on 05/12/18. nurse to call prior to visit to arrange. physical Therapy to follow.

## 2018-05-11 NOTE — PROGRESS NOTE ADULT - PROBLEM SELECTOR PLAN 1
- reported history of poor oral intake, prolonged bed-rest, using diapers in order to remain in bed without having to get up to use the bathroom, not showering, constantly fatigued  - patient evaluated by psychiatry- may have some neurocog. slowing  - started on ritalin 5mg BID (8:00AM and 12:00AM) to aid with energy  - patient noted to be a bit more active yesterday, spending time out of bed and walking around   - CT head to rule out organic pathology-no evidence of any stroke or masses; B12, TSH WNL, syphillis screen negative

## 2018-05-11 NOTE — PROGRESS NOTE ADULT - PROBLEM SELECTOR PLAN 1
BG levels have been well controlled with goal of 100-180 inpatient   -Continue Lantus 9 units and Humalog 2 units inpatient  -Continue low dose correctional scale   -Can be discharged on oral agents- Metformin 1000 mg BID and Januvia 100 mg daily. Can follow up in Endocrinology clinic [phone]

## 2018-05-11 NOTE — PROGRESS NOTE ADULT - ATTENDING COMMENTS
Nadira Parra MD  pager   Diabetes team: [phone] business hours  [phone] night/weekend
Pt without complaints, reports continued improvement in hearing and overall strength, was observed finishing breakfast.  - etiology of presentation likely non-compliance with cpap compounded by URI  - decreased hearing likely 2/2 URI as well  - daughter interested in discharge to SNF, will d/w SW
Pt reports overall improvement in energy and hearing. Pt and family in agreement with discharge home. Discharge time 31min

## 2018-05-11 NOTE — PROGRESS NOTE ADULT - ASSESSMENT
77F with PMHx of DM, HTN, HLD, EDAUR, depression and B12 deficiency  is brought in by her daughter for lack of self care, complaining of fatigue and cough, admitted for URI, with likely an underlying neurocognitive disorder.

## 2018-05-11 NOTE — PROGRESS NOTE ADULT - PROBLEM SELECTOR PLAN 2
- patient telling physicians and daughter different stories  - waxing and waning coherency throughout evaluation the last 2 days  - daughter believes patient is also faking her hearing loss which is new since Friday  - psych consult appreciated   - CT head wnl - patient telling physicians and daughter different stories  - waxing and waning coherency throughout evaluation the last 2 days  - daughter believes patient is also faking her hearing loss which is new since Friday however exam is consistent with decrease hearing being 2/2 URI, which has been improving  - psych consult appreciated   - CT head wnl

## 2018-05-11 NOTE — DISCHARGE NOTE ADULT - PLAN OF CARE
Improvement Patient admitted with cough and sputum production, and wheezing, likely due to a viral illness. She improved with nebulizer treatment and supportive care. Continue to use the tessalon pearls as needed for cough. Ongoing care Patient should continue to take the ritalin as prescribed, and follow up outpatient with a psychiatrist. It is imperative that patient takes her diabetic medications. Should she not, her A1c level will continue to rise and patient may require long term insulin treatment. Please continue to take home medications. Please continue to take your sertraline.

## 2018-05-11 NOTE — PROGRESS NOTE ADULT - PROBLEM SELECTOR PLAN 4
- per daughter new hearing loss in the right ear  - otoscopic exam shows inflammation in the b/l ears R>L likely in setting of viral URI as well as component of voluntary hearing loss  - hearing improved throughout stay

## 2018-05-11 NOTE — PROGRESS NOTE ADULT - PROBLEM SELECTOR PLAN 6
- outpatient sleep studies indicate EDUAR  - poor compliance with CPAP- patient used few hours overnight  - component of  patients fatigue likely due to noncompliance with CPAP

## 2018-05-13 LAB
BACTERIA BLD CULT: SIGNIFICANT CHANGE UP
BACTERIA BLD CULT: SIGNIFICANT CHANGE UP

## 2020-04-01 ENCOUNTER — OUTPATIENT (OUTPATIENT)
Dept: OUTPATIENT SERVICES | Facility: HOSPITAL | Age: 80
LOS: 1 days | End: 2020-04-01

## 2020-04-01 DIAGNOSIS — Z90.710 ACQUIRED ABSENCE OF BOTH CERVIX AND UTERUS: Chronic | ICD-10-CM

## 2020-04-01 DIAGNOSIS — Z90.49 ACQUIRED ABSENCE OF OTHER SPECIFIED PARTS OF DIGESTIVE TRACT: Chronic | ICD-10-CM

## 2020-04-07 ENCOUNTER — INPATIENT (INPATIENT)
Facility: HOSPITAL | Age: 80
LOS: 14 days | Discharge: HOME HEALTH | End: 2020-04-22
Attending: HOSPITALIST | Admitting: HOSPITALIST
Payer: MEDICARE

## 2020-04-07 VITALS
TEMPERATURE: 98 F | SYSTOLIC BLOOD PRESSURE: 115 MMHG | OXYGEN SATURATION: 100 % | HEART RATE: 76 BPM | DIASTOLIC BLOOD PRESSURE: 65 MMHG | RESPIRATION RATE: 16 BRPM
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
## 2020-04-07 DIAGNOSIS — Z90.710 ACQUIRED ABSENCE OF BOTH CERVIX AND UTERUS: Chronic | ICD-10-CM

## 2020-04-07 DIAGNOSIS — Z90.49 ACQUIRED ABSENCE OF OTHER SPECIFIED PARTS OF DIGESTIVE TRACT: Chronic | ICD-10-CM

## 2020-04-07 LAB
ALBUMIN SERPL ELPH-MCNC: 4 G/DL — SIGNIFICANT CHANGE UP (ref 3.3–5)
ALP SERPL-CCNC: 55 U/L — SIGNIFICANT CHANGE UP (ref 40–120)
ALT FLD-CCNC: 10 U/L — SIGNIFICANT CHANGE UP (ref 4–33)
ANION GAP SERPL CALC-SCNC: 15 MMO/L — HIGH (ref 7–14)
APTT BLD: 27.8 SEC — SIGNIFICANT CHANGE UP (ref 27.5–36.3)
AST SERPL-CCNC: 17 U/L — SIGNIFICANT CHANGE UP (ref 4–32)
BASOPHILS # BLD AUTO: 0.01 K/UL — SIGNIFICANT CHANGE UP (ref 0–0.2)
BASOPHILS NFR BLD AUTO: 0.1 % — SIGNIFICANT CHANGE UP (ref 0–2)
BILIRUB SERPL-MCNC: 0.5 MG/DL — SIGNIFICANT CHANGE UP (ref 0.2–1.2)
BUN SERPL-MCNC: 25 MG/DL — HIGH (ref 7–23)
CALCIUM SERPL-MCNC: 9.9 MG/DL — SIGNIFICANT CHANGE UP (ref 8.4–10.5)
CHLORIDE SERPL-SCNC: 97 MMOL/L — LOW (ref 98–107)
CK SERPL-CCNC: 101 U/L — SIGNIFICANT CHANGE UP (ref 25–170)
CO2 SERPL-SCNC: 23 MMOL/L — SIGNIFICANT CHANGE UP (ref 22–31)
CREAT SERPL-MCNC: 1.27 MG/DL — SIGNIFICANT CHANGE UP (ref 0.5–1.3)
EOSINOPHIL # BLD AUTO: 0 K/UL — SIGNIFICANT CHANGE UP (ref 0–0.5)
EOSINOPHIL NFR BLD AUTO: 0 % — SIGNIFICANT CHANGE UP (ref 0–6)
GLUCOSE SERPL-MCNC: 116 MG/DL — HIGH (ref 70–99)
HCT VFR BLD CALC: 36.8 % — SIGNIFICANT CHANGE UP (ref 34.5–45)
HGB BLD-MCNC: 11.9 G/DL — SIGNIFICANT CHANGE UP (ref 11.5–15.5)
IMM GRANULOCYTES NFR BLD AUTO: 0.3 % — SIGNIFICANT CHANGE UP (ref 0–1.5)
INR BLD: 1.18 — HIGH (ref 0.88–1.17)
LYMPHOCYTES # BLD AUTO: 1.33 K/UL — SIGNIFICANT CHANGE UP (ref 1–3.3)
LYMPHOCYTES # BLD AUTO: 12.9 % — LOW (ref 13–44)
MCHC RBC-ENTMCNC: 25.6 PG — LOW (ref 27–34)
MCHC RBC-ENTMCNC: 32.3 % — SIGNIFICANT CHANGE UP (ref 32–36)
MCV RBC AUTO: 79.1 FL — LOW (ref 80–100)
MONOCYTES # BLD AUTO: 0.92 K/UL — HIGH (ref 0–0.9)
MONOCYTES NFR BLD AUTO: 8.9 % — SIGNIFICANT CHANGE UP (ref 2–14)
NEUTROPHILS # BLD AUTO: 8.05 K/UL — HIGH (ref 1.8–7.4)
NEUTROPHILS NFR BLD AUTO: 77.8 % — HIGH (ref 43–77)
NRBC # FLD: 0 K/UL — SIGNIFICANT CHANGE UP (ref 0–0)
PLATELET # BLD AUTO: 279 K/UL — SIGNIFICANT CHANGE UP (ref 150–400)
PMV BLD: 11 FL — SIGNIFICANT CHANGE UP (ref 7–13)
POTASSIUM SERPL-MCNC: 3.9 MMOL/L — SIGNIFICANT CHANGE UP (ref 3.5–5.3)
POTASSIUM SERPL-SCNC: 3.9 MMOL/L — SIGNIFICANT CHANGE UP (ref 3.5–5.3)
PROT SERPL-MCNC: 7.6 G/DL — SIGNIFICANT CHANGE UP (ref 6–8.3)
PROTHROM AB SERPL-ACNC: 13.5 SEC — HIGH (ref 9.8–13.1)
RBC # BLD: 4.65 M/UL — SIGNIFICANT CHANGE UP (ref 3.8–5.2)
RBC # FLD: 13.2 % — SIGNIFICANT CHANGE UP (ref 10.3–14.5)
SODIUM SERPL-SCNC: 135 MMOL/L — SIGNIFICANT CHANGE UP (ref 135–145)
TROPONIN T, HIGH SENSITIVITY: 13 NG/L — SIGNIFICANT CHANGE UP (ref ?–14)
TROPONIN T, HIGH SENSITIVITY: 15 NG/L — SIGNIFICANT CHANGE UP (ref ?–14)
WBC # BLD: 10.34 K/UL — SIGNIFICANT CHANGE UP (ref 3.8–10.5)
WBC # FLD AUTO: 10.34 K/UL — SIGNIFICANT CHANGE UP (ref 3.8–10.5)

## 2020-04-07 PROCEDURE — 70450 CT HEAD/BRAIN W/O DYE: CPT | Mod: 26

## 2020-04-07 PROCEDURE — 71260 CT THORAX DX C+: CPT | Mod: 26

## 2020-04-07 PROCEDURE — 73521 X-RAY EXAM HIPS BI 2 VIEWS: CPT | Mod: 26

## 2020-04-07 PROCEDURE — 70486 CT MAXILLOFACIAL W/O DYE: CPT | Mod: 26

## 2020-04-07 PROCEDURE — 72125 CT NECK SPINE W/O DYE: CPT | Mod: 26

## 2020-04-07 PROCEDURE — 74177 CT ABD & PELVIS W/CONTRAST: CPT | Mod: 26

## 2020-04-07 RX ORDER — INSULIN LISPRO 100/ML
VIAL (ML) SUBCUTANEOUS
Refills: 0 | Status: DISCONTINUED | OUTPATIENT
Start: 2020-04-07 | End: 2020-04-22

## 2020-04-07 RX ORDER — DEXTROSE 50 % IN WATER 50 %
15 SYRINGE (ML) INTRAVENOUS ONCE
Refills: 0 | Status: DISCONTINUED | OUTPATIENT
Start: 2020-04-07 | End: 2020-04-22

## 2020-04-07 RX ORDER — ASPIRIN/CALCIUM CARB/MAGNESIUM 324 MG
81 TABLET ORAL DAILY
Refills: 0 | Status: DISCONTINUED | OUTPATIENT
Start: 2020-04-07 | End: 2020-04-22

## 2020-04-07 RX ORDER — AMLODIPINE BESYLATE 2.5 MG/1
1 TABLET ORAL
Qty: 0 | Refills: 0 | DISCHARGE

## 2020-04-07 RX ORDER — SERTRALINE 25 MG/1
50 TABLET, FILM COATED ORAL DAILY
Refills: 0 | Status: DISCONTINUED | OUTPATIENT
Start: 2020-04-07 | End: 2020-04-22

## 2020-04-07 RX ORDER — DEXTROSE 50 % IN WATER 50 %
25 SYRINGE (ML) INTRAVENOUS ONCE
Refills: 0 | Status: DISCONTINUED | OUTPATIENT
Start: 2020-04-07 | End: 2020-04-22

## 2020-04-07 RX ORDER — GLUCAGON INJECTION, SOLUTION 0.5 MG/.1ML
1 INJECTION, SOLUTION SUBCUTANEOUS ONCE
Refills: 0 | Status: DISCONTINUED | OUTPATIENT
Start: 2020-04-07 | End: 2020-04-22

## 2020-04-07 RX ORDER — ENOXAPARIN SODIUM 100 MG/ML
40 INJECTION SUBCUTANEOUS DAILY
Refills: 0 | Status: DISCONTINUED | OUTPATIENT
Start: 2020-04-07 | End: 2020-04-22

## 2020-04-07 RX ORDER — DEXTROSE 50 % IN WATER 50 %
12.5 SYRINGE (ML) INTRAVENOUS ONCE
Refills: 0 | Status: DISCONTINUED | OUTPATIENT
Start: 2020-04-07 | End: 2020-04-22

## 2020-04-07 RX ORDER — SODIUM CHLORIDE 9 MG/ML
1000 INJECTION, SOLUTION INTRAVENOUS
Refills: 0 | Status: DISCONTINUED | OUTPATIENT
Start: 2020-04-07 | End: 2020-04-22

## 2020-04-07 RX ADMIN — ENOXAPARIN SODIUM 40 MILLIGRAM(S): 100 INJECTION SUBCUTANEOUS at 19:07

## 2020-04-07 RX ADMIN — Medication 81 MILLIGRAM(S): at 19:07

## 2020-04-07 RX ADMIN — SERTRALINE 50 MILLIGRAM(S): 25 TABLET, FILM COATED ORAL at 19:07

## 2020-04-07 NOTE — ED PROVIDER NOTE - EYES, MLM
Clear bilaterally, pupils equal, round and reactive to light. EOMS intact but patient poor at following.

## 2020-04-07 NOTE — H&P ADULT - HISTORY OF PRESENT ILLNESS
78 y/o female PMHx of dementia, DM, HTN presents to ER for multiple complaints. Pt. is relatively poor historian, slow to answer questions. Collateral information gained from daughter Cait - states patient has had multiple falls at home (unwitnessed) patient states she has been feeling weak and "passing out" at home - daughter states last week she found patient on floor in kitchen in the morning (awake) and brought her to her PMD who sent her for xray and ct scan which were negative. Daughter states at times when she falls it looks like she is "spasming" but no incontinence or tongue biting. Pt. has negative EEG last year for similar events. Now these falls/events are happening more frequently and patient c/o weakness and also is not eating. Pt. states has no appetite. Pt. also c/o pain to face/head and left side of chest wall from falls but unable to elaborate further. Denies cough sob fevers at this time. No one sick at home.    d/w another daughter Gera, pt had 4 episodes this month, with LOC and black stare followed by "body twitching", with bowel and bladder incontinence, followed by episode of lethargy.  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 EEG were okay.     In ED, pt had multiple CTs, no fractures. Noted ground glass opacities on CT chest, COVID19 testing sent.   The daughter Aneta is tested positive but she was self isolating at home. no significant interaction.

## 2020-04-07 NOTE — H&P ADULT - NSHPREVIEWOFSYSTEMS_GEN_ALL_CORE
General: +weakness, no fever/chills, no weight loss/gain, poor PO intake  Skin/Breast: no rash, no jaundice  Ophthalmologic: no vision changes, no dry eyes   Respiratory and Thorax: no cough, no wheezing, no hemoptysis, no dyspnea  Cardiovascular: no chest pain, no shortness of breath, no orthopnea  Gastrointestinal: +n/v, no diarrhea , no abdominal pain, no dysphagia   Genitourinary: no dysuria, no frequency, no nocturia, no hematuria  Musculoskeletal: no trauma, no sprain/strain, no myalgias, no arthralgias, no fracture  Neurological: no HA, no dizziness, no weakness, no numbness  Psychiatric: no depression, no SI/HI  Hematology/Lymphatics: no easy bruising  Endocrine: no heat or cold intolerance. no weight gain or loss  Allergic/Immunologic: no allergy or recent reaction

## 2020-04-07 NOTE — ED PROVIDER NOTE - OBJECTIVE STATEMENT
78 y/o female hx DM HTN presents to ER for multiple complaints. Pt. is realtive poor historian, slow to answer questions. Collateral information gained from daughter Crystal - states patient has had multiple falls at home (unwitnessed) patient states she has been feeling weak and "passing out" at home - daughter states last week she found patient on floor in kitchen in the morning (awake) and brought her to her PMD who sent her for xray and ct scan which were negative. Daughter states at times when she falls it looks like she is "spasming" but no incontinence or tongue biting. Pt. has negative eeg last year for similar events. Now these falls/events are happening more frequently and patient c/o weakness and also is not eating. Pt. states has no appetite. Pt. also c/o pain to face/head and left side of chest wall from falls but unable to elaborate further. Denies cough sob fevers at this time. No one sick at home.

## 2020-04-07 NOTE — H&P ADULT - NSHPLABSRESULTS_GEN_ALL_CORE
LABS:                        11.9   10.34 )-----------( 279      ( 07 Apr 2020 13:00 )             36.8     04-07    135  |  97<L>  |  25<H>  ----------------------------<  116<H>  3.9   |  23  |  1.27    Ca    9.9      07 Apr 2020 13:00    TPro  7.6  /  Alb  4.0  /  TBili  0.5  /  DBili  x   /  AST  17  /  ALT  10  /  AlkPhos  55  04-07    PT/INR - ( 07 Apr 2020 13:00 )   PT: 13.5 SEC;   INR: 1.18          PTT - ( 07 Apr 2020 13:00 )  PTT:27.8 SEC  CAPILLARY BLOOD GLUCOSE      POCT Blood Glucose.: 117 mg/dL (07 Apr 2020 11:27)    CARDIAC MARKERS ( 07 Apr 2020 13:00 )  x     / x     / 101 u/L / x     / x              RADIOLOGY & ADDITIONAL TESTS:    Imaging Personally Reviewed:  [x] YES  [ ] NO    Consultant(s) Notes Reviewed:  [x] YES  [ ] NO    Care Discussed with Consultants/Other Providers [x] YES  [ ] NO

## 2020-04-07 NOTE — ED ADULT TRIAGE NOTE - CHIEF COMPLAINT QUOTE
Pt s/p fall /syncope last Thursday  seen by PCP on Thurs;  EKG, CT/ head and xray normal.  As per daughter via telephone , pt  is experiencing intermittent spasms x few weeks ,  chest pain. N/V, weakness ,  poor appetite x 2 days . and syncopal episode this am while she was in the [place].   Bruising  noted to L facial area and knee.  Denies dizziness, sob ..Pt is poor historian

## 2020-04-07 NOTE — H&P ADULT - ATTENDING COMMENTS
Shane Best will be covering for me starting 4/8/20. He can be reached at  if needed.     - Dr. JUAN JOSE Leach ([facility])  - [phone]

## 2020-04-07 NOTE — ED PROVIDER NOTE - ATTENDING CONTRIBUTION TO CARE
I have seen and evaluated this patient-agree with the above hpi as documented by TIMBO Dunn.  On my exam  GEN - NAD; Ao to name and place, conversational  HEAD - +left periorbital ecchymoses and mild tenderness, no crepitus  EYES- PERRL, EOMI, no e/o entrapment  ENT: Airway patent, mmm, Oral cavity and pharynx normal. No inflammation, swelling, exudate, or lesions.  NECK: Neck supple, non-tender without lymphadenopathy, no masses.  PULMONARY - CTA b/l, symmetric breath sounds.   CARDIAC -s1s2, RRR, no M,G,R, +chest wall tenderness left side  ABDOMEN - +BS, ND, +diffusely tender, soft, no guarding, no rebound, no masses   BACK - no CVA tenderness, Normal  spine   EXTREMITIES - FROM, no focal tenderness, symmetric pulses, capillary refill < 2 seconds, no edema   SKIN - no rash or bruising   NEUROLOGIC - ao3, cn2-12 intact, 5/5 strength in all extremities, sensation grossly intact, f-n nl  PSYCH -calm, cooperative  Plan for labs/ekg/ct head/c-spine/facial/chest/a/p, monitor, reassess.

## 2020-04-07 NOTE — ED PROVIDER NOTE - CLINICAL SUMMARY MEDICAL DECISION MAKING FREE TEXT BOX
80 y/o female c/o frequent unwitnessed falls, chest wall/abdominal pain, facial trauma and lack of appetitie w/ weakness  -failure to thrive w/ subsequent falls - r/o fractures, intracranial/abdominal injury, r/o acs, r/o electrolyte abnormalities  -labs, ua/ucx,   -ct head m/f, cspine, chest, abd,  xray hip/pelvis  -most likely admission for unsafe dc home - will need further neuro/cards/pt work up

## 2020-04-07 NOTE — H&P ADULT - NSHPPHYSICALEXAM_GEN_ALL_CORE
Vital Signs Last 24 Hrs  T(C): 36.7 (07 Apr 2020 14:52), Max: 36.7 (07 Apr 2020 14:52)  T(F): 98 (07 Apr 2020 14:52), Max: 98 (07 Apr 2020 14:52)  HR: 73 (07 Apr 2020 14:52) (73 - 76)  BP: 133/41 (07 Apr 2020 14:52) (115/65 - 133/41)  BP(mean): --  RR: 16 (07 Apr 2020 14:52) (16 - 16)  SpO2: 100% (07 Apr 2020 14:52) (100% - 100%)    PHYSICAL EXAM:  GENERAL: NAD, well-developed, comfortable, on room air, slow to react, poor historian  HEAD:  Atraumatic, Normocephalic  EYES: EOMI, PERRLA, conjunctiva and sclera clear, +left eye ecchomosis  NECK: Supple, No JVD  CHEST/LUNG: mild decrease breath sounds bilaterally; No wheeze   HEART: Regular rate and rhythm; No murmurs, rubs, or gallops  ABDOMEN: Soft, Nontender, Nondistended; Bowel sounds present  Neuro: AAOx3, slow to react, poor historian, no focal weakness, 5/5 b/l extremity strength  EXTREMITIES:  2+ Peripheral Pulses, No clubbing, cyanosis, or edema  SKIN: No rashes or lesions

## 2020-04-07 NOTE — H&P ADULT - ASSESSMENT
80 y/o female PMHx of dementia, DM, HTN presents to ER for multiple complaints. Pt. is relatively poor historian, slow to answer questions, admitted for failure to thrive, poor PO intake, multiple episodes of syncope, followed by "body twitching", with bowel and bladder incontinence, followed by episode of lethargy.    # Syncope, r/o Seizure vs. infectious etiology  # Ground glass opacities on CT chest  # Dementia  # DM/HTN    Plan:  Per the daughters, syncopal episodes with body twitching, becoming more frequent this month. 4 episodes total.   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 EEG were okay at that time.   Pt require help with ADLs including medication administration.   Now pt with very poor PO intake, sleeping most of the day.  In ED, pt had multiple CTs, no fractures. Noted ground glass opacities on CT chest, COVID19 testing sent.   The daughter Aneta is tested positive but she was self isolating at home. no significant interaction.   Check COVID19 infection. check UA, urine culture  tele monitor, check TTE, trop neg x 2, EKG unimpressive.  check orthostatics, TSH, vit D    DM: will hold PO home meds (Janumet 50/100 mg BID), check hgbA1c  insulin sliding scale    HTN: BP stable. hold Norvasc 5 mg, hold Lisinopril 20 mg.  can resume as needed.     Depression/anxiety: c/w Sertraline 50 mg qdaily    Vit D Def: check vit D    medications reviewed with the daughter. 80 y/o female PMHx of dementia, DM, HTN presents to ER for multiple complaints. Pt. is relatively poor historian, slow to answer questions, admitted for failure to thrive, poor PO intake, multiple episodes of syncope, followed by "body twitching", with bowel and bladder incontinence, followed by episode of lethargy.    # Syncope, r/o Seizure vs. infectious etiology  # Ground glass opacities on CT chest  # Dementia  # DM/HTN    Plan:  Syncope, r/o Seizure vs. infectious etiology (Ground glass opacities on CT chest)  Per the daughters, syncopal episodes with body twitching, becoming more frequent this month. 4 episodes total.  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 EEG were okay at that time.   Pt requires help with ADLs including medication administration. AAO x 3 baseline but forgetful.  Now pt with very poor PO intake, sleeping most of the day. no fever no cough.  In ED, pt had multiple CTs, no fractures. Noted ground glass opacities on CT chest, COVID19 testing sent.   The daughter Aneta was tested positive but she was self isolating at home. no significant interaction per the daughter.   Check COVID19 PCR. check UA, urine culture  tele monitor, check TTE, trop neg x 2, EKG unimpressive.  check orthostatics, TSH, vit D  will consider neurology eval.     DM: will hold PO home meds (Janumet 50/100 mg BID), check hgbA1c  insulin sliding scale    HTN: BP stable. hold Norvasc 5 mg, hold Lisinopril 20 mg.  can resume as needed.     Depression/anxiety: c/w Sertraline 50 mg qdaily    Vit D Def: check vit D    medications reviewed with the daughter Georgie.   All questions answered. 78 y/o female PMHx of dementia, DM, HTN presents to ER for multiple complaints. Pt. is relatively poor historian, slow to answer questions, admitted for failure to thrive, poor PO intake, multiple episodes of syncope, followed by "body twitching", with bowel and bladder incontinence, followed by episode of lethargy, blank stare. 	    # Syncope, r/o Seizure vs. infectious etiology  # Ground glass opacities on CT chest  # Dementia  # DM/HTN    Plan:  Syncope, r/o Seizure vs. infectious etiology (Ground glass opacities on CT chest)  Per the daughters, syncopal episodes with body twitching, becoming more frequent this month. 4 episodes total.  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 EEG were okay at that time.   Pt requires help with ADLs including medication administration. AAO x 3 baseline but forgetful.  Now pt with very poor PO intake, sleeping most of the day. no fever no cough.  In ED, pt had multiple CTs, no fractures. Noted ground glass opacities on CT chest, COVID19 testing sent.   The daughter Aneta was tested positive but she was self isolating at home. no significant interaction per the daughter.   Check COVID19 PCR. check UA, urine culture  tele monitor, check TTE, trop neg x 2, EKG unimpressive.  check orthostatics, TSH, vit D  will consider neurology eval.     DM: will hold PO home meds (Janumet 50/100 mg BID), check hgbA1c  insulin sliding scale    HTN: BP stable. hold Norvasc 5 mg, hold Lisinopril 20 mg.  can resume as needed.     Depression/anxiety: c/w Sertraline 50 mg qdaily    Vit D Def: check vit D    medications reviewed with the daughter Georgie.   All questions answered.

## 2020-04-08 LAB
ALBUMIN SERPL ELPH-MCNC: 4 G/DL — SIGNIFICANT CHANGE UP (ref 3.3–5)
ALP SERPL-CCNC: 57 U/L — SIGNIFICANT CHANGE UP (ref 40–120)
ALT FLD-CCNC: 9 U/L — SIGNIFICANT CHANGE UP (ref 4–33)
ANION GAP SERPL CALC-SCNC: 12 MMO/L — SIGNIFICANT CHANGE UP (ref 7–14)
AST SERPL-CCNC: 18 U/L — SIGNIFICANT CHANGE UP (ref 4–32)
BILIRUB SERPL-MCNC: 0.6 MG/DL — SIGNIFICANT CHANGE UP (ref 0.2–1.2)
BUN SERPL-MCNC: 21 MG/DL — SIGNIFICANT CHANGE UP (ref 7–23)
CALCIUM SERPL-MCNC: 10.2 MG/DL — SIGNIFICANT CHANGE UP (ref 8.4–10.5)
CHLORIDE SERPL-SCNC: 99 MMOL/L — SIGNIFICANT CHANGE UP (ref 98–107)
CO2 SERPL-SCNC: 26 MMOL/L — SIGNIFICANT CHANGE UP (ref 22–31)
CREAT SERPL-MCNC: 0.95 MG/DL — SIGNIFICANT CHANGE UP (ref 0.5–1.3)
CRP SERPL-MCNC: 33 MG/L — HIGH
ERYTHROCYTE [SEDIMENTATION RATE] IN BLOOD: 62 MM/HR — HIGH (ref 4–25)
FOLATE SERPL-MCNC: > 20 NG/ML — HIGH (ref 4.7–20)
GLUCOSE SERPL-MCNC: 73 MG/DL — SIGNIFICANT CHANGE UP (ref 70–99)
HBA1C BLD-MCNC: 7.1 % — HIGH (ref 4–5.6)
HCT VFR BLD CALC: 37 % — SIGNIFICANT CHANGE UP (ref 34.5–45)
HGB BLD-MCNC: 12 G/DL — SIGNIFICANT CHANGE UP (ref 11.5–15.5)
MCHC RBC-ENTMCNC: 25.8 PG — LOW (ref 27–34)
MCHC RBC-ENTMCNC: 32.4 % — SIGNIFICANT CHANGE UP (ref 32–36)
MCV RBC AUTO: 79.6 FL — LOW (ref 80–100)
NRBC # FLD: 0 K/UL — SIGNIFICANT CHANGE UP (ref 0–0)
PLATELET # BLD AUTO: 306 K/UL — SIGNIFICANT CHANGE UP (ref 150–400)
PMV BLD: 12.1 FL — SIGNIFICANT CHANGE UP (ref 7–13)
POTASSIUM SERPL-MCNC: 4.3 MMOL/L — SIGNIFICANT CHANGE UP (ref 3.5–5.3)
POTASSIUM SERPL-SCNC: 4.3 MMOL/L — SIGNIFICANT CHANGE UP (ref 3.5–5.3)
PROCALCITONIN SERPL-MCNC: 0.44 NG/ML — HIGH (ref 0.02–0.1)
PROT SERPL-MCNC: 7.9 G/DL — SIGNIFICANT CHANGE UP (ref 6–8.3)
RBC # BLD: 4.65 M/UL — SIGNIFICANT CHANGE UP (ref 3.8–5.2)
RBC # FLD: 13.5 % — SIGNIFICANT CHANGE UP (ref 10.3–14.5)
SARS-COV-2 RNA SPEC QL NAA+PROBE: DETECTED
SODIUM SERPL-SCNC: 137 MMOL/L — SIGNIFICANT CHANGE UP (ref 135–145)
TSH SERPL-MCNC: 0.74 UIU/ML — SIGNIFICANT CHANGE UP (ref 0.27–4.2)
TSH SERPL-MCNC: 0.99 UIU/ML — SIGNIFICANT CHANGE UP (ref 0.27–4.2)
VIT B12 SERPL-MCNC: > 2000 PG/ML — HIGH (ref 200–900)
WBC # BLD: 7.47 K/UL — SIGNIFICANT CHANGE UP (ref 3.8–10.5)
WBC # FLD AUTO: 7.47 K/UL — SIGNIFICANT CHANGE UP (ref 3.8–10.5)

## 2020-04-08 PROCEDURE — 99223 1ST HOSP IP/OBS HIGH 75: CPT | Mod: GC

## 2020-04-08 RX ORDER — HYDROXYCHLOROQUINE SULFATE 200 MG
TABLET ORAL
Refills: 0 | Status: DISCONTINUED | OUTPATIENT
Start: 2020-04-08 | End: 2020-04-08

## 2020-04-08 RX ADMIN — Medication 81 MILLIGRAM(S): at 09:29

## 2020-04-08 RX ADMIN — SERTRALINE 50 MILLIGRAM(S): 25 TABLET, FILM COATED ORAL at 09:29

## 2020-04-08 RX ADMIN — ENOXAPARIN SODIUM 40 MILLIGRAM(S): 100 INJECTION SUBCUTANEOUS at 09:29

## 2020-04-08 NOTE — PHARMACOTHERAPY INTERVENTION NOTE - COMMENTS
FUNMILAYO AGUILLON, 79y Female with COVID-19 infection, satting 100% on RA, CXR (+).    Recommendation(s):  1) Patient does not meet criteria for plaquenil at this time, would consider re-evaluating if patient becomes hypoxic and/or decompensates.    With kind regards,  Lele Dixon, PharmD  PGY-2 Infectious Diseases Pharmacy Resident  Spectra [phone]  .

## 2020-04-08 NOTE — PROGRESS NOTE ADULT - ASSESSMENT
80 y/o female PMHx of dementia, DM, HTN presents to ER for multiple complaints. Pt. is relatively poor historian, slow to answer questions, admitted for failure to thrive, poor PO intake, multiple episodes of syncope, followed by "body twitching", with bowel and bladder incontinence, followed by episode of lethargy, blank stare. 	    # Syncope, r/o Seizure vs. infectious etiology  # Ground glass opacities on CT chest  # Dementia  # DM/HTN    Plan:  Syncope, r/o Seizure vs. infectious etiology (Ground glass opacities on CT chest)  Per the daughters, syncopal episodes with body twitching, becoming more frequent this month. 4 episodes total.  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 EEG were okay at that time.   Pt requires help with ADLs including medication administration. AAO x 3 baseline but forgetful.  Now pt with very poor PO intake, sleeping most of the day. no fever no cough.  In ED, pt had multiple CTs, no fractures. Noted ground glass opacities on CT chest, COVID19 ended up (+)  The daughter Aneta was tested positive but she was self isolating at home. no significant interaction per the daughter.   tele monitor, check TTE, trop neg x 2, EKG unimpressive.  appreciate neuro input -> seizure/metabolic workup underway    COVID (+): so far 100% on RA, but GCO on CT chest  Ordered plaquenil  Will hold off IV antibiotics for now  Daily CRP/ferritin    DM: will hold PO home meds (Janumet 50/100 mg BID), check hgbA1c  insulin sliding scale    HTN: BP stable. hold Norvasc 5 mg, hold Lisinopril 20 mg.  can resume as needed.     Depression/anxiety: c/w Sertraline 50 mg qdaily    Vit D Def: check vit D

## 2020-04-08 NOTE — CONSULT NOTE ADULT - SUBJECTIVE AND OBJECTIVE BOX
NEUROLOGY CONSULT SERVICE  **************************  Due to the efforts to minimize all unnecessary patient interaction in light of the COVID-19 pandemic, this encounter was done virtually from our office, to be seen by Neurology attending. A chart review of the provider's notes and diagnostic data was performed. The patient's symptoms were discussed with his providers. At this time, a physical examination was not performed. Face-to-face visits and PE's have been limited, as per policy, to patients for whom it is required for medical-decision making. We are available for any questions or concerns at [phone].   ****************************    FUNMILAYO AGUILLON, 79y Female  [id number]      HPI:              PAST MEDICAL & SURGICAL HISTORY:  HTN  Dyslipidemia  Diabetes mellitus type II  S/P hysterectomy  S/P appendectomy        Vital Signs Last 24 Hrs  T(C): 36.6 (08 Apr 2020 05:22), Max: 36.8 (08 Apr 2020 00:38)  T(F): 97.8 (08 Apr 2020 05:22), Max: 98.2 (08 Apr 2020 00:38)  HR: 62 (08 Apr 2020 05:22) (62 - 85)  BP: 114/55 (08 Apr 2020 05:22) (114/53 - 144/62)  BP(mean): --  RR: 18 (08 Apr 2020 05:22) (16 - 18)  SpO2: 96% (08 Apr 2020 05:22) (96% - 100%)                          12.0   7.47  )-----------( 306      ( 08 Apr 2020 07:18 )             37.0     04-07    135  |  97<L>  |  25<H>  ----------------------------<  116<H>  3.9   |  23  |  1.27    Ca    9.9      07 Apr 2020 13:00    TPro  7.6  /  Alb  4.0  /  TBili  0.5  /  DBili  x   /  AST  17  /  ALT  10  /  AlkPhos  55  04-07    LIVER FUNCTIONS - ( 07 Apr 2020 13:00 )  Alb: 4.0 g/dL / Pro: 7.6 g/dL / ALK PHOS: 55 u/L / ALT: 10 u/L / AST: 17 u/L / GGT: x           PT/INR - ( 07 Apr 2020 13:00 )   PT: 13.5 SEC;   INR: 1.18          PTT - ( 07 Apr 2020 13:00 )  PTT:27.8 SEC    .  LABS:                         12.0   7.47  )-----------( 306      ( 08 Apr 2020 07:18 )             37.0     04-07    135  |  97<L>  |  25<H>  ----------------------------<  116<H>  3.9   |  23  |  1.27    Ca    9.9      07 Apr 2020 13:00    TPro  7.6  /  Alb  4.0  /  TBili  0.5  /  DBili  x   /  AST  17  /  ALT  10  /  AlkPhos  55  04-07    PT/INR - ( 07 Apr 2020 13:00 )   PT: 13.5 SEC;   INR: 1.18          PTT - ( 07 Apr 2020 13:00 )  PTT:27.8 SEC          RADIOLOGY, EKG & ADDITIONAL TESTS: Reviewed. NEUROLOGY CONSULT SERVICE  **************************  Due to the efforts to minimize all unnecessary patient interaction in light of the COVID-19 pandemic, this encounter was done virtually from our office, to be seen by Neurology attending. A chart review of the provider's notes and diagnostic data was performed. The patient's symptoms were discussed with his providers. At this time, a physical examination was not performed. Face-to-face visits and PE's have been limited, as per policy, to patients for whom it is required for medical-decision making. We are available for any questions or concerns at [phone].   ****************************    FUNMILAYO AGUILLON, 79y Female  [id number]      HPI:  78 y/o female PMHx of probable-dementia (forgetfulness, repeats), depression, EDUAR w/ narcolepsy, DM, HLD, HTN, Left lacunar stroke (no known residual deficits) presents to ER for multiple complaints admitted to medicine for COVID+ve PNA. Daughter states at times when she falls it looks like she is "spasming" but no incontinence or tongue biting. Pt. has negative EEG last year for similar events. Now these falls/events are happening more frequently and patient c/o weakness and also is not eating. Further collateral obtained from daughter Any ([phone]) who lives with pt, said pt has had random bouts of staring spells and incoherent speech associated with either bowel or bladder incontinence lasting upto 1 minute and takes several minutes to come out of it. Saw a neurologist (doesn't recall name) outpt 1 year ago, where a "short" EEG study was reportedly negative. MRI Brain w/o Impression read to author by pt's daughter showed mild-mod white matter chronic microvascular changes, remote L striatal lacunar infarct, otherwise no acute findings. Daughter denies pt ever had true LOC, denies hx of trauma, prior seizures or other neurological illness.     PAST MEDICAL & SURGICAL HISTORY:  HTN  Dyslipidemia  Diabetes mellitus type II  S/P hysterectomy  S/P appendectomy    Vital Signs Last 24 Hrs  T(C): 36.6 (08 Apr 2020 05:22), Max: 36.8 (08 Apr 2020 00:38)  T(F): 97.8 (08 Apr 2020 05:22), Max: 98.2 (08 Apr 2020 00:38)  HR: 62 (08 Apr 2020 05:22) (62 - 85)  BP: 114/55 (08 Apr 2020 05:22) (114/53 - 144/62)  BP(mean): --  RR: 18 (08 Apr 2020 05:22) (16 - 18)  SpO2: 96% (08 Apr 2020 05:22) (96% - 100%)           .  LABS:                         12.0   7.47  )-----------( 306      ( 08 Apr 2020 07:18 )             37.0     04-07    135  |  97<L>  |  25<H>  ----------------------------<  116<H>  3.9   |  23  |  1.27    Ca    9.9      07 Apr 2020 13:00    TPro  7.6  /  Alb  4.0  /  TBili  0.5  /  DBili  x   /  AST  17  /  ALT  10  /  AlkPhos  55  04-07    PT/INR - ( 07 Apr 2020 13:00 )   PT: 13.5 SEC;   INR: 1.18          PTT - ( 07 Apr 2020 13:00 )  PTT:27.8 SEC      RADIOLOGY, EKG & ADDITIONAL TESTS: Reviewed.   < from: CT Head No Cont (04.07.20 @ 14:20) >  Impression:    Brain CT:    No acute hemorrhage, hydrocephalus or displaced calvarial fracture.    Cervical spine CT: No acute fracture traumatic subluxation.  Right upper lobe infiltrate. Please see chest CT scan of the same day for further evaluation of the lungs.    < end of copied text > NEUROLOGY CONSULT SERVICE  **************************  Due to the efforts to minimize all unnecessary patient interaction in light of the COVID-19 pandemic, this encounter was done virtually from our office, to be seen by Neurology attending. A chart review of the provider's notes and diagnostic data was performed. The patient's symptoms were discussed with his providers. At this time, a physical examination was not performed. Face-to-face visits and PE's have been limited, as per policy, to patients for whom it is required for medical-decision making. We are available for any questions or concerns at [phone].   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 Daughter states at times when she falls it looks like she is "spasming" but no incontinence or tongue biting. Pt. has negative EEG last year for similar events. Now these falls/events are happening more frequently and patient c/o weakness and also is not eating. Further collateral obtained from daughter Any ([phone]) who lives with pt, said pt has had random bouts of staring spells and incoherent speech associated with either bowel or bladder incontinence lasting upto 1 minute and takes several minutes to come out of it. Saw a neurologist (doesn't recall name) outpt 1 year ago, where a "short" EEG study was reportedly negative. MRI Brain w/o Impression read to author by pt's daughter showed mild-mod white matter chronic microvascular changes, remote L striatal lacunar infarct, otherwise no acute findings. Daughter denies pt ever had true LOC, denies hx of trauma, prior seizures or other neurological illness.     PAST MEDICAL & SURGICAL HISTORY:  HTN  Dyslipidemia  Diabetes mellitus type II  S/P hysterectomy  S/P appendectomy    Vital Signs Last 24 Hrs  T(C): 36.6 (08 Apr 2020 05:22), Max: 36.8 (08 Apr 2020 00:38)  T(F): 97.8 (08 Apr 2020 05:22), Max: 98.2 (08 Apr 2020 00:38)  HR: 62 (08 Apr 2020 05:22) (62 - 85)  BP: 114/55 (08 Apr 2020 05:22) (114/53 - 144/62)  BP(mean): --  RR: 18 (08 Apr 2020 05:22) (16 - 18)  SpO2: 96% (08 Apr 2020 05:22) (96% - 100%)    Physical Examination:    Patient awake and alert and answers to name and place but not time  She follows requests and can name and repeat   No focal cranial nerve deficits and   Motor exam shows no focal weakness  Sensation to pin prick intact bilaterally   Gait not tested           .  LABS:                         12.0   7.47  )-----------( 306      ( 08 Apr 2020 07:18 )             37.0     04-07    135  |  97<L>  |  25<H>  ----------------------------<  116<H>  3.9   |  23  |  1.27    Ca    9.9      07 Apr 2020 13:00    TPro  7.6  /  Alb  4.0  /  TBili  0.5  /  DBili  x   /  AST  17  /  ALT  10  /  AlkPhos  55  04-07    PT/INR - ( 07 Apr 2020 13:00 )   PT: 13.5 SEC;   INR: 1.18          PTT - ( 07 Apr 2020 13:00 )  PTT:27.8 SEC      RADIOLOGY, EKG & ADDITIONAL TESTS: Reviewed.   < from: CT Head No Cont (04.07.20 @ 14:20) >  Impression:    Brain CT:    No acute hemorrhage, hydrocephalus or displaced calvarial fracture.    Cervical spine CT: No acute fracture traumatic subluxation.  Right upper lobe infiltrate. Please see chest CT scan of the same day for further evaluation of the lungs.    < end of copied text >

## 2020-04-08 NOTE — CONSULT NOTE ADULT - ASSESSMENT
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    Impression: Events described by family are suspicious for seizures. No reported LOC.     [] obtain 24 hour EEG monitoring  [] check B12, folate, TSH, copper, ceruloplasmin, ESR, CRP  [] sieuzre / aspiration precautions  [] IV Ativan 2mg PRN ONLY for GTC seizures > 3 minutes  [] c/w tx COVID infection, correct any electrolyte derangements  [] medical/supportive care per primary team    attending note to follow    daughters' questions and concerns addressed

## 2020-04-09 DIAGNOSIS — Z71.89 OTHER SPECIFIED COUNSELING: ICD-10-CM

## 2020-04-09 LAB
ALBUMIN SERPL ELPH-MCNC: 4 G/DL — SIGNIFICANT CHANGE UP (ref 3.3–5)
ALP SERPL-CCNC: 60 U/L — SIGNIFICANT CHANGE UP (ref 40–120)
ALT FLD-CCNC: 8 U/L — SIGNIFICANT CHANGE UP (ref 4–33)
ANION GAP SERPL CALC-SCNC: 16 MMO/L — HIGH (ref 7–14)
AST SERPL-CCNC: 16 U/L — SIGNIFICANT CHANGE UP (ref 4–32)
BILIRUB SERPL-MCNC: 0.7 MG/DL — SIGNIFICANT CHANGE UP (ref 0.2–1.2)
BUN SERPL-MCNC: 21 MG/DL — SIGNIFICANT CHANGE UP (ref 7–23)
CALCIUM SERPL-MCNC: 10 MG/DL — SIGNIFICANT CHANGE UP (ref 8.4–10.5)
CERULOPLASMIN SERPL-MCNC: 32 MG/DL — SIGNIFICANT CHANGE UP (ref 16–45)
CHLORIDE SERPL-SCNC: 96 MMOL/L — LOW (ref 98–107)
CO2 SERPL-SCNC: 23 MMOL/L — SIGNIFICANT CHANGE UP (ref 22–31)
CREAT SERPL-MCNC: 0.95 MG/DL — SIGNIFICANT CHANGE UP (ref 0.5–1.3)
CRP SERPL-MCNC: 27.7 MG/L — HIGH
FERRITIN SERPL-MCNC: 590.7 NG/ML — HIGH (ref 15–150)
GLUCOSE SERPL-MCNC: 107 MG/DL — HIGH (ref 70–99)
HCT VFR BLD CALC: 37.5 % — SIGNIFICANT CHANGE UP (ref 34.5–45)
HGB BLD-MCNC: 12 G/DL — SIGNIFICANT CHANGE UP (ref 11.5–15.5)
MCHC RBC-ENTMCNC: 25.3 PG — LOW (ref 27–34)
MCHC RBC-ENTMCNC: 32 % — SIGNIFICANT CHANGE UP (ref 32–36)
MCV RBC AUTO: 78.9 FL — LOW (ref 80–100)
NRBC # FLD: 0 K/UL — SIGNIFICANT CHANGE UP (ref 0–0)
PLATELET # BLD AUTO: 320 K/UL — SIGNIFICANT CHANGE UP (ref 150–400)
PMV BLD: 11.6 FL — SIGNIFICANT CHANGE UP (ref 7–13)
POTASSIUM SERPL-MCNC: 3.7 MMOL/L — SIGNIFICANT CHANGE UP (ref 3.5–5.3)
POTASSIUM SERPL-SCNC: 3.7 MMOL/L — SIGNIFICANT CHANGE UP (ref 3.5–5.3)
PROCALCITONIN SERPL-MCNC: 0.3 NG/ML — HIGH (ref 0.02–0.1)
PROT SERPL-MCNC: 7.5 G/DL — SIGNIFICANT CHANGE UP (ref 6–8.3)
RBC # BLD: 4.75 M/UL — SIGNIFICANT CHANGE UP (ref 3.8–5.2)
RBC # FLD: 13.2 % — SIGNIFICANT CHANGE UP (ref 10.3–14.5)
SODIUM SERPL-SCNC: 135 MMOL/L — SIGNIFICANT CHANGE UP (ref 135–145)
WBC # BLD: 7.42 K/UL — SIGNIFICANT CHANGE UP (ref 3.8–10.5)
WBC # FLD AUTO: 7.42 K/UL — SIGNIFICANT CHANGE UP (ref 3.8–10.5)

## 2020-04-09 PROCEDURE — 99231 SBSQ HOSP IP/OBS SF/LOW 25: CPT | Mod: GC

## 2020-04-09 PROCEDURE — 95816 EEG AWAKE AND DROWSY: CPT | Mod: 26

## 2020-04-09 PROCEDURE — 93010 ELECTROCARDIOGRAM REPORT: CPT

## 2020-04-09 RX ORDER — SODIUM CHLORIDE 9 MG/ML
1000 INJECTION INTRAMUSCULAR; INTRAVENOUS; SUBCUTANEOUS
Refills: 0 | Status: DISCONTINUED | OUTPATIENT
Start: 2020-04-09 | End: 2020-04-09

## 2020-04-09 RX ADMIN — SODIUM CHLORIDE 75 MILLILITER(S): 9 INJECTION INTRAMUSCULAR; INTRAVENOUS; SUBCUTANEOUS at 15:58

## 2020-04-09 RX ADMIN — Medication 81 MILLIGRAM(S): at 12:48

## 2020-04-09 RX ADMIN — SERTRALINE 50 MILLIGRAM(S): 25 TABLET, FILM COATED ORAL at 12:48

## 2020-04-09 RX ADMIN — ENOXAPARIN SODIUM 40 MILLIGRAM(S): 100 INJECTION SUBCUTANEOUS at 12:48

## 2020-04-09 NOTE — PROGRESS NOTE ADULT - ASSESSMENT
78 y/o female PMHx of dementia, DM, HTN presents to ER for multiple complaints. Pt. is relatively poor historian, slow to answer questions, admitted for failure to thrive, poor PO intake, multiple episodes of syncope, followed by "body twitching", with bowel and bladder incontinence, followed by episode of lethargy, blank stare. 	    # Syncope, r/o Seizure vs. infectious etiology  # Ground glass opacities on CT chest  # Dementia  # DM/HTN    Plan:  Syncope, r/o Seizure vs. infectious etiology (Ground glass opacities on CT chest)  Per the daughters, syncopal episodes with body twitching, becoming more frequent this month. 4 episodes total.  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 EEG were okay at that time.   Pt requires help with ADLs including medication administration. AAO x 3 baseline but forgetful.  Now pt with very poor PO intake, sleeping most of the day. no fever no cough.  In ED, pt had multiple CTs, no fractures. Noted ground glass opacities on CT chest, COVID19 ended up (+)  The daughter Aneta was tested positive but she was self isolating at home. no significant interaction per the daughter.   tele monitor, check TTE, trop neg x 2, EKG unimpressive.  appreciate neuro input -> seizure/metabolic workup underway    COVID (+): so far 100% on RA, but GCO on CT chest  Holding off plaquenil for now  Will hold off IV antibiotics for now  Daily CRP/ferritin    DM: will hold PO home meds (Janumet 50/100 mg BID)  insulin sliding scale    HTN: BP stable. hold Norvasc 5 mg, hold Lisinopril 20 mg.  can resume as needed.     Depression/anxiety: c/w Sertraline 50 mg qdaily    DVT prophylaxis: SQ heparin

## 2020-04-09 NOTE — CONSULT NOTE ADULT - SUBJECTIVE AND OBJECTIVE BOX
For all Cardiology service contact information, go to [email or web address] and use "[employer]" to login.    HPI:    78 y/o female PMHx of dementia, DM, HTN presents to ER for multiple complaints. Pt. is relatively poor historian, slow to answer questions. Collateral information gained from daughter Cait - states patient has had multiple falls at home (unwitnessed) patient states she has been feeling weak and "passing out" at home - daughter states last week she found patient on floor in kitchen in the morning (awake) and brought her to her PMD who sent her for xray and ct scan which were negative. Daughter states at times when she falls it looks like she is "spasming" but no incontinence or tongue biting. Pt. has negative EEG last year for similar events. Now these falls/events are happening more frequently and patient c/o weakness and also is not eating. Pt. states has no appetite. Pt. also c/o pain to face/head and left side of chest wall from falls but unable to elaborate further. Denies cough sob fevers at this time. No one sick at home.    d/w another daughter Gera, pt had 4 episodes this month, with LOC and black stare followed by "body twitching", with bowel and bladder incontinence, followed by episode of lethargy.  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 EEG were okay.     In ED, pt had multiple CTs, no fractures. Noted ground glass opacities on CT chest, COVID19 testing sent.   The daughter Aneta is tested positive but she was self isolating at home. no significant interaction. (07 Apr 2020 17:33)    PAST MEDICAL & SURGICAL HISTORY:  HTN  Dyslipidemia  Diabetes mellitus type II  S/P hysterectomy  S/P appendectomy    SHx:   -------------------------------------------------------------------------------------------  ROS:  CV: chest pain (-), palpitation (-) PULM: SOB (-), cough (-), wheezing (-), hemoptysis (-). CONST: fever (-), chills (-) or fatigue (-). HEENT:Visual changes (-); vertigo or throat pain (-);  neck stiffness (-). GI: abdominal pain (-) , nausea/vomiting (-), hematemesis, (-)melena (-), hematochezia (-). : dysuria (-), frequency (-), hematuria (-). NEURO: numbness (-), weakness (-). SKIN: itching (-), rash (-)    All other review of systems is negative unless indicated above.   -------------------------------------------------------------------------------------------  VS:  T(C): 36.7 (04-09-20 @ 12:29), Max: 36.7 (04-09-20 @ 12:29)  HR: 69 (04-09-20 @ 12:29) (69 - 75)  BP: 126/65 (04-09-20 @ 12:29) (126/65 - 130/55)  RR: 17 (04-09-20 @ 12:29) (17 - 18)  SpO2: 98% (04-09-20 @ 12:29) (98% - 98%)  I&O's Summary    08 Apr 2020 07:01  -  09 Apr 2020 07:00  --------------------------------------------------------  IN: 60 mL / OUT: 0 mL / NET: 60 mL    09 Apr 2020 07:01  -  09 Apr 2020 16:11  --------------------------------------------------------  IN: 60 mL / OUT: 0 mL / NET: 60 mL      -------------------------------------------------------------------------------------------  EXAM:   GEN: No acute distress; well appearing. AAOx 3  CV: (-) murmur  PULM:  HEENT: PERRL, EOMI, No scleral icterus. Normal mucosa.  GI: soft, non-tender, non-distended.   MSK: No joint deformity.  NEURO: Non-focal.  LYMPH: No lymphadenopathy.  PSY: Mood & affect appropriate.  SKIN: No rashes, ecchymoses, or cyanosis.  -------------------------------------------------------------------------------------------  LABS:                        12.0   7.42  )-----------( 320      ( 09 Apr 2020 06:30 )             37.5       04-09    135  |  96<L>  |  21  ----------------------------<  107<H>  3.7   |  23  |  0.95    Ca    10.0      09 Apr 2020 06:30    TPro  7.5  /  Alb  4.0  /  TBili  0.7  /  DBili  x   /  AST  16  /  ALT  8   /  AlkPhos  60  04-09            S/P hysterectomy  S/P appendectomy     aspirin enteric coated 81 milliGRAM(s) Oral daily  dextrose 40% Gel 15 Gram(s) Oral once PRN  dextrose 5%. 1000 milliLiter(s) IV Continuous <Continuous>  dextrose 50% Injectable 12.5 Gram(s) IV Push once  dextrose 50% Injectable 25 Gram(s) IV Push once  dextrose 50% Injectable 25 Gram(s) IV Push once  enoxaparin Injectable 40 milliGRAM(s) SubCutaneous daily  glucagon  Injectable 1 milliGRAM(s) IntraMuscular once PRN  insulin lispro (HumaLOG) corrective regimen sliding scale   SubCutaneous three times a day before meals  LORazepam   Injectable 2 milliGRAM(s) IV Push every 6 hours PRN  sertraline 50 milliGRAM(s) Oral daily  sodium chloride 0.9%. 1000 milliLiter(s) IV Continuous <Continuous>     No Known Allergies    -------------------------------------------------------------------------------------------  Current Meds:  aspirin enteric coated 81 milliGRAM(s) Oral daily  dextrose 40% Gel 15 Gram(s) Oral once PRN  dextrose 5%. 1000 milliLiter(s) IV Continuous <Continuous>  dextrose 50% Injectable 12.5 Gram(s) IV Push once  dextrose 50% Injectable 25 Gram(s) IV Push once  dextrose 50% Injectable 25 Gram(s) IV Push once  enoxaparin Injectable 40 milliGRAM(s) SubCutaneous daily  glucagon  Injectable 1 milliGRAM(s) IntraMuscular once PRN  insulin lispro (HumaLOG) corrective regimen sliding scale   SubCutaneous three times a day before meals  LORazepam   Injectable 2 milliGRAM(s) IV Push every 6 hours PRN  sertraline 50 milliGRAM(s) Oral daily  sodium chloride 0.9%. 1000 milliLiter(s) IV Continuous <Continuous>    -------------------------------------------------------------------------------------------  Telemetry reviewed. New ECG in chart reviewed. New Echocardiogram in chart reviewed.  New Stress Testing in chart reviewed. New Cardiac Catheterization in chart reviewed. New imaging reviewed.     -------------------------------------------------------------------------------------------  ASSESSMENT AND PLAN:       Reinier Funes  Cardiology Fellow   [phone]     For all Cardiology service contact information, go to [email or web address] and use "[employer]" to login. For all Cardiology service contact information, go to [email or web address] and use "[email or web address]" to login.    HPI:    79F, COVID + , PMHx of dementia, DM, HTN presents to ER for fall and "passing out. Per fausto report patient had several episodes of AMS, with LOC and black stare followed by "body twitching", with bowel and bladder incontinence. The etiology of these events are not clear.     Patient was bradycardic to 40s during orthostatic BP evaluation.   Cardiology was consulted for evaluation of syncope.     PAST MEDICAL & SURGICAL HISTORY:  HTN  Dyslipidemia  Diabetes mellitus type II  S/P hysterectomy  S/P appendectomy    SHx:   -------------------------------------------------------------------------------------------  ROS:  CV: chest pain (-), palpitation (-) PULM: SOB (-), cough (-), wheezing (-), hemoptysis (-). CONST: fever (-), chills (-) or fatigue (-). HEENT:Visual changes (-); vertigo or throat pain (-);  neck stiffness (-). GI: abdominal pain (-) , nausea/vomiting (-), hematemesis, (-)melena (-), hematochezia (-). : dysuria (-), frequency (-), hematuria (-). NEURO: numbness (-), weakness (-). SKIN: itching (-), rash (-)    All other review of systems is negative unless indicated above.   -------------------------------------------------------------------------------------------  VS:  T(C): 36.7 (04-09-20 @ 12:29), Max: 36.7 (04-09-20 @ 12:29)  HR: 69 (04-09-20 @ 12:29) (69 - 75)  BP: 126/65 (04-09-20 @ 12:29) (126/65 - 130/55)  RR: 17 (04-09-20 @ 12:29) (17 - 18)  SpO2: 98% (04-09-20 @ 12:29) (98% - 98%)  I&O's Summary    08 Apr 2020 07:01  -  09 Apr 2020 07:00  --------------------------------------------------------  IN: 60 mL / OUT: 0 mL / NET: 60 mL    09 Apr 2020 07:01  -  09 Apr 2020 16:11  --------------------------------------------------------  IN: 60 mL / OUT: 0 mL / NET: 60 mL  -------------------------------------------------------------------------------------------  EXAM:   GEN: No acute distress; well appearing. AAOx 2  CV: (-) murmur  PULM: CTAB  HEENT: PERRL, EOMI, No scleral icterus. Normal mucosa.  GI: soft, non-tender, non-distended.   MSK: No joint deformity.  NEURO: Non-focal.  LYMPH: No lymphadenopathy.  PSY: Mood & affect appropriate.  SKIN: No rashes, ecchymoses, or cyanosis.  -------------------------------------------------------------------------------------------  LABS:                        12.0   7.42  )-----------( 320      ( 09 Apr 2020 06:30 )             37.5       04-09    135  |  96<L>  |  21  ----------------------------<  107<H>  3.7   |  23  |  0.95    Ca    10.0      09 Apr 2020 06:30    TPro  7.5  /  Alb  4.0  /  TBili  0.7  /  DBili  x   /  AST  16  /  ALT  8   /  AlkPhos  60  04-09            S/P hysterectomy  S/P appendectomy     aspirin enteric coated 81 milliGRAM(s) Oral daily  dextrose 40% Gel 15 Gram(s) Oral once PRN  dextrose 5%. 1000 milliLiter(s) IV Continuous <Continuous>  dextrose 50% Injectable 12.5 Gram(s) IV Push once  dextrose 50% Injectable 25 Gram(s) IV Push once  dextrose 50% Injectable 25 Gram(s) IV Push once  enoxaparin Injectable 40 milliGRAM(s) SubCutaneous daily  glucagon  Injectable 1 milliGRAM(s) IntraMuscular once PRN  insulin lispro (HumaLOG) corrective regimen sliding scale   SubCutaneous three times a day before meals  LORazepam   Injectable 2 milliGRAM(s) IV Push every 6 hours PRN  sertraline 50 milliGRAM(s) Oral daily  sodium chloride 0.9%. 1000 milliLiter(s) IV Continuous <Continuous>     No Known Allergies    -------------------------------------------------------------------------------------------  Current Meds:  aspirin enteric coated 81 milliGRAM(s) Oral daily  dextrose 40% Gel 15 Gram(s) Oral once PRN  dextrose 5%. 1000 milliLiter(s) IV Continuous <Continuous>  dextrose 50% Injectable 12.5 Gram(s) IV Push once  dextrose 50% Injectable 25 Gram(s) IV Push once  dextrose 50% Injectable 25 Gram(s) IV Push once  enoxaparin Injectable 40 milliGRAM(s) SubCutaneous daily  glucagon  Injectable 1 milliGRAM(s) IntraMuscular once PRN  insulin lispro (HumaLOG) corrective regimen sliding scale   SubCutaneous three times a day before meals  LORazepam   Injectable 2 milliGRAM(s) IV Push every 6 hours PRN  sertraline 50 milliGRAM(s) Oral daily  sodium chloride 0.9%. 1000 milliLiter(s) IV Continuous <Continuous>    -------------------------------------------------------------------------------------------  Telemetry reviewed. New ECG in chart reviewed. New Echocardiogram in chart reviewed.  New Stress Testing in chart reviewed. New Cardiac Catheterization in chart reviewed. New imaging reviewed.     -------------------------------------------------------------------------------------------  ASSESSMENT AND PLAN:   79F COVID + , PMHx of dementia, DM, HTN presents to ER for fall and syncope   Do not suspect arrythmia related to ischemic process. Troponin 13, 15.   ECG shows NSR. Telemetry reviewed: bradycardia likely vagally mediated.   Strong suspicion for neurogenic cause, given hx of these episodes followed by post-ictal confusion. No episodes in the hospital, currently on continuous EEG monitoring.   Continue to monitor on telemetry.    Reinier Funes  Cardiology Fellow   [phone]     For all Cardiology service contact information, go to [email or web address] and use "[employer]" to login. For all Cardiology service contact information, go to [email or web address] and use "[email or web address]" to login.    HPI:    79F, COVID + , PMHx of dementia, DM, HTN presents to ER for fall and "passing out. Per fausto report patient had several episodes of AMS, with LOC and black stare followed by "body twitching", with bowel and bladder incontinence. The etiology of these events are not clear.     Patient was bradycardic to 40s during orthostatic BP evaluation.   Cardiology was consulted for evaluation of syncope.     PAST MEDICAL & SURGICAL HISTORY:  HTN  Dyslipidemia  Diabetes mellitus type II  S/P hysterectomy  S/P appendectomy    SHx:   -------------------------------------------------------------------------------------------  ROS:  CV: chest pain (-), palpitation (-) PULM: SOB (-), cough (-), wheezing (-), hemoptysis (-). CONST: fever (-), chills (-) or fatigue (-). HEENT:Visual changes (-); vertigo or throat pain (-);  neck stiffness (-). GI: abdominal pain (-) , nausea/vomiting (-), hematemesis, (-)melena (-), hematochezia (-). : dysuria (-), frequency (-), hematuria (-). NEURO: numbness (-), weakness (-). SKIN: itching (-), rash (-)    All other review of systems is negative unless indicated above.   -------------------------------------------------------------------------------------------  VS:  T(C): 36.7 (04-09-20 @ 12:29), Max: 36.7 (04-09-20 @ 12:29)  HR: 69 (04-09-20 @ 12:29) (69 - 75)  BP: 126/65 (04-09-20 @ 12:29) (126/65 - 130/55)  RR: 17 (04-09-20 @ 12:29) (17 - 18)  SpO2: 98% (04-09-20 @ 12:29) (98% - 98%)  I&O's Summary    08 Apr 2020 07:01  -  09 Apr 2020 07:00  --------------------------------------------------------  IN: 60 mL / OUT: 0 mL / NET: 60 mL    09 Apr 2020 07:01  -  09 Apr 2020 16:11  --------------------------------------------------------  IN: 60 mL / OUT: 0 mL / NET: 60 mL  -------------------------------------------------------------------------------------------  EXAM:   GEN: No acute distress; well appearing. AAOx 2  CV: (-) murmur  PULM: CTAB  HEENT: PERRL, EOMI, No scleral icterus. Normal mucosa.  GI: soft, non-tender, non-distended.   MSK: No joint deformity.  NEURO: Non-focal.  LYMPH: No lymphadenopathy.  PSY: Mood & affect appropriate.  SKIN: No rashes, ecchymoses, or cyanosis.  -------------------------------------------------------------------------------------------  LABS:                        12.0   7.42  )-----------( 320      ( 09 Apr 2020 06:30 )             37.5       04-09    135  |  96<L>  |  21  ----------------------------<  107<H>  3.7   |  23  |  0.95    Ca    10.0      09 Apr 2020 06:30    TPro  7.5  /  Alb  4.0  /  TBili  0.7  /  DBili  x   /  AST  16  /  ALT  8   /  AlkPhos  60  04-09            S/P hysterectomy  S/P appendectomy     aspirin enteric coated 81 milliGRAM(s) Oral daily  dextrose 40% Gel 15 Gram(s) Oral once PRN  dextrose 5%. 1000 milliLiter(s) IV Continuous <Continuous>  dextrose 50% Injectable 12.5 Gram(s) IV Push once  dextrose 50% Injectable 25 Gram(s) IV Push once  dextrose 50% Injectable 25 Gram(s) IV Push once  enoxaparin Injectable 40 milliGRAM(s) SubCutaneous daily  glucagon  Injectable 1 milliGRAM(s) IntraMuscular once PRN  insulin lispro (HumaLOG) corrective regimen sliding scale   SubCutaneous three times a day before meals  LORazepam   Injectable 2 milliGRAM(s) IV Push every 6 hours PRN  sertraline 50 milliGRAM(s) Oral daily  sodium chloride 0.9%. 1000 milliLiter(s) IV Continuous <Continuous>     No Known Allergies    -------------------------------------------------------------------------------------------  Current Meds:  aspirin enteric coated 81 milliGRAM(s) Oral daily  dextrose 40% Gel 15 Gram(s) Oral once PRN  dextrose 5%. 1000 milliLiter(s) IV Continuous <Continuous>  dextrose 50% Injectable 12.5 Gram(s) IV Push once  dextrose 50% Injectable 25 Gram(s) IV Push once  dextrose 50% Injectable 25 Gram(s) IV Push once  enoxaparin Injectable 40 milliGRAM(s) SubCutaneous daily  glucagon  Injectable 1 milliGRAM(s) IntraMuscular once PRN  insulin lispro (HumaLOG) corrective regimen sliding scale   SubCutaneous three times a day before meals  LORazepam   Injectable 2 milliGRAM(s) IV Push every 6 hours PRN  sertraline 50 milliGRAM(s) Oral daily  sodium chloride 0.9%. 1000 milliLiter(s) IV Continuous <Continuous>    -------------------------------------------------------------------------------------------  Telemetry reviewed. New ECG in chart reviewed. New Echocardiogram in chart reviewed.  New Stress Testing in chart reviewed. New Cardiac Catheterization in chart reviewed. New imaging reviewed.     -------------------------------------------------------------------------------------------  ASSESSMENT AND PLAN:   79F COVID + , PMHx of dementia, DM, HTN presents to ER for fall and syncope   Do not suspect arrythmia related to ischemic process. Troponin 13, 15.   ECG shows NSR. Telemetry reviewed: bradycardia likely vagally mediated.   Strong suspicion for neurogenic cause, given hx of these episodes followed by post-ictal confusion. No episodes in the hospital, currently on continuous EEG monitoring.     Plan:   Give 500 cc IVF, repeat and chart orthostatic VS.  Continue to monitor on telemetry.    Reinier Funes  Cardiology Fellow   [phone]     For all Cardiology service contact information, go to [email or web address] and use "[employer]" to login.

## 2020-04-09 NOTE — CONSULT NOTE ADULT - ATTENDING COMMENTS
The patient was seen and examined with the cardiology consultation fellow.  Telemetry was also reviewed.    She is a 79-year-old woman with diabetes, hypertension and dementia who presents after several episodes of syncope.  The patient was unable to recall the circumstances of these episodes or provide additional details.   She is comfortable-appearing and in no acute distress, undergoing EEG.  The patient did not appear grossly volume overloaded.   The murmur of AS was not present.  She is warm and well-perfused.    Labs demonstrate a microcytic anemia, normal renal function, normal hs-troponin and modestly elevated markers of inflammation. SARS-CoV2+  ECG demonstrates sinus rhythm. The WA segment and QRS are normal. There is no evidence of underlying conduction disease on the 12-lead ECG.  Telemetry demonstrates periods of sinus bradycardia, with an episode of sinus pause with a narrow junctional escape.     I agree with the assessment and recommendation noted above.  Telemetry is not suggestive of a high-degree of conduction block or other causes of cardiogenic syncope. No tachyarrhythmia with offset pauses are noted, nor are other malignant arrhythmias. I suspect that given the lack of evidence for underlying conduction disease, that these episodes of bradycardia are related to vagal tone. We are told that the patient had worsening bradycardia during orthostatics testing. This be similar to a cardioinhibitory response seen during tilt table testing. We will review with EP.    Continue telemetry monitoring and supportive care for COVID-19. Agree with continued fluid resuscitation. In the context of acute febrile illness with COVID-19, I do not suspect that further cardiac intervention will be needed.     Haider Sharp MD  Cardiology
Patient seen and examined and above note reviewed in detail and edited and I agree with assessment and plan as outlined. Patient hx as noted and has been having these recurrent episodes that are very suspicious for seizures.  No focality noted on my examination today and so will proceed with 24 hour EEG to detect any abnormalities.  Please also check orthostatic blood pressures twice daily   Continue medical mgt and supportive care and seizure precautions however will not start any anti seizure medications at this point.  All questions answered and she understood plan.

## 2020-04-09 NOTE — CHART NOTE - NSCHARTNOTEFT_GEN_A_CORE
Per RN patient noted to have positive orthostatics today and was symptomatic with lightheadedness which resolved with laying back down on the bed. Also noted with short episodes of transient sinus katy on tele. Case was discussed with Dr. Carlos who recommended hydration with NS 75/hr and he called cardiology to see that patient. Will start IVF and f/u cards recs.

## 2020-04-10 LAB
24R-OH-CALCIDIOL SERPL-MCNC: 64.7 NG/ML — SIGNIFICANT CHANGE UP (ref 30–80)
ALBUMIN SERPL ELPH-MCNC: 3.9 G/DL — SIGNIFICANT CHANGE UP (ref 3.3–5)
ALP SERPL-CCNC: 69 U/L — SIGNIFICANT CHANGE UP (ref 40–120)
ALT FLD-CCNC: 7 U/L — SIGNIFICANT CHANGE UP (ref 4–33)
ANION GAP SERPL CALC-SCNC: 23 MMO/L — HIGH (ref 7–14)
AST SERPL-CCNC: 16 U/L — SIGNIFICANT CHANGE UP (ref 4–32)
BILIRUB SERPL-MCNC: 0.8 MG/DL — SIGNIFICANT CHANGE UP (ref 0.2–1.2)
BUN SERPL-MCNC: 17 MG/DL — SIGNIFICANT CHANGE UP (ref 7–23)
CALCIUM SERPL-MCNC: 10.2 MG/DL — SIGNIFICANT CHANGE UP (ref 8.4–10.5)
CHLORIDE SERPL-SCNC: 102 MMOL/L — SIGNIFICANT CHANGE UP (ref 98–107)
CO2 SERPL-SCNC: 20 MMOL/L — LOW (ref 22–31)
CREAT SERPL-MCNC: 0.68 MG/DL — SIGNIFICANT CHANGE UP (ref 0.5–1.3)
CRP SERPL-MCNC: 29.5 MG/L — HIGH
FERRITIN SERPL-MCNC: 686.7 NG/ML — HIGH (ref 15–150)
GLUCOSE SERPL-MCNC: 71 MG/DL — SIGNIFICANT CHANGE UP (ref 70–99)
HCT VFR BLD CALC: 37.9 % — SIGNIFICANT CHANGE UP (ref 34.5–45)
HGB BLD-MCNC: 12.1 G/DL — SIGNIFICANT CHANGE UP (ref 11.5–15.5)
LDH SERPL L TO P-CCNC: 205 U/L — SIGNIFICANT CHANGE UP (ref 135–225)
MAGNESIUM SERPL-MCNC: 1.9 MG/DL — SIGNIFICANT CHANGE UP (ref 1.6–2.6)
MCHC RBC-ENTMCNC: 25.4 PG — LOW (ref 27–34)
MCHC RBC-ENTMCNC: 31.9 % — LOW (ref 32–36)
MCV RBC AUTO: 79.5 FL — LOW (ref 80–100)
NRBC # FLD: 0 K/UL — SIGNIFICANT CHANGE UP (ref 0–0)
PHOSPHATE SERPL-MCNC: 2.2 MG/DL — LOW (ref 2.5–4.5)
PLATELET # BLD AUTO: 337 K/UL — SIGNIFICANT CHANGE UP (ref 150–400)
PMV BLD: 11.2 FL — SIGNIFICANT CHANGE UP (ref 7–13)
POTASSIUM SERPL-MCNC: 3.9 MMOL/L — SIGNIFICANT CHANGE UP (ref 3.5–5.3)
POTASSIUM SERPL-SCNC: 3.9 MMOL/L — SIGNIFICANT CHANGE UP (ref 3.5–5.3)
PROT SERPL-MCNC: 7.7 G/DL — SIGNIFICANT CHANGE UP (ref 6–8.3)
RBC # BLD: 4.77 M/UL — SIGNIFICANT CHANGE UP (ref 3.8–5.2)
RBC # FLD: 13.2 % — SIGNIFICANT CHANGE UP (ref 10.3–14.5)
SODIUM SERPL-SCNC: 145 MMOL/L — SIGNIFICANT CHANGE UP (ref 135–145)
WBC # BLD: 8.95 K/UL — SIGNIFICANT CHANGE UP (ref 3.8–10.5)
WBC # FLD AUTO: 8.95 K/UL — SIGNIFICANT CHANGE UP (ref 3.8–10.5)

## 2020-04-10 PROCEDURE — 99232 SBSQ HOSP IP/OBS MODERATE 35: CPT | Mod: GC

## 2020-04-10 PROCEDURE — 95720 EEG PHY/QHP EA INCR W/VEEG: CPT

## 2020-04-10 RX ORDER — SODIUM CHLORIDE 9 MG/ML
1000 INJECTION INTRAMUSCULAR; INTRAVENOUS; SUBCUTANEOUS
Refills: 0 | Status: DISCONTINUED | OUTPATIENT
Start: 2020-04-10 | End: 2020-04-11

## 2020-04-10 RX ADMIN — Medication 81 MILLIGRAM(S): at 11:18

## 2020-04-10 RX ADMIN — SODIUM CHLORIDE 75 MILLILITER(S): 9 INJECTION INTRAMUSCULAR; INTRAVENOUS; SUBCUTANEOUS at 02:17

## 2020-04-10 RX ADMIN — ENOXAPARIN SODIUM 40 MILLIGRAM(S): 100 INJECTION SUBCUTANEOUS at 11:18

## 2020-04-10 RX ADMIN — SERTRALINE 50 MILLIGRAM(S): 25 TABLET, FILM COATED ORAL at 11:19

## 2020-04-10 NOTE — CHART NOTE - NSCHARTNOTEFT_GEN_A_CORE
Delayed entry from AM rounds:    So far EEG non-actionable.   ORthostatics POSITIVE might be causative of her syncope, which can manifest with convulsion as well (aka convulsive syncope)    - Can d/c EEG after tomorrow 4/11 EEG report comes in and IF remains stable.  - At that point no further inpatient neurological workup would be warranted     d/w neuro attending Dr. Davis Delayed entry from AM rounds:    So far EEG non-actionable.   ORthostatics POSITIVE might be causative of her syncope, which can manifest with convulsion as well (aka convulsive syncope)    - Can d/c EEG after tomorrow 4/11 EEG report comes in and IF remains stable.  - At that point no further inpatient neurological workup would be warranted     we will sign off, please re-call PRN pager [phone]    d/w neuro attending Dr. Davis Neurology Chart Note:      Delayed entry from AM rounds:    So far EEG non-actionable.     Orthostatics POSITIVE might be causative of her syncope, which can manifest with convulsion as well (aka convulsive syncope)    - Can d/c EEG after tomorrow 4/11 EEG report comes in and IF remains stable.    - At that point no further inpatient neurological workup would be warranted     we will sign off, please re-call PRN pager [phone]    d/w neuro attending Dr. Davis    Thank you for allowing us to participate in the care of this very pleasant patient.

## 2020-04-10 NOTE — PROGRESS NOTE ADULT - ASSESSMENT
80 y/o female PMHx of dementia, DM, HTN presents to ER for multiple complaints. Pt. is relatively poor historian, slow to answer questions, admitted for failure to thrive, poor PO intake, multiple episodes of syncope, followed by "body twitching", with bowel and bladder incontinence, followed by episode of lethargy, blank stare. 	    # Syncope, r/o Seizure vs. infectious etiology  # Ground glass opacities on CT chest  # Dementia  # DM/HTN    Plan:  Syncope, r/o Seizure vs. infectious etiology (Ground glass opacities on CT chest)  Per the daughters, syncopal episodes with body twitching, becoming more frequent this month. 4 episodes total.  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 EEG were okay at that time.   Pt requires help with ADLs including medication administration. AAO x 3 baseline but forgetful.  Now pt with very poor PO intake, sleeping most of the day. no fever no cough.  In ED, pt had multiple CTs, no fractures. Noted ground glass opacities on CT chest, COVID19 ended up (+)  The daughter Aneta was tested positive but she was self isolating at home. no significant interaction per the daughter.   tele monitor, check TTE, trop neg x 2, EKG unimpressive.  appreciate neuro input -> so far EEG not revealing of any seizure-like waveforms  (+)orthostatics, will repeat today, if still (+), will resume gentle IVF    COVID (+): so far 100% on RA, but GCO on CT chest  Holding off plaquenil for now  Will hold off IV antibiotics for now  Daily CRP/ferritin    DM: will hold PO home meds (Janumet 50/100 mg BID)  insulin sliding scale    HTN: BP stable. hold Norvasc 5 mg, hold Lisinopril 20 mg.  will resume after orthostatic (-)    Depression/anxiety: c/w Sertraline 50 mg qdaily    DVT prophylaxis: SQ heparin

## 2020-04-10 NOTE — EEG REPORT - NS EEG TEXT BOX
[facility]   COMPREHENSIVE EPILEPSY CENTER   REPORT OF CONTINUOUS VIDEO EEG     [facility]: [address], [place] [address]  [address]: [id number], [place] [address]  [facility]: [address] [place] [address]    Patient Name: FUNMILAYO AGUILLON  Age and : 79y ([birth year]-44)  MRN #: [id number]  Location: [address]  Referring Physician: Sydnie Leach    Start Time/Date: 15:30 on 20  End Time/Date: 08:00 on 04-10-20    duration 16hrs 30min  _____________________________________________________________  STUDY INFORMATION    EEG Recording Technique:  The patient underwent continuous Video-EEG monitoring, using Telemetry System hardware on the XLTek Digital System. EEG and video data were stored on a computer hard drive with important events saved in digital archive files. The material was reviewed by a physician (electroencephalographer / epileptologist) on a daily basis. Raul and seizure detection algorithms were utilized and reviewed. An EEG Technician attended to the patient, and was available throughout daytime work hours.  The epilepsy center neurologist was available in person or on call 24-hours per day.    EEG Placement and Labeling of Electrodes:  The EEG was performed utilizing 20 channel referential EEG connections (coronal over temporal over parasagittal montage) using all standard 10-20 electrode placements with EKG, with additional electrodes placed in the inferior temporal region using the modified 10-10 montage electrode placements for elective admissions, or if deemed necessary. Recording was at a sampling rate of 256 samples per second per channel. Time synchronized digital video recording was done simultaneously with EEG recording. A low light infrared camera was used for low light recording.     _____________________________________________________________  HISTORY    Patient is a 79y old  Female who presents with a chief complaint of ?syncope (10 Apr 2020 08:20)      PERTINENT MEDICATION:  MEDICATIONS  (STANDING):  aspirin enteric coated 81 milliGRAM(s) Oral daily  dextrose 5%. 1000 milliLiter(s) (50 mL/Hr) IV Continuous <Continuous>  dextrose 50% Injectable 12.5 Gram(s) IV Push once  dextrose 50% Injectable 25 Gram(s) IV Push once  dextrose 50% Injectable 25 Gram(s) IV Push once  enoxaparin Injectable 40 milliGRAM(s) SubCutaneous daily  insulin lispro (HumaLOG) corrective regimen sliding scale   SubCutaneous three times a day before meals  sertraline 50 milliGRAM(s) Oral daily  sodium chloride 0.9%. 1000 milliLiter(s) (75 mL/Hr) IV Continuous <Continuous>      --------------------------------------------------------------------------------------------------  Study Interpretation:    [[[Abbreviation Key:  PDR=alpha rhythm/posterior dominant rhythm. A-P=anterior posterior gradient.  Amplitude: ‘very low’:<20; ‘low’:20-50; ‘medium’:; ‘high’:>200uV.  Persistence for periodic/rhythmic patterns (% of epoch) ‘rare’:<1%; ‘occasional’:1-10%; ‘frequent’:10-50%; ‘abundant’:50-90%; ‘continuous’:>90%.  Persistence for sporadic discharges: ‘rare’:<1/hr; ‘occasional’:1/min-1/hr; ‘frequent’:>1/min; ‘abundant’:>1/10 sec.  GRDA=generalized rhythmic delta activity, LRDA=lateralized rhythmic delta activity, TIRDA=temporal intermittent rhythmic delta activity, FIRDA=frontal intermittent rhythmic activity. LPD=PLED=lateralized periodic discharges, GPD=generalized periodic discharges, BiPDs=BiPLEDs=bilateral independent periodic epileptiform discharges, SIRPID=stimulus induced rhythmic, periodic, or ictal appearing discharges.  Modifiers: +F=with fast component, +S=with spike component, +R=with rhythmic component.  S-B=burst suppression pattern.  Max=maximal. N1-drowsy, N2-stage II sleep, N3-slow wave sleep.  HV=hyperventilation, PS=photic stimulation]]]    FINDINGS:  The background was continuous, spontaneously variable and reactive.  During wakefulness, the posteriorly dominant rhythm consisted of symmetric, well modulated 8.5-9 Hz activity, with an amplitude to 30 uV, that attenuated to eye opening.  Low amplitude central beta was noted in wakefulness.    Background Slowing:  -Intermittent generalized delta slowing    Focal slowing:   -Frontal intermittent rhythmic delta activity    Sleep Background:  Drowsiness was characterized by fragmentation, attenuation, and slowing of the background activity.    Stage II sleep transients were not recorded.    Epileptiform Activity:   No epileptiform discharges were present.    Events:  No clinical events were recorded.  No seizures were recorded.    Activation Procedures:   -Hyperventilation was not performed.    -Photic stimulation was not performed.      Artifacts:  Intermittent myogenic and movement artifacts were noted.    ECG:  The heart rate on single channel ECG at baseline was predominantly near BPM = 60-70  -----------------------------------------------------------------------------------------------------    EEG Classification / Summary:  Abnormal EEG study, awake / drowsy   -Frontal intermittent rhythmic delta activity (FIRDA)  -Intermittent generalized delta slowing    -----------------------------------------------------------------------------------------------------    Clinical Impression:  Mild nonspecific diffuse encephalopathy and subcortical dysfunction.  There were no epileptiform abnormalities recorded.      -------------------------------------------------------------------------------------------------------  Rasta Sharpe DO  Epilepsy Fellow, [facility] Epilepsy [facility]   COMPREHENSIVE EPILEPSY CENTER   REPORT OF CONTINUOUS VIDEO EEG     [facility]: [address], [place] [address]  [phone], [place] [address]  [facility]: [address] [place] [address]    Patient Name: FUNMILAYO AGUILLON  Age and : 79y ([age])  MRN #: [id number]  Location: [address]  Referring Physician: Sydnie Leach    Start Time/Date: 15:30 on 20  End Time/Date: 08:00 on 04-10-20    duration 16hrs 30min  _____________________________________________________________  STUDY INFORMATION    EEG Recording Technique:  The patient underwent continuous Video-EEG monitoring, using Telemetry System hardware on the XLTek Digital System. EEG and video data were stored on a computer hard drive with important events saved in digital archive files. The material was reviewed by a physician (electroencephalographer / epileptologist) on a daily basis. Raul and seizure detection algorithms were utilized and reviewed. An EEG Technician attended to the patient, and was available throughout daytime work hours.  The epilepsy center neurologist was available in person or on call 24-hours per day.    EEG Placement and Labeling of Electrodes:  The EEG was performed utilizing 20 channel referential EEG connections (coronal over temporal over parasagittal montage) using all standard 10-20 electrode placements with EKG, with additional electrodes placed in the inferior temporal region using the modified 10-10 montage electrode placements for elective admissions, or if deemed necessary. Recording was at a sampling rate of 256 samples per second per channel. Time synchronized digital video recording was done simultaneously with EEG recording. A low light infrared camera was used for low light recording.     _____________________________________________________________  HISTORY    Patient is a 79y old  Female who presents with a chief complaint of ?syncope (10 Apr 2020 08:20)      PERTINENT MEDICATION:  MEDICATIONS  (STANDING):  aspirin enteric coated 81 milliGRAM(s) Oral daily  dextrose 5%. 1000 milliLiter(s) (50 mL/Hr) IV Continuous <Continuous>  dextrose 50% Injectable 12.5 Gram(s) IV Push once  dextrose 50% Injectable 25 Gram(s) IV Push once  dextrose 50% Injectable 25 Gram(s) IV Push once  enoxaparin Injectable 40 milliGRAM(s) SubCutaneous daily  insulin lispro (HumaLOG) corrective regimen sliding scale   SubCutaneous three times a day before meals  sertraline 50 milliGRAM(s) Oral daily  sodium chloride 0.9%. 1000 milliLiter(s) (75 mL/Hr) IV Continuous <Continuous>      --------------------------------------------------------------------------------------------------  Study Interpretation:    [[[Abbreviation Key:  PDR=alpha rhythm/posterior dominant rhythm. A-P=anterior posterior gradient.  Amplitude: ‘very low’:<20; ‘low’:20-50; ‘medium’:; ‘high’:>200uV.  Persistence for periodic/rhythmic patterns (% of epoch) ‘rare’:<1%; ‘occasional’:1-10%; ‘frequent’:10-50%; ‘abundant’:50-90%; ‘continuous’:>90%.  Persistence for sporadic discharges: ‘rare’:<1/hr; ‘occasional’:1/min-1/hr; ‘frequent’:>1/min; ‘abundant’:>1/10 sec.  GRDA=generalized rhythmic delta activity, LRDA=lateralized rhythmic delta activity, TIRDA=temporal intermittent rhythmic delta activity, FIRDA=frontal intermittent rhythmic activity. LPD=PLED=lateralized periodic discharges, GPD=generalized periodic discharges, BiPDs=BiPLEDs=bilateral independent periodic epileptiform discharges, SIRPID=stimulus induced rhythmic, periodic, or ictal appearing discharges.  Modifiers: +F=with fast component, +S=with spike component, +R=with rhythmic component.  S-B=burst suppression pattern.  Max=maximal. N1-drowsy, N2-stage II sleep, N3-slow wave sleep.  HV=hyperventilation, PS=photic stimulation]]]    FINDINGS:  The background was continuous, spontaneously variable and reactive.  During wakefulness, the posteriorly dominant rhythm consisted of symmetric, well modulated 8.5-9 Hz activity, with an amplitude to 30 uV, that attenuated to eye opening.  Low amplitude central beta was noted in wakefulness.    Background Slowing:  -Intermittent generalized delta slowing    Focal slowing:   -Frontal intermittent rhythmic delta activity    Sleep Background:  Drowsiness was characterized by fragmentation, attenuation, and slowing of the background activity.    Stage II sleep transients were not recorded.    Epileptiform Activity:   No epileptiform discharges were present.    Events:  No clinical events were recorded.  No seizures were recorded.    Activation Procedures:   -Hyperventilation was not performed.    -Photic stimulation was not performed.      Artifacts:  Intermittent myogenic and movement artifacts were noted.    ECG:  The heart rate on single channel ECG at baseline was predominantly near BPM = 60-70  -----------------------------------------------------------------------------------------------------    EEG Classification / Summary:  Abnormal EEG study, awake / drowsy   -Frontal intermittent rhythmic delta activity (FIRDA)  -Intermittent generalized delta slowing    -----------------------------------------------------------------------------------------------------    Clinical Impression:  Mild nonspecific diffuse encephalopathy and subcortical dysfunction.  There were no epileptiform abnormalities recorded.      -------------------------------------------------------------------------------------------------------  Rasta Sharpe DO  Epilepsy Fellow, [facility]    Vaughn Lakahni MD  EEG / Epilepsy Attending Physician

## 2020-04-10 NOTE — PROGRESS NOTE ADULT - ASSESSMENT
For all Cardiology service contact information, go to [email or web address] and use "[email or web address]" to login.    SUBJECTIVE:   Chart and Telemetry reviewed.  -------------------------------------------------------------------------------------------  -------------------------------------------------------------------------------------------  VS:  T(F): 98.2 (04-09), Max: 98.2 (04-09)  HR: 49 (04-10) (49 - 69)  BP: 145/53 (04-09) (126/65 - 145/53)  RR: 18 (04-09)  SpO2: 97% (04-09)  I&O's Summary    09 Apr 2020 07:01  -  10 Apr 2020 07:00  --------------------------------------------------------  IN: 60 mL / OUT: 0 mL / NET: 60 mL      -------------------------------------------------------------------------------------------  EXAM:   As per recent guidelines to minimize exposure to provider and staff, please use inpatient primary provider progress note exam for full exam.  Additional exam findings if performed are as noted below:     -------------------------------------------------------------------------------------------  LABS:                        12.0   7.42  )-----------( 320      ( 09 Apr 2020 06:30 )             37.5       04-09    135  |  96<L>  |  21  ----------------------------<  107<H>  3.7   |  23  |  0.95    Ca    10.0      09 Apr 2020 06:30    TPro  7.5  /  Alb  4.0  /  TBili  0.7  /  DBili  x   /  AST  16  /  ALT  8   /  AlkPhos  60  04-09                    -------------------------------------------------------------------------------------------  Meds:  aspirin enteric coated 81 milliGRAM(s) Oral daily  dextrose 40% Gel 15 Gram(s) Oral once PRN  dextrose 5%. 1000 milliLiter(s) IV Continuous <Continuous>  dextrose 50% Injectable 12.5 Gram(s) IV Push once  dextrose 50% Injectable 25 Gram(s) IV Push once  dextrose 50% Injectable 25 Gram(s) IV Push once  enoxaparin Injectable 40 milliGRAM(s) SubCutaneous daily  glucagon  Injectable 1 milliGRAM(s) IntraMuscular once PRN  insulin lispro (HumaLOG) corrective regimen sliding scale   SubCutaneous three times a day before meals  LORazepam   Injectable 2 milliGRAM(s) IV Push every 6 hours PRN  sertraline 50 milliGRAM(s) Oral daily  sodium chloride 0.9%. 1000 milliLiter(s) IV Continuous <Continuous>    -------------------------------------------------------------------------------------------  Telemetry reviewed. New ECG in chart reviewed. New Echocardiogram in chart reviewed.  New Stress Testing in chart reviewed. New Cardiac Catheterization in chart chart reviewed. New imaging reviewed.   -------------------------------------------------------------------------------------------  ASSESSMENT AND PLAN:     79F COVID + , PMHx of dementia, DM, HTN presents to ER for fall and syncope   Do not suspect arrythmia related to ischemic process. Troponin 13, 15.   ECG shows NSR. Telemetry reviewed: bradycardia likely vagally mediated.   Strong suspicion for neurogenic cause, given hx of these episodes followed by post-ictal confusion. No episodes in the hospital, currently on continuous EEG monitoring.     Plan:   Give 500 cc IVF, repeat and chart orthostatic VS.  Continue to monitor on telemetry.    Reinier Funes  Cardiology Fellow   [phone]     For all Cardiology service contact information, go to [email or web address] and use "[employer]" to login. For all Cardiology service contact information, go to [email or web address] and use "[email or web address]" to login.    SUBJECTIVE:   Chart and Telemetry reviewed. Sinus bradycardia 40-50s. Asymptomatic.   -------------------------------------------------------------------------------------------  -------------------------------------------------------------------------------------------  VS:  T(F): 98.2 (04-09), Max: 98.2 (04-09)  HR: 49 (04-10) (49 - 69)  BP: 145/53 (04-09) (126/65 - 145/53)  RR: 18 (04-09)  SpO2: 97% (04-09)  I&O's Summary    09 Apr 2020 07:01  -  10 Apr 2020 07:00  --------------------------------------------------------  IN: 60 mL / OUT: 0 mL / NET: 60 mL      -------------------------------------------------------------------------------------------  EXAM:   As per recent guidelines to minimize exposure to provider and staff, please use inpatient primary provider progress note exam for full exam.  Additional exam findings if performed are as noted below:     -------------------------------------------------------------------------------------------  LABS:                        12.0   7.42  )-----------( 320      ( 09 Apr 2020 06:30 )             37.5       04-09    135  |  96<L>  |  21  ----------------------------<  107<H>  3.7   |  23  |  0.95    Ca    10.0      09 Apr 2020 06:30    TPro  7.5  /  Alb  4.0  /  TBili  0.7  /  DBili  x   /  AST  16  /  ALT  8   /  AlkPhos  60  04-09                    -------------------------------------------------------------------------------------------  Meds:  aspirin enteric coated 81 milliGRAM(s) Oral daily  dextrose 40% Gel 15 Gram(s) Oral once PRN  dextrose 5%. 1000 milliLiter(s) IV Continuous <Continuous>  dextrose 50% Injectable 12.5 Gram(s) IV Push once  dextrose 50% Injectable 25 Gram(s) IV Push once  dextrose 50% Injectable 25 Gram(s) IV Push once  enoxaparin Injectable 40 milliGRAM(s) SubCutaneous daily  glucagon  Injectable 1 milliGRAM(s) IntraMuscular once PRN  insulin lispro (HumaLOG) corrective regimen sliding scale   SubCutaneous three times a day before meals  LORazepam   Injectable 2 milliGRAM(s) IV Push every 6 hours PRN  sertraline 50 milliGRAM(s) Oral daily  sodium chloride 0.9%. 1000 milliLiter(s) IV Continuous <Continuous>    -------------------------------------------------------------------------------------------  Telemetry reviewed. New ECG in chart reviewed. New Echocardiogram in chart reviewed.  New Stress Testing in chart reviewed. New Cardiac Catheterization in chart chart reviewed. New imaging reviewed.   -------------------------------------------------------------------------------------------  ASSESSMENT AND PLAN:     79F COVID + , PMHx of dementia, DM, HTN presents to ER for fall and   Telemetry reviewed: bradycardia likely vagally mediated. No evidence of conduction system disease or malignant arrhythmias.  Encourage hydration. Follow up neurology recommendations.   Would not recommend any further workup from a cardiology standpoint.       Reinier Funes  Cardiology Fellow   [phone]     For all Cardiology service contact information, go to [email or web address] and use "[email or web address]" to login. For all Cardiology service contact information, go to [email or web address] and use "[employer]" to login.    SUBJECTIVE:   Chart and Telemetry reviewed. Sinus bradycardia 40-50s. Asymptomatic.   -------------------------------------------------------------------------------------------  -------------------------------------------------------------------------------------------  VS:  T(F): 98.2 (04-09), Max: 98.2 (04-09)  HR: 49 (04-10) (49 - 69)  BP: 145/53 (04-09) (126/65 - 145/53)  RR: 18 (04-09)  SpO2: 97% (04-09)  I&O's Summary    09 Apr 2020 07:01  -  10 Apr 2020 07:00  --------------------------------------------------------  IN: 60 mL / OUT: 0 mL / NET: 60 mL      -------------------------------------------------------------------------------------------  EXAM:   As per recent guidelines to minimize exposure to provider and staff, please use inpatient primary provider progress note exam for full exam.  Additional exam findings if performed are as noted below:     -------------------------------------------------------------------------------------------  LABS:                        12.0   7.42  )-----------( 320      ( 09 Apr 2020 06:30 )             37.5       04-09    135  |  96<L>  |  21  ----------------------------<  107<H>  3.7   |  23  |  0.95    Ca    10.0      09 Apr 2020 06:30    TPro  7.5  /  Alb  4.0  /  TBili  0.7  /  DBili  x   /  AST  16  /  ALT  8   /  AlkPhos  60  04-09                    -------------------------------------------------------------------------------------------  Meds:  aspirin enteric coated 81 milliGRAM(s) Oral daily  dextrose 40% Gel 15 Gram(s) Oral once PRN  dextrose 5%. 1000 milliLiter(s) IV Continuous <Continuous>  dextrose 50% Injectable 12.5 Gram(s) IV Push once  dextrose 50% Injectable 25 Gram(s) IV Push once  dextrose 50% Injectable 25 Gram(s) IV Push once  enoxaparin Injectable 40 milliGRAM(s) SubCutaneous daily  glucagon  Injectable 1 milliGRAM(s) IntraMuscular once PRN  insulin lispro (HumaLOG) corrective regimen sliding scale   SubCutaneous three times a day before meals  LORazepam   Injectable 2 milliGRAM(s) IV Push every 6 hours PRN  sertraline 50 milliGRAM(s) Oral daily  sodium chloride 0.9%. 1000 milliLiter(s) IV Continuous <Continuous>    -------------------------------------------------------------------------------------------  Telemetry reviewed. New ECG in chart reviewed. New Echocardiogram in chart reviewed.  New Stress Testing in chart reviewed. New Cardiac Catheterization in chart chart reviewed. New imaging reviewed.   -------------------------------------------------------------------------------------------  ASSESSMENT AND PLAN:     79F COVID + , PMHx of dementia, DM, HTN presents to ER for fall and   Telemetry reviewed: bradycardia likely vagally mediated. No evidence of conduction system disease or malignant arrhythmias.  Encourage hydration. Follow up neurology recommendations.   Would not recommend any further workup from a cardiology standpoint.     We will sign off at this time. Please reconsult as needed.     Reinier Funes  Cardiology Fellow   [phone]     For all Cardiology service contact information, go to [email or web address] and use "[email or web address]" to login.

## 2020-04-11 LAB — COPPER SERPL-MCNC: 132 UG/DL — SIGNIFICANT CHANGE UP (ref 72–166)

## 2020-04-11 PROCEDURE — 95720 EEG PHY/QHP EA INCR W/VEEG: CPT

## 2020-04-11 PROCEDURE — 93010 ELECTROCARDIOGRAM REPORT: CPT | Mod: 76

## 2020-04-11 PROCEDURE — 95718 EEG PHYS/QHP 2-12 HR W/VEEG: CPT

## 2020-04-11 RX ORDER — AMLODIPINE BESYLATE 2.5 MG/1
5 TABLET ORAL DAILY
Refills: 0 | Status: DISCONTINUED | OUTPATIENT
Start: 2020-04-11 | End: 2020-04-15

## 2020-04-11 RX ADMIN — Medication 81 MILLIGRAM(S): at 11:05

## 2020-04-11 RX ADMIN — AMLODIPINE BESYLATE 5 MILLIGRAM(S): 2.5 TABLET ORAL at 16:54

## 2020-04-11 RX ADMIN — SERTRALINE 50 MILLIGRAM(S): 25 TABLET, FILM COATED ORAL at 11:05

## 2020-04-11 RX ADMIN — ENOXAPARIN SODIUM 40 MILLIGRAM(S): 100 INJECTION SUBCUTANEOUS at 11:05

## 2020-04-11 NOTE — EEG REPORT - NS EEG TEXT BOX
[facility]   COMPREHENSIVE EPILEPSY CENTER   REPORT OF CONTINUOUS VIDEO EEG     [facility]: [address], [place] [address]  [address]: [phone], [place] [address]  [facility]: [address] [place] [address]    Patient Name: FUNMILAYO AGUILLON  Age and : 79y ([id number])  MRN #: [id number]  Location: [address]  Referring Physician: Sydnie Leach    Start Time/Date: 08:00 on 04-10-20  End Time/Date: 08:00 on 20    duration 16hrs 30min  _____________________________________________________________  STUDY INFORMATION    EEG Recording Technique:  The patient underwent continuous Video-EEG monitoring, using Telemetry System hardware on the XLTek Digital System. EEG and video data were stored on a computer hard drive with important events saved in digital archive files. The material was reviewed by a physician (electroencephalographer / epileptologist) on a daily basis. Raul and seizure detection algorithms were utilized and reviewed. An EEG Technician attended to the patient, and was available throughout daytime work hours.  The epilepsy center neurologist was available in person or on call 24-hours per day.    EEG Placement and Labeling of Electrodes:  The EEG was performed utilizing 20 channel referential EEG connections (coronal over temporal over parasagittal montage) using all standard 10-20 electrode placements with EKG, with additional electrodes placed in the inferior temporal region using the modified 10-10 montage electrode placements for elective admissions, or if deemed necessary. Recording was at a sampling rate of 256 samples per second per channel. Time synchronized digital video recording was done simultaneously with EEG recording. A low light infrared camera was used for low light recording.     _____________________________________________________________  HISTORY    Patient is a 79y old  Female who presents with a chief complaint of ?syncope (10 Apr 2020 08:20)      PERTINENT MEDICATION:  MEDICATIONS  (STANDING):  aspirin enteric coated 81 milliGRAM(s) Oral daily  dextrose 5%. 1000 milliLiter(s) (50 mL/Hr) IV Continuous <Continuous>  dextrose 50% Injectable 12.5 Gram(s) IV Push once  dextrose 50% Injectable 25 Gram(s) IV Push once  dextrose 50% Injectable 25 Gram(s) IV Push once  enoxaparin Injectable 40 milliGRAM(s) SubCutaneous daily  insulin lispro (HumaLOG) corrective regimen sliding scale   SubCutaneous three times a day before meals  sertraline 50 milliGRAM(s) Oral daily  sodium chloride 0.9%. 1000 milliLiter(s) (75 mL/Hr) IV Continuous <Continuous>      --------------------------------------------------------------------------------------------------  Study Interpretation:    [[[Abbreviation Key:  PDR=alpha rhythm/posterior dominant rhythm. A-P=anterior posterior gradient.  Amplitude: ‘very low’:<20; ‘low’:20-50; ‘medium’:; ‘high’:>200uV.  Persistence for periodic/rhythmic patterns (% of epoch) ‘rare’:<1%; ‘occasional’:1-10%; ‘frequent’:10-50%; ‘abundant’:50-90%; ‘continuous’:>90%.  Persistence for sporadic discharges: ‘rare’:<1/hr; ‘occasional’:1/min-1/hr; ‘frequent’:>1/min; ‘abundant’:>1/10 sec.  GRDA=generalized rhythmic delta activity, LRDA=lateralized rhythmic delta activity, TIRDA=temporal intermittent rhythmic delta activity, FIRDA=frontal intermittent rhythmic activity. LPD=PLED=lateralized periodic discharges, GPD=generalized periodic discharges, BiPDs=BiPLEDs=bilateral independent periodic epileptiform discharges, SIRPID=stimulus induced rhythmic, periodic, or ictal appearing discharges.  Modifiers: +F=with fast component, +S=with spike component, +R=with rhythmic component.  S-B=burst suppression pattern.  Max=maximal. N1-drowsy, N2-stage II sleep, N3-slow wave sleep.  HV=hyperventilation, PS=photic stimulation]]]    FINDINGS:  The background was continuous, spontaneously variable and reactive.  During wakefulness, the posteriorly dominant rhythm consisted of symmetric, well modulated 8.5-9 Hz activity, with an amplitude to 30 uV, that attenuated to eye opening.  Low amplitude central beta was noted in wakefulness.    Background Slowing:  -Intermittent irregular theta activity activity.    Focal slowing:   -Frontal intermittent rhythmic delta activity    Sleep Background:  Drowsiness was characterized by fragmentation, attenuation, and slowing of the background activity.    Stage II sleep transients were noted by rudimentary sleep spindles.    Epileptiform Activity:   No epileptiform discharges were present.    Events:  No clinical events were recorded.  No seizures were recorded.    Activation Procedures:   -Hyperventilation was not performed.    -Photic stimulation was not performed.      Artifacts:  Intermittent myogenic and movement artifacts were noted.    ECG:  The heart rate on single channel ECG at baseline was predominantly near BPM = 60-70  -----------------------------------------------------------------------------------------------------    EEG Classification / Summary:  Abnormal EEG study, awake / drowsy   --Intermittent generalized slowing    -----------------------------------------------------------------------------------------------------    Clinical Impression:  Mild nonspecific diffuse encephalopathy and subcortical dysfunction.  There were no epileptiform abnormalities recorded.      Felipe Tracey MD  EEG / Epilepsy Attending Physician

## 2020-04-11 NOTE — PROGRESS NOTE ADULT - ASSESSMENT
78 y/o female PMHx of dementia, DM, HTN presents to ER for multiple complaints. Pt. is relatively poor historian, slow to answer questions, admitted for failure to thrive, poor PO intake, multiple episodes of syncope, followed by "body twitching", with bowel and bladder incontinence, followed by episode of lethargy, blank stare. 	    # Syncope, r/o Seizure vs. infectious etiology  # Ground glass opacities on CT chest  # Dementia  # DM/HTN    Plan:  Syncope >> seizure >> cardiac event  Per the daughters, syncopal episodes with body twitching, becoming more frequent this month. 4 episodes total.  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 EEG were okay at that time.   Pt requires help with ADLs including medication administration. AAO x 3 baseline but forgetful.  Now pt with very poor PO intake, sleeping most of the day. no fever no cough.  In ED, pt had multiple CTs, no fractures. Noted ground glass opacities on CT chest, COVID19 ended up (+)  The daughter Aneta was tested positive but she was self isolating at home. no significant interaction per the daughter.   tele monitor, check TTE, trop neg x 2, EKG unimpressive.  appreciate neuro input -> so far EEG not revealing of any seizure-like waveforms -> stopped EEG 4/11/20  no further neuro inpt workup  No longer orthostatic, stopped IVF 4/11/20    COVID (+): so far 100% on RA, but GCO on CT chest  Holding off plaquenil for now  Will hold off IV antibiotics for now  Daily CRP/ferritin    DM: will hold PO home meds (Janumet 50/100 mg BID)  insulin sliding scale    HTN: resumed Norvasc 5 mg 4/11/20,  cont to hold Lisinopril 20 mg    Depression/anxiety: c/w Sertraline 50 mg qdaily    DVT prophylaxis: SQ heparin

## 2020-04-11 NOTE — EEG REPORT - NS EEG TEXT BOX
[facility]   COMPREHENSIVE EPILEPSY CENTER   REPORT OF CONTINUOUS VIDEO EEG     [facility]: [address], [place] [address]  [address]: [phone], [place] [address]  [facility]: [address] [place] [address]    Patient Name: FUNMILAYO AGUILLON  Age and : 79y ([id number])  MRN #: [id number]  Location: [address]  Referring Physician: Sydnie Leach    Start Time/Date: 08:00 on 20  End Time/Date: 14:33 on 20    duration 6hrs 33min  _____________________________________________________________  STUDY INFORMATION    EEG Recording Technique:  The patient underwent continuous Video-EEG monitoring, using Telemetry System hardware on the XLTek Digital System. EEG and video data were stored on a computer hard drive with important events saved in digital archive files. The material was reviewed by a physician (electroencephalographer / epileptologist) on a daily basis. Raul and seizure detection algorithms were utilized and reviewed. An EEG Technician attended to the patient, and was available throughout daytime work hours.  The epilepsy center neurologist was available in person or on call 24-hours per day.    EEG Placement and Labeling of Electrodes:  The EEG was performed utilizing 20 channel referential EEG connections (coronal over temporal over parasagittal montage) using all standard 10-20 electrode placements with EKG, with additional electrodes placed in the inferior temporal region using the modified 10-10 montage electrode placements for elective admissions, or if deemed necessary. Recording was at a sampling rate of 256 samples per second per channel. Time synchronized digital video recording was done simultaneously with EEG recording. A low light infrared camera was used for low light recording.     _____________________________________________________________  HISTORY    Patient is a 79y old  Female who presents with a chief complaint of ?syncope (10 Apr 2020 08:20)      PERTINENT MEDICATION:  MEDICATIONS  (STANDING):  aspirin enteric coated 81 milliGRAM(s) Oral daily  dextrose 5%. 1000 milliLiter(s) (50 mL/Hr) IV Continuous <Continuous>  dextrose 50% Injectable 12.5 Gram(s) IV Push once  dextrose 50% Injectable 25 Gram(s) IV Push once  dextrose 50% Injectable 25 Gram(s) IV Push once  enoxaparin Injectable 40 milliGRAM(s) SubCutaneous daily  insulin lispro (HumaLOG) corrective regimen sliding scale   SubCutaneous three times a day before meals  sertraline 50 milliGRAM(s) Oral daily  sodium chloride 0.9%. 1000 milliLiter(s) (75 mL/Hr) IV Continuous <Continuous>      --------------------------------------------------------------------------------------------------  Study Interpretation:    [[[Abbreviation Key:  PDR=alpha rhythm/posterior dominant rhythm. A-P=anterior posterior gradient.  Amplitude: ‘very low’:<20; ‘low’:20-50; ‘medium’:; ‘high’:>200uV.  Persistence for periodic/rhythmic patterns (% of epoch) ‘rare’:<1%; ‘occasional’:1-10%; ‘frequent’:10-50%; ‘abundant’:50-90%; ‘continuous’:>90%.  Persistence for sporadic discharges: ‘rare’:<1/hr; ‘occasional’:1/min-1/hr; ‘frequent’:>1/min; ‘abundant’:>1/10 sec.  GRDA=generalized rhythmic delta activity, LRDA=lateralized rhythmic delta activity, TIRDA=temporal intermittent rhythmic delta activity, FIRDA=frontal intermittent rhythmic activity. LPD=PLED=lateralized periodic discharges, GPD=generalized periodic discharges, BiPDs=BiPLEDs=bilateral independent periodic epileptiform discharges, SIRPID=stimulus induced rhythmic, periodic, or ictal appearing discharges.  Modifiers: +F=with fast component, +S=with spike component, +R=with rhythmic component.  S-B=burst suppression pattern.  Max=maximal. N1-drowsy, N2-stage II sleep, N3-slow wave sleep.  HV=hyperventilation, PS=photic stimulation]]]    FINDINGS:  The background was continuous, spontaneously variable and reactive.  During wakefulness, the posteriorly dominant rhythm consisted of symmetric, well modulated 8.5-9 Hz activity, with an amplitude to 30 uV, that attenuated to eye opening.  Low amplitude central beta was noted in wakefulness.    Background Slowing:  -Intermittent irregular theta activity activity.    Focal slowing:   -Frontal intermittent rhythmic delta activity    Sleep Background:  Drowsiness was characterized by fragmentation, attenuation, and slowing of the background activity.    Stage II sleep transients were noted by rudimentary sleep spindles.    Epileptiform Activity:   No epileptiform discharges were present.    Events:  No clinical events were recorded.  No seizures were recorded.    Activation Procedures:   -Hyperventilation was not performed.    -Photic stimulation was not performed.      Artifacts:  Intermittent myogenic and movement artifacts were noted.    ECG:  The heart rate on single channel ECG at baseline was predominantly near BPM = 60-70  -----------------------------------------------------------------------------------------------------    EEG Classification / Summary:  Abnormal EEG study, awake / drowsy   --Intermittent generalized slowing    -----------------------------------------------------------------------------------------------------    Clinical Impression:  Mild nonspecific diffuse encephalopathy and subcortical dysfunction.  There were no epileptiform abnormalities recorded.      Felipe Tracey MD  EEG / Epilepsy Attending Physician

## 2020-04-12 LAB
ALBUMIN SERPL ELPH-MCNC: 3.4 G/DL — SIGNIFICANT CHANGE UP (ref 3.3–5)
ALP SERPL-CCNC: 71 U/L — SIGNIFICANT CHANGE UP (ref 40–120)
ALT FLD-CCNC: 8 U/L — SIGNIFICANT CHANGE UP (ref 4–33)
ANION GAP SERPL CALC-SCNC: 17 MMO/L — HIGH (ref 7–14)
AST SERPL-CCNC: 13 U/L — SIGNIFICANT CHANGE UP (ref 4–32)
BILIRUB SERPL-MCNC: 0.7 MG/DL — SIGNIFICANT CHANGE UP (ref 0.2–1.2)
BUN SERPL-MCNC: 11 MG/DL — SIGNIFICANT CHANGE UP (ref 7–23)
CALCIUM SERPL-MCNC: 10.2 MG/DL — SIGNIFICANT CHANGE UP (ref 8.4–10.5)
CHLORIDE SERPL-SCNC: 99 MMOL/L — SIGNIFICANT CHANGE UP (ref 98–107)
CO2 SERPL-SCNC: 21 MMOL/L — LOW (ref 22–31)
CREAT SERPL-MCNC: 0.61 MG/DL — SIGNIFICANT CHANGE UP (ref 0.5–1.3)
GLUCOSE SERPL-MCNC: 71 MG/DL — SIGNIFICANT CHANGE UP (ref 70–99)
HCT VFR BLD CALC: 37.2 % — SIGNIFICANT CHANGE UP (ref 34.5–45)
HGB BLD-MCNC: 11.9 G/DL — SIGNIFICANT CHANGE UP (ref 11.5–15.5)
MCHC RBC-ENTMCNC: 25.3 PG — LOW (ref 27–34)
MCHC RBC-ENTMCNC: 32 % — SIGNIFICANT CHANGE UP (ref 32–36)
MCV RBC AUTO: 79.1 FL — LOW (ref 80–100)
NRBC # FLD: 0 K/UL — SIGNIFICANT CHANGE UP (ref 0–0)
PLATELET # BLD AUTO: 396 K/UL — SIGNIFICANT CHANGE UP (ref 150–400)
PMV BLD: 11 FL — SIGNIFICANT CHANGE UP (ref 7–13)
POTASSIUM SERPL-MCNC: 3.3 MMOL/L — LOW (ref 3.5–5.3)
POTASSIUM SERPL-SCNC: 3.3 MMOL/L — LOW (ref 3.5–5.3)
PROT SERPL-MCNC: 7.7 G/DL — SIGNIFICANT CHANGE UP (ref 6–8.3)
RBC # BLD: 4.7 M/UL — SIGNIFICANT CHANGE UP (ref 3.8–5.2)
RBC # FLD: 13.2 % — SIGNIFICANT CHANGE UP (ref 10.3–14.5)
SODIUM SERPL-SCNC: 137 MMOL/L — SIGNIFICANT CHANGE UP (ref 135–145)
WBC # BLD: 7.96 K/UL — SIGNIFICANT CHANGE UP (ref 3.8–10.5)
WBC # FLD AUTO: 7.96 K/UL — SIGNIFICANT CHANGE UP (ref 3.8–10.5)

## 2020-04-12 RX ORDER — POTASSIUM CHLORIDE 20 MEQ
40 PACKET (EA) ORAL ONCE
Refills: 0 | Status: COMPLETED | OUTPATIENT
Start: 2020-04-12 | End: 2020-04-12

## 2020-04-12 RX ADMIN — Medication 81 MILLIGRAM(S): at 09:07

## 2020-04-12 RX ADMIN — Medication 40 MILLIEQUIVALENT(S): at 11:03

## 2020-04-12 RX ADMIN — SERTRALINE 50 MILLIGRAM(S): 25 TABLET, FILM COATED ORAL at 09:07

## 2020-04-12 RX ADMIN — ENOXAPARIN SODIUM 40 MILLIGRAM(S): 100 INJECTION SUBCUTANEOUS at 09:07

## 2020-04-12 RX ADMIN — AMLODIPINE BESYLATE 5 MILLIGRAM(S): 2.5 TABLET ORAL at 04:55

## 2020-04-12 NOTE — PROGRESS NOTE ADULT - ASSESSMENT
80 y/o female PMHx of dementia, DM, HTN presents to ER for multiple complaints. Pt. is relatively poor historian, slow to answer questions, admitted for failure to thrive, poor PO intake, multiple episodes of syncope, followed by "body twitching", with bowel and bladder incontinence, followed by episode of lethargy, blank stare. 	    # Syncope, r/o Seizure vs. infectious etiology  # Ground glass opacities on CT chest  # Dementia  # DM/HTN    Plan:  Syncope >> seizure >> cardiac event  Per the daughters, syncopal episodes with body twitching, becoming more frequent this month. 4 episodes total.  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 EEG were okay at that time.   Pt requires help with ADLs including medication administration. AAO x 3 baseline but forgetful.  Now pt with very poor PO intake, sleeping most of the day. no fever no cough.  In ED, pt had multiple CTs, no fractures. Noted ground glass opacities on CT chest, COVID19 ended up (+)  The daughter Aneta was tested positive but she was self isolating at home. no significant interaction per the daughter.   tele monitor, check TTE, trop neg x 2, EKG unimpressive.  appreciate neuro input -> so far EEG not revealing of any seizure-like waveforms -> stopped EEG 4/11/20  no further neuro inpt workup  No longer orthostatic, stopped IVF 4/11/20    COVID (+): so far 100% on RA, but GCO on CT chest  Holding off plaquenil for now  Will hold off IV antibiotics for now  Daily CRP/ferritin    DM: will hold PO home meds (Janumet 50/100 mg BID)  insulin sliding scale    HTN: resumed Norvasc 5 mg 4/11/20,  cont to hold Lisinopril 20 mg    Depression/anxiety: c/w Sertraline 50 mg qdaily    DVT prophylaxis: SQ heparin

## 2020-04-13 LAB
ANION GAP SERPL CALC-SCNC: 14 MMO/L — SIGNIFICANT CHANGE UP (ref 7–14)
BUN SERPL-MCNC: 14 MG/DL — SIGNIFICANT CHANGE UP (ref 7–23)
CALCIUM SERPL-MCNC: 10.2 MG/DL — SIGNIFICANT CHANGE UP (ref 8.4–10.5)
CHLORIDE SERPL-SCNC: 98 MMOL/L — SIGNIFICANT CHANGE UP (ref 98–107)
CO2 SERPL-SCNC: 24 MMOL/L — SIGNIFICANT CHANGE UP (ref 22–31)
CREAT SERPL-MCNC: 0.74 MG/DL — SIGNIFICANT CHANGE UP (ref 0.5–1.3)
CRP SERPL-MCNC: 28.7 MG/L — HIGH
FERRITIN SERPL-MCNC: 624.7 NG/ML — HIGH (ref 15–150)
GLUCOSE SERPL-MCNC: 80 MG/DL — SIGNIFICANT CHANGE UP (ref 70–99)
MAGNESIUM SERPL-MCNC: 1.8 MG/DL — SIGNIFICANT CHANGE UP (ref 1.6–2.6)
PHOSPHATE SERPL-MCNC: 2.4 MG/DL — LOW (ref 2.5–4.5)
POTASSIUM SERPL-MCNC: 3.9 MMOL/L — SIGNIFICANT CHANGE UP (ref 3.5–5.3)
POTASSIUM SERPL-SCNC: 3.9 MMOL/L — SIGNIFICANT CHANGE UP (ref 3.5–5.3)
SODIUM SERPL-SCNC: 136 MMOL/L — SIGNIFICANT CHANGE UP (ref 135–145)

## 2020-04-13 RX ORDER — SODIUM,POTASSIUM PHOSPHATES 278-250MG
1 POWDER IN PACKET (EA) ORAL
Refills: 0 | Status: COMPLETED | OUTPATIENT
Start: 2020-04-13 | End: 2020-04-14

## 2020-04-13 RX ORDER — SODIUM CHLORIDE 9 MG/ML
250 INJECTION, SOLUTION INTRAVENOUS
Refills: 0 | Status: DISCONTINUED | OUTPATIENT
Start: 2020-04-13 | End: 2020-04-14

## 2020-04-13 RX ADMIN — ENOXAPARIN SODIUM 40 MILLIGRAM(S): 100 INJECTION SUBCUTANEOUS at 14:40

## 2020-04-13 RX ADMIN — AMLODIPINE BESYLATE 5 MILLIGRAM(S): 2.5 TABLET ORAL at 06:14

## 2020-04-13 RX ADMIN — Medication 1 TABLET(S): at 14:41

## 2020-04-13 RX ADMIN — Medication 1 PACKET(S): at 16:59

## 2020-04-13 RX ADMIN — Medication 81 MILLIGRAM(S): at 14:40

## 2020-04-13 RX ADMIN — SERTRALINE 50 MILLIGRAM(S): 25 TABLET, FILM COATED ORAL at 14:41

## 2020-04-13 RX ADMIN — SODIUM CHLORIDE 125 MILLILITER(S): 9 INJECTION, SOLUTION INTRAVENOUS at 16:59

## 2020-04-13 NOTE — DIETITIAN INITIAL EVALUATION ADULT. - OTHER INFO
80 y/o female PMHx of dementia, DM, HTN presents to ER for multiple complaints. Pt. is relatively poor historian, slow to answer questions, admitted for failure to thrive, poor PO intake, multiple episodes of syncope, followed by "body twitching", with bowel and bladder incontinence, followed by episode of lethargy, blank stare. Unable to conduct a face to face interview or nutrition-focused physical exam due to limited contact restrictions related to Pt's medical condition and isolation precautions. Collateral obtained from chart review. Per MD note PO intake remains suboptimal, this has been going on prior to admission as per daughter over phone. No GI distress(nausea/vomiting/diarrhea/constipation) per chart. No chewing or swallowing difficulties at this time per chart review. Past weight history obtained via HIE (4/9/13) 160 lbs. Admit weight (4/2/20) 159.2 lbs.No weight loss per chart review. Patient height noted to be 5'4. BMI 27.3 (Overweight) MD added Glucerna Therapeutic Nutrition 240mls 3x daily (660kcals, 30g protein) and Multivitamin to diet order.

## 2020-04-13 NOTE — PHYSICAL THERAPY INITIAL EVALUATION ADULT - ADDITIONAL COMMENTS
Progress Summary: Patient received supine in bed in NAD on room air 95%. Patient performed bed mobility independent, sit to stand with rolling walker supervision. Patient complains of dizziness instanding. BP supine 145/67 SpO2 95%, sitting 121/64 95%, unable to attain in standing due to dizziness. Patient returned to supine in bed in NAD, lines intact, and call button within reach, MADAI Serna made aware

## 2020-04-13 NOTE — DIETITIAN INITIAL EVALUATION ADULT. - PERTINENT MEDS FT
MEDICATIONS  (STANDING):  amLODIPine   Tablet 5 milliGRAM(s) Oral daily  aspirin enteric coated 81 milliGRAM(s) Oral daily  dextrose 5%. 1000 milliLiter(s) (50 mL/Hr) IV Continuous <Continuous>  dextrose 50% Injectable 12.5 Gram(s) IV Push once  dextrose 50% Injectable 25 Gram(s) IV Push once  dextrose 50% Injectable 25 Gram(s) IV Push once  enoxaparin Injectable 40 milliGRAM(s) SubCutaneous daily  insulin lispro (HumaLOG) corrective regimen sliding scale   SubCutaneous three times a day before meals  multivitamin 1 Tablet(s) Oral daily  potassium phosphate / sodium phosphate powder 1 Packet(s) Oral two times a day with meals  sertraline 50 milliGRAM(s) Oral daily    MEDICATIONS  (PRN):  dextrose 40% Gel 15 Gram(s) Oral once PRN Blood Glucose LESS THAN 70 milliGRAM(s)/deciliter  glucagon  Injectable 1 milliGRAM(s) IntraMuscular once PRN Glucose LESS THAN 70 milligrams/deciliter  LORazepam   Injectable 2 milliGRAM(s) IV Push every 6 hours PRN Agitation

## 2020-04-13 NOTE — PROGRESS NOTE ADULT - ASSESSMENT
78 y/o female PMHx of dementia, DM, HTN presents to ER for multiple complaints. Pt. is relatively poor historian, slow to answer questions, admitted for failure to thrive, poor PO intake, multiple episodes of syncope, followed by "body twitching", with bowel and bladder incontinence, followed by episode of lethargy, blank stare. 	    # Syncope, r/o Seizure vs. infectious etiology  # Ground glass opacities on CT chest  # Dementia  # DM/HTN    Plan:  Syncope >> seizure >> cardiac event  Per the daughters, syncopal episodes with body twitching, becoming more frequent this month. 4 episodes total.  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 EEG were okay at that time.   Pt requires help with ADLs including medication administration. AAO x 3 baseline but forgetful.  Now pt with very poor PO intake, sleeping most of the day. no fever no cough.  In ED, pt had multiple CTs, no fractures. Noted ground glass opacities on CT chest, COVID19 ended up (+)  The daughter Aneta was tested positive but she was self isolating at home. no significant interaction per the daughter.   tele monitor, check TTE, trop neg x 2, EKG unimpressive.  appreciate neuro input -> so far EEG not revealing of any seizure-like waveforms -> stopped EEG 4/11/20  no further neuro inpt workup  No longer orthostatic, stopped IVF 4/11/20    COVID (+): so far 100% on RA, but GCO on CT chest  Holding off plaquenil for now  Will hold off IV antibiotics for now  Daily CRP/ferritin    DM: will hold PO home meds (Janumet 50/100 mg BID)  insulin sliding scale    Nutrition: nutrition consult; added MVI and glucerna 4/13/20    HTN: resumed Norvasc 5 mg 4/11/20,  cont to hold Lisinopril 20 mg    Depression/anxiety: c/w Sertraline 50 mg qdaily    Conditioning: PT consult pending    DVT prophylaxis: SQ heparin

## 2020-04-13 NOTE — PHYSICAL THERAPY INITIAL EVALUATION ADULT - CRITERIA FOR SKILLED THERAPEUTIC INTERVENTIONS
functional limitations in following categories/risk reduction/prevention/rehab potential/anticipated discharge recommendation/predicted duration of therapy intervention/impairments found/therapy frequency

## 2020-04-13 NOTE — DIETITIAN INITIAL EVALUATION ADULT. - ADD RECOMMEND
1. Rec obtain current weight. 2. Monitor weights, labs, BM's, skin integrity, p.o. intake. 3. Please Encourage po intake, assist with meals and menu selections, provide alternatives PRN. 4. RD remains available, re-consult as needed.

## 2020-04-13 NOTE — PHYSICAL THERAPY INITIAL EVALUATION ADULT - GENERAL OBSERVATIONS, REHAB EVAL
Patient found supine in bed head of bed elevated in NAD, agreeable to evaluation on room air 95% spo2

## 2020-04-13 NOTE — DIETITIAN INITIAL EVALUATION ADULT. - PERTINENT LABORATORY DATA
04-13 Na136 mmol/L Glu 80 mg/dL K+ 3.9 mmol/L Cr  0.74 mg/dL BUN 14 mg/dL 04-13 Phos 2.4 mg/dL<L> 04-12 Alb 3.4 g/dL 04-08 [id number] 7.1 %<H>

## 2020-04-14 LAB
ANION GAP SERPL CALC-SCNC: 11 MMO/L — SIGNIFICANT CHANGE UP (ref 7–14)
BUN SERPL-MCNC: 13 MG/DL — SIGNIFICANT CHANGE UP (ref 7–23)
CALCIUM SERPL-MCNC: 10.3 MG/DL — SIGNIFICANT CHANGE UP (ref 8.4–10.5)
CHLORIDE SERPL-SCNC: 98 MMOL/L — SIGNIFICANT CHANGE UP (ref 98–107)
CO2 SERPL-SCNC: 27 MMOL/L — SIGNIFICANT CHANGE UP (ref 22–31)
CREAT SERPL-MCNC: 0.74 MG/DL — SIGNIFICANT CHANGE UP (ref 0.5–1.3)
CRP SERPL-MCNC: 19.3 MG/L — HIGH
FERRITIN SERPL-MCNC: 610.3 NG/ML — HIGH (ref 15–150)
GLUCOSE SERPL-MCNC: 75 MG/DL — SIGNIFICANT CHANGE UP (ref 70–99)
MAGNESIUM SERPL-MCNC: 1.7 MG/DL — SIGNIFICANT CHANGE UP (ref 1.6–2.6)
PHOSPHATE SERPL-MCNC: 2.5 MG/DL — SIGNIFICANT CHANGE UP (ref 2.5–4.5)
POTASSIUM SERPL-MCNC: 3.9 MMOL/L — SIGNIFICANT CHANGE UP (ref 3.5–5.3)
POTASSIUM SERPL-SCNC: 3.9 MMOL/L — SIGNIFICANT CHANGE UP (ref 3.5–5.3)
SODIUM SERPL-SCNC: 136 MMOL/L — SIGNIFICANT CHANGE UP (ref 135–145)

## 2020-04-14 RX ORDER — SODIUM CHLORIDE 9 MG/ML
1000 INJECTION, SOLUTION INTRAVENOUS
Refills: 0 | Status: DISCONTINUED | OUTPATIENT
Start: 2020-04-14 | End: 2020-04-15

## 2020-04-14 RX ADMIN — Medication 1 TABLET(S): at 12:18

## 2020-04-14 RX ADMIN — Medication 1: at 12:18

## 2020-04-14 RX ADMIN — SODIUM CHLORIDE 100 MILLILITER(S): 9 INJECTION, SOLUTION INTRAVENOUS at 08:51

## 2020-04-14 RX ADMIN — ENOXAPARIN SODIUM 40 MILLIGRAM(S): 100 INJECTION SUBCUTANEOUS at 12:18

## 2020-04-14 RX ADMIN — Medication 81 MILLIGRAM(S): at 12:19

## 2020-04-14 RX ADMIN — AMLODIPINE BESYLATE 5 MILLIGRAM(S): 2.5 TABLET ORAL at 04:52

## 2020-04-14 RX ADMIN — Medication 1 PACKET(S): at 08:51

## 2020-04-14 RX ADMIN — SERTRALINE 50 MILLIGRAM(S): 25 TABLET, FILM COATED ORAL at 12:18

## 2020-04-14 NOTE — PROGRESS NOTE ADULT - ASSESSMENT
78 y/o female PMHx of dementia, DM, HTN presents to ER for multiple complaints. Pt. is relatively poor historian, slow to answer questions, admitted for failure to thrive, poor PO intake, multiple episodes of syncope, followed by "body twitching", with bowel and bladder incontinence, followed by episode of lethargy, blank stare. 	    # Syncope, r/o Seizure vs. infectious etiology  # Ground glass opacities on CT chest  # Dementia  # DM/HTN    Plan:  Syncope >> seizure >> cardiac event  Per the daughters, syncopal episodes with body twitching, becoming more frequent this month. 4 episodes total.  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 EEG were okay at that time.   Pt requires help with ADLs including medication administration. AAO x 3 baseline but forgetful.  Now pt with very poor PO intake, sleeping most of the day. no fever no cough.  In ED, pt had multiple CTs, no fractures. Noted ground glass opacities on CT chest, COVID19 ended up (+)  The daughter Aneta was tested positive but she was self isolating at home. no significant interaction per the daughter.   tele monitor only revealing occasional sinus katy without any significant pauses, no need for TTE as per house cardiology, trop neg x 2, EKG unimpressive.  appreciate neuro input -> so far EEG not revealing of any seizure-like waveforms -> stopped EEG 4/11/20  no further neuro/cardiac inpt workup  Orthostatic hypotension -> IVF prn    COVID (+): so far 100% on RA, but GCO on CT chest  Holding off plaquenil for now  Will hold off IV antibiotics for now  CRP/ferritin q3days    DM: will hold PO home meds (Janumet 50/100 mg BID)  insulin sliding scale    Nutrition: nutrition consult; added MVI and glucerna 4/13/20    HTN: resumed Norvasc 5 mg 4/11/20,  cont to hold Lisinopril 20 mg unless further BP control needed    Depression/anxiety: c/w Sertraline 50 mg qdaily    Conditioning: PT consult underway    DVT prophylaxis: SQ heparin

## 2020-04-15 LAB — CORTIS SERPL-MCNC: 20.2 UG/DL — HIGH (ref 2.7–18.4)

## 2020-04-15 RX ORDER — SODIUM CHLORIDE 9 MG/ML
1000 INJECTION INTRAMUSCULAR; INTRAVENOUS; SUBCUTANEOUS
Refills: 0 | Status: DISCONTINUED | OUTPATIENT
Start: 2020-04-15 | End: 2020-04-16

## 2020-04-15 RX ADMIN — AMLODIPINE BESYLATE 5 MILLIGRAM(S): 2.5 TABLET ORAL at 05:30

## 2020-04-15 RX ADMIN — SERTRALINE 50 MILLIGRAM(S): 25 TABLET, FILM COATED ORAL at 12:28

## 2020-04-15 RX ADMIN — ENOXAPARIN SODIUM 40 MILLIGRAM(S): 100 INJECTION SUBCUTANEOUS at 12:29

## 2020-04-15 RX ADMIN — Medication 1 TABLET(S): at 12:28

## 2020-04-15 RX ADMIN — SODIUM CHLORIDE 100 MILLILITER(S): 9 INJECTION INTRAMUSCULAR; INTRAVENOUS; SUBCUTANEOUS at 08:50

## 2020-04-15 RX ADMIN — Medication 81 MILLIGRAM(S): at 12:28

## 2020-04-16 LAB
ANION GAP SERPL CALC-SCNC: 10 MMO/L — SIGNIFICANT CHANGE UP (ref 7–14)
BUN SERPL-MCNC: 7 MG/DL — SIGNIFICANT CHANGE UP (ref 7–23)
CALCIUM SERPL-MCNC: 10 MG/DL — SIGNIFICANT CHANGE UP (ref 8.4–10.5)
CHLORIDE SERPL-SCNC: 105 MMOL/L — SIGNIFICANT CHANGE UP (ref 98–107)
CO2 SERPL-SCNC: 26 MMOL/L — SIGNIFICANT CHANGE UP (ref 22–31)
CREAT SERPL-MCNC: 0.67 MG/DL — SIGNIFICANT CHANGE UP (ref 0.5–1.3)
GLUCOSE SERPL-MCNC: 114 MG/DL — HIGH (ref 70–99)
HCT VFR BLD CALC: 35.2 % — SIGNIFICANT CHANGE UP (ref 34.5–45)
HGB BLD-MCNC: 11.3 G/DL — LOW (ref 11.5–15.5)
MAGNESIUM SERPL-MCNC: 1.5 MG/DL — LOW (ref 1.6–2.6)
MCHC RBC-ENTMCNC: 25.5 PG — LOW (ref 27–34)
MCHC RBC-ENTMCNC: 32.1 % — SIGNIFICANT CHANGE UP (ref 32–36)
MCV RBC AUTO: 79.3 FL — LOW (ref 80–100)
NRBC # FLD: 0 K/UL — SIGNIFICANT CHANGE UP (ref 0–0)
PHOSPHATE SERPL-MCNC: 2.4 MG/DL — LOW (ref 2.5–4.5)
PLATELET # BLD AUTO: 465 K/UL — HIGH (ref 150–400)
PMV BLD: 10.3 FL — SIGNIFICANT CHANGE UP (ref 7–13)
POTASSIUM SERPL-MCNC: 4 MMOL/L — SIGNIFICANT CHANGE UP (ref 3.5–5.3)
POTASSIUM SERPL-SCNC: 4 MMOL/L — SIGNIFICANT CHANGE UP (ref 3.5–5.3)
RBC # BLD: 4.44 M/UL — SIGNIFICANT CHANGE UP (ref 3.8–5.2)
RBC # FLD: 13.4 % — SIGNIFICANT CHANGE UP (ref 10.3–14.5)
SODIUM SERPL-SCNC: 141 MMOL/L — SIGNIFICANT CHANGE UP (ref 135–145)
WBC # BLD: 6.5 K/UL — SIGNIFICANT CHANGE UP (ref 3.8–10.5)
WBC # FLD AUTO: 6.5 K/UL — SIGNIFICANT CHANGE UP (ref 3.8–10.5)

## 2020-04-16 RX ORDER — MIDODRINE HYDROCHLORIDE 2.5 MG/1
2.5 TABLET ORAL THREE TIMES A DAY
Refills: 0 | Status: DISCONTINUED | OUTPATIENT
Start: 2020-04-16 | End: 2020-04-17

## 2020-04-16 RX ORDER — MIDODRINE HYDROCHLORIDE 2.5 MG/1
5 TABLET ORAL THREE TIMES A DAY
Refills: 0 | Status: DISCONTINUED | OUTPATIENT
Start: 2020-04-16 | End: 2020-04-16

## 2020-04-16 RX ADMIN — ENOXAPARIN SODIUM 40 MILLIGRAM(S): 100 INJECTION SUBCUTANEOUS at 11:58

## 2020-04-16 RX ADMIN — MIDODRINE HYDROCHLORIDE 2.5 MILLIGRAM(S): 2.5 TABLET ORAL at 22:02

## 2020-04-16 RX ADMIN — Medication 81 MILLIGRAM(S): at 11:58

## 2020-04-16 RX ADMIN — Medication 1 TABLET(S): at 11:58

## 2020-04-16 RX ADMIN — SERTRALINE 50 MILLIGRAM(S): 25 TABLET, FILM COATED ORAL at 11:58

## 2020-04-16 NOTE — PROGRESS NOTE ADULT - ASSESSMENT
78 y/o female PMHx of dementia, DM, HTN presents to ER for multiple complaints. Pt. is relatively poor historian, slow to answer questions, admitted for failure to thrive, poor PO intake, multiple episodes of syncope, followed by "body twitching", with bowel and bladder incontinence, followed by episode of lethargy, blank stare. 	    # Syncope, r/o Seizure vs. infectious etiology vs. orthostatic hypotension  # Ground glass opacities on CT chest  # Dementia  # DM/HTN    Plan:  Syncope >> seizure >> cardiac event (less likely) vs. orthostatic hypotension   Per the daughters, syncopal episodes with body twitching, becoming more frequent this month. 4 episodes total.  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 EEG were okay at that time.   Pt requires help with ADLs including medication administration. AAO x 3 baseline but forgetful.  Now pt with very poor PO intake, sleeping most of the day. no fever no cough.  In ED, pt had multiple CTs, no fractures. Noted ground glass opacities on CT chest, COVID19 ended up (+)  The daughter Aneta was tested positive but she was self isolating at home. no significant interaction per the daughter.   tele monitor only revealing occasional sinus katy without any significant pauses, no need for TTE as per house cardiology, trop neg x 2, EKG unimpressive.  appreciate neuro input -> so far EEG not revealing of any seizure-like waveforms -> stopped EEG 4/11/20  no further neuro/cardiac inpt workup  Orthostatic hypotension -> IVF prn    Orthostatic hypotension  (no HA/dizziness reported per pt. however she remains orthostatic despite IVF x 2-3 days)  per RN, pt was dizzy when sitting up. midodrine 2.5 mg TID started for symptomatic orthostatic hypotension)  repeat orthostatic in am    COVID (+): so far 100% on RA, but GCO on CT chest  Holding off plaquenil for now  Will hold off IV antibiotics for now  CRP/ferritin q3days    DM: will hold PO home meds (Janumet 50/100 mg BID)  insulin sliding scale    Nutrition: nutrition consult; added MVI and glucerna 4/13/20    HTN: resumed Norvasc 5 mg 4/11/20,  cont to hold Lisinopril 20 mg unless further BP control needed    Depression/anxiety: c/w Sertraline 50 mg qdaily    Conditioning: PT consult underway    DVT prophylaxis: SQ heparin

## 2020-04-17 LAB
ANION GAP SERPL CALC-SCNC: 13 MMO/L — SIGNIFICANT CHANGE UP (ref 7–14)
BUN SERPL-MCNC: 7 MG/DL — SIGNIFICANT CHANGE UP (ref 7–23)
CALCIUM SERPL-MCNC: 9.9 MG/DL — SIGNIFICANT CHANGE UP (ref 8.4–10.5)
CHLORIDE SERPL-SCNC: 103 MMOL/L — SIGNIFICANT CHANGE UP (ref 98–107)
CO2 SERPL-SCNC: 24 MMOL/L — SIGNIFICANT CHANGE UP (ref 22–31)
CREAT SERPL-MCNC: 0.65 MG/DL — SIGNIFICANT CHANGE UP (ref 0.5–1.3)
CRP SERPL-MCNC: 5.2 MG/L — HIGH
FERRITIN SERPL-MCNC: 421 NG/ML — HIGH (ref 15–150)
GLUCOSE SERPL-MCNC: 107 MG/DL — HIGH (ref 70–99)
HCT VFR BLD CALC: 34.6 % — SIGNIFICANT CHANGE UP (ref 34.5–45)
HGB BLD-MCNC: 11.2 G/DL — LOW (ref 11.5–15.5)
MAGNESIUM SERPL-MCNC: 1.5 MG/DL — LOW (ref 1.6–2.6)
MCHC RBC-ENTMCNC: 25.5 PG — LOW (ref 27–34)
MCHC RBC-ENTMCNC: 32.4 % — SIGNIFICANT CHANGE UP (ref 32–36)
MCV RBC AUTO: 78.6 FL — LOW (ref 80–100)
NRBC # FLD: 0 K/UL — SIGNIFICANT CHANGE UP (ref 0–0)
PHOSPHATE SERPL-MCNC: 2.4 MG/DL — LOW (ref 2.5–4.5)
PLATELET # BLD AUTO: 438 K/UL — HIGH (ref 150–400)
PMV BLD: 10.6 FL — SIGNIFICANT CHANGE UP (ref 7–13)
POTASSIUM SERPL-MCNC: 3.4 MMOL/L — LOW (ref 3.5–5.3)
POTASSIUM SERPL-SCNC: 3.4 MMOL/L — LOW (ref 3.5–5.3)
RBC # BLD: 4.4 M/UL — SIGNIFICANT CHANGE UP (ref 3.8–5.2)
RBC # FLD: 13.4 % — SIGNIFICANT CHANGE UP (ref 10.3–14.5)
SODIUM SERPL-SCNC: 140 MMOL/L — SIGNIFICANT CHANGE UP (ref 135–145)
WBC # BLD: 7.5 K/UL — SIGNIFICANT CHANGE UP (ref 3.8–10.5)
WBC # FLD AUTO: 7.5 K/UL — SIGNIFICANT CHANGE UP (ref 3.8–10.5)

## 2020-04-17 RX ORDER — POTASSIUM CHLORIDE 20 MEQ
40 PACKET (EA) ORAL ONCE
Refills: 0 | Status: COMPLETED | OUTPATIENT
Start: 2020-04-17 | End: 2020-04-17

## 2020-04-17 RX ORDER — MIDODRINE HYDROCHLORIDE 2.5 MG/1
5 TABLET ORAL THREE TIMES A DAY
Refills: 0 | Status: DISCONTINUED | OUTPATIENT
Start: 2020-04-17 | End: 2020-04-22

## 2020-04-17 RX ORDER — SODIUM CHLORIDE 9 MG/ML
1000 INJECTION INTRAMUSCULAR; INTRAVENOUS; SUBCUTANEOUS
Refills: 0 | Status: DISCONTINUED | OUTPATIENT
Start: 2020-04-17 | End: 2020-04-20

## 2020-04-17 RX ADMIN — SODIUM CHLORIDE 100 MILLILITER(S): 9 INJECTION INTRAMUSCULAR; INTRAVENOUS; SUBCUTANEOUS at 06:41

## 2020-04-17 RX ADMIN — SODIUM CHLORIDE 100 MILLILITER(S): 9 INJECTION INTRAMUSCULAR; INTRAVENOUS; SUBCUTANEOUS at 12:51

## 2020-04-17 RX ADMIN — MIDODRINE HYDROCHLORIDE 5 MILLIGRAM(S): 2.5 TABLET ORAL at 17:21

## 2020-04-17 RX ADMIN — ENOXAPARIN SODIUM 40 MILLIGRAM(S): 100 INJECTION SUBCUTANEOUS at 12:31

## 2020-04-17 RX ADMIN — MIDODRINE HYDROCHLORIDE 5 MILLIGRAM(S): 2.5 TABLET ORAL at 12:51

## 2020-04-17 RX ADMIN — Medication 40 MILLIEQUIVALENT(S): at 12:30

## 2020-04-17 RX ADMIN — SERTRALINE 50 MILLIGRAM(S): 25 TABLET, FILM COATED ORAL at 12:30

## 2020-04-17 RX ADMIN — MIDODRINE HYDROCHLORIDE 2.5 MILLIGRAM(S): 2.5 TABLET ORAL at 05:22

## 2020-04-17 RX ADMIN — Medication 81 MILLIGRAM(S): at 12:31

## 2020-04-17 RX ADMIN — Medication 1 TABLET(S): at 12:31

## 2020-04-17 NOTE — CHART NOTE - NSCHARTNOTEFT_GEN_A_CORE
Notified by RN that this AM she was performing orthostatics blood pressure monitoring on patient, when patient went to stand up her BP dropped to 69/36, and the patient became dizzy, sleepy her eyes shut and she synopsized. The RN was able to guide patient down to the floor. The syncopal episode was witnessed and it was provoked by large drop in blood pressure.     Patient seen and examined at bedside. She is A&Ox3, remembers standing up and feeling dizzy and sleepy, says she felt her eyes close and in seconds she woke up was on the floor. She denies hitting her head, back, extremities. Denies having any pain. Denies chest pain, SOB, palpitations. Denies feeling dizzy while lying in bed.     Vital Signs Last 24 Hrs  T(C): 36.8 (17 Apr 2020 05:10), Max: 36.8 (17 Apr 2020 05:10)  T(F): 98.3 (17 Apr 2020 05:10), Max: 98.3 (17 Apr 2020 05:10)  HR: 63 (16 Apr 2020 22:00) (63 - 95)  BP: 149/54 (16 Apr 2020 22:00) (141/71 - 149/54)  RR: 16 (17 Apr 2020 05:10) (16 - 17)  SpO2: 99% (17 Apr 2020 05:10) (98% - 100%)    PHYSICAL EXAM:  GENERAL: No acute distress, well-developed  NEUROLOGY: A&O x 3, no focal deficits  HEAD:  Atraumatic, Normocephalic, no tenderness, step-offs or deformities   EYES: EOMI, PERRLA, conjunctiva and sclera clear  NECK: Supple, no lymphadenopathy, no JVD  CHEST/LUNG: CTAB; No wheezes, rales, or rhonchi  HEART: Regular rate and rhythm; No murmurs, rubs, or gallops  ABDOMEN: Soft, non-tender, non-distended; normal bowel sounds  EXTREMITIES:  2+ peripheral pulses b/l, No clubbing, cyanosis, or edema, no tenderness, step offs or deformities.   SKIN: No rashes or lesions or abrasions     A/P: 78 y/o female PMHx of dementia, DM, HTN presented to ED w/ episodes of syncope, followed by "body twitching", with bowel and bladder incontinence, followed by episode of lethargy. Found to be COVID-19 positive. Patient also found to be persistently orthostatic positive despite Midodrine and IVF. This AM during orthostatic BP measuring, the patient synopsized when she stood up and her SBP dropped from 112-->69.     - Syncope more than likely provoked by substantial drop in blood pressure   - CT Head from 4/7 unremarkable, since fall was witnessed and there was no head trauma no need ot repeat at this time.   - No events noted on telemetry during event   - Midodrine 2.5mg given  - IVF 100cc/her x 10hrs  - Dr. Leach called, awaiting call-back.

## 2020-04-17 NOTE — PROGRESS NOTE ADULT - ASSESSMENT
78 y/o female PMHx of advanced dementia, DM, HTN presents to ER for multiple complaints. Pt is relatively poor historian, slow to answer questions, admitted for failure to thrive, poor PO intake, multiple episodes of syncope, followed by "body twitching", with bowel and bladder incontinence, followed by episode of lethargy, blank stare. 	    # Syncope, r/o Seizure vs. infectious etiology vs. orthostatic hypotension  # Ground glass opacities on CT chest  # Dementia  # DM/HTN    Plan:  Syncope >> seizure >> cardiac event (less likely) vs. orthostatic hypotension   Per the daughters, syncopal episodes with body twitching, becoming more frequent this month. 4 episodes total.  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 EEG were okay at that time.   Pt requires help with ADLs including medication administration. AAO x 3 baseline but forgetful.  Now pt with very poor PO intake, sleeping most of the day. no fever no cough.  In ED, pt had multiple CTs, no fractures. Noted ground glass opacities on CT chest, COVID19 ended up (+)  The daughter Aneta was tested positive but she was self isolating at home. no significant interaction per the daughter.   tele monitor only revealing occasional sinus katy without any significant pauses, no need for TTE as per house cardiology, trop neg x 2, EKG unimpressive.  appreciate neuro input -> so far EEG not revealing of any seizure-like waveforms -> stopped EEG 4/11/20  no further neuro/cardiac inpt workup  Orthostatic hypotension -> IVF prn    Orthostatic hypotension  (no HA/dizziness reported per pt. however she remains orthostatic despite IVF x 2-3 days)  per RN, pt was dizzy when sitting up. midodrine 2.5 mg TID started for symptomatic orthostatic hypotension)  repeat orthostatic still positive with pre-syncopal event  midodrine increased to 5 mg TID.     COVID (+): so far 100% on RA, but GCO on CT chest  Holding off plaquenil for now  Will hold off IV antibiotics for now  CRP/ferritin q3days    DM: will hold PO home meds (Janumet 50/100 mg BID)  insulin sliding scale    Nutrition: nutrition consult; added MVI and glucerna 4/13/20    HTN: resumed Norvasc 5 mg 4/11/20,  cont to hold Lisinopril 20 mg unless further BP control needed    Depression/anxiety: c/w Sertraline 50 mg qdaily    Conditioning: PT consult underway    DVT prophylaxis: SQ heparin

## 2020-04-17 NOTE — PROVIDER CONTACT NOTE (FALL NOTIFICATION) - SITUATION
patient synopsized when talking standing orthostatic blood pressure patient assisted to the floor by RN

## 2020-04-18 LAB
ALBUMIN SERPL ELPH-MCNC: 3.4 G/DL — SIGNIFICANT CHANGE UP (ref 3.3–5)
ALP SERPL-CCNC: 94 U/L — SIGNIFICANT CHANGE UP (ref 40–120)
ALT FLD-CCNC: 12 U/L — SIGNIFICANT CHANGE UP (ref 4–33)
ANION GAP SERPL CALC-SCNC: 11 MMO/L — SIGNIFICANT CHANGE UP (ref 7–14)
AST SERPL-CCNC: 20 U/L — SIGNIFICANT CHANGE UP (ref 4–32)
BASOPHILS # BLD AUTO: 0.05 K/UL — SIGNIFICANT CHANGE UP (ref 0–0.2)
BASOPHILS NFR BLD AUTO: 0.9 % — SIGNIFICANT CHANGE UP (ref 0–2)
BILIRUB SERPL-MCNC: 0.4 MG/DL — SIGNIFICANT CHANGE UP (ref 0.2–1.2)
BUN SERPL-MCNC: 7 MG/DL — SIGNIFICANT CHANGE UP (ref 7–23)
CALCIUM SERPL-MCNC: 10 MG/DL — SIGNIFICANT CHANGE UP (ref 8.4–10.5)
CHLORIDE SERPL-SCNC: 105 MMOL/L — SIGNIFICANT CHANGE UP (ref 98–107)
CO2 SERPL-SCNC: 25 MMOL/L — SIGNIFICANT CHANGE UP (ref 22–31)
CREAT SERPL-MCNC: 0.71 MG/DL — SIGNIFICANT CHANGE UP (ref 0.5–1.3)
CRP SERPL-MCNC: < 4 MG/L — SIGNIFICANT CHANGE UP
EOSINOPHIL # BLD AUTO: 0.1 K/UL — SIGNIFICANT CHANGE UP (ref 0–0.5)
EOSINOPHIL NFR BLD AUTO: 1.7 % — SIGNIFICANT CHANGE UP (ref 0–6)
GLUCOSE SERPL-MCNC: 92 MG/DL — SIGNIFICANT CHANGE UP (ref 70–99)
HCT VFR BLD CALC: 34 % — LOW (ref 34.5–45)
HGB BLD-MCNC: 10.8 G/DL — LOW (ref 11.5–15.5)
IMM GRANULOCYTES NFR BLD AUTO: 0.3 % — SIGNIFICANT CHANGE UP (ref 0–1.5)
LYMPHOCYTES # BLD AUTO: 2.15 K/UL — SIGNIFICANT CHANGE UP (ref 1–3.3)
LYMPHOCYTES # BLD AUTO: 37.5 % — SIGNIFICANT CHANGE UP (ref 13–44)
MAGNESIUM SERPL-MCNC: 1.6 MG/DL — SIGNIFICANT CHANGE UP (ref 1.6–2.6)
MCHC RBC-ENTMCNC: 25.5 PG — LOW (ref 27–34)
MCHC RBC-ENTMCNC: 31.8 % — LOW (ref 32–36)
MCV RBC AUTO: 80.2 FL — SIGNIFICANT CHANGE UP (ref 80–100)
MONOCYTES # BLD AUTO: 0.85 K/UL — SIGNIFICANT CHANGE UP (ref 0–0.9)
MONOCYTES NFR BLD AUTO: 14.8 % — HIGH (ref 2–14)
NEUTROPHILS # BLD AUTO: 2.57 K/UL — SIGNIFICANT CHANGE UP (ref 1.8–7.4)
NEUTROPHILS NFR BLD AUTO: 44.8 % — SIGNIFICANT CHANGE UP (ref 43–77)
NRBC # FLD: 0 K/UL — SIGNIFICANT CHANGE UP (ref 0–0)
PHOSPHATE SERPL-MCNC: 2.2 MG/DL — LOW (ref 2.5–4.5)
PLATELET # BLD AUTO: 450 K/UL — HIGH (ref 150–400)
PMV BLD: 10.7 FL — SIGNIFICANT CHANGE UP (ref 7–13)
POTASSIUM SERPL-MCNC: 4 MMOL/L — SIGNIFICANT CHANGE UP (ref 3.5–5.3)
POTASSIUM SERPL-SCNC: 4 MMOL/L — SIGNIFICANT CHANGE UP (ref 3.5–5.3)
PROT SERPL-MCNC: 7.1 G/DL — SIGNIFICANT CHANGE UP (ref 6–8.3)
RBC # BLD: 4.24 M/UL — SIGNIFICANT CHANGE UP (ref 3.8–5.2)
RBC # FLD: 13.8 % — SIGNIFICANT CHANGE UP (ref 10.3–14.5)
SODIUM SERPL-SCNC: 141 MMOL/L — SIGNIFICANT CHANGE UP (ref 135–145)
WBC # BLD: 5.74 K/UL — SIGNIFICANT CHANGE UP (ref 3.8–10.5)
WBC # FLD AUTO: 5.74 K/UL — SIGNIFICANT CHANGE UP (ref 3.8–10.5)

## 2020-04-18 RX ORDER — FLUDROCORTISONE ACETATE 0.1 MG/1
0.2 TABLET ORAL DAILY
Refills: 0 | Status: DISCONTINUED | OUTPATIENT
Start: 2020-04-18 | End: 2020-04-22

## 2020-04-18 RX ADMIN — Medication 81 MILLIGRAM(S): at 12:02

## 2020-04-18 RX ADMIN — SERTRALINE 50 MILLIGRAM(S): 25 TABLET, FILM COATED ORAL at 12:02

## 2020-04-18 RX ADMIN — FLUDROCORTISONE ACETATE 0.2 MILLIGRAM(S): 0.1 TABLET ORAL at 18:24

## 2020-04-18 RX ADMIN — ENOXAPARIN SODIUM 40 MILLIGRAM(S): 100 INJECTION SUBCUTANEOUS at 12:02

## 2020-04-18 RX ADMIN — MIDODRINE HYDROCHLORIDE 5 MILLIGRAM(S): 2.5 TABLET ORAL at 05:22

## 2020-04-18 RX ADMIN — Medication 1 TABLET(S): at 12:02

## 2020-04-18 RX ADMIN — MIDODRINE HYDROCHLORIDE 5 MILLIGRAM(S): 2.5 TABLET ORAL at 12:02

## 2020-04-18 RX ADMIN — MIDODRINE HYDROCHLORIDE 5 MILLIGRAM(S): 2.5 TABLET ORAL at 16:33

## 2020-04-18 NOTE — PROGRESS NOTE ADULT - ASSESSMENT
80 y/o female PMHx of advanced dementia, DM, HTN presents to ER for multiple complaints. Pt is relatively poor historian, slow to answer questions, admitted for failure to thrive, poor PO intake, multiple episodes of syncope, followed by "body twitching", with bowel and bladder incontinence, followed by episode of lethargy, blank stare. 	    # Syncope, r/o Seizure vs. infectious etiology vs. orthostatic hypotension  # Ground glass opacities on CT chest  # Dementia  # DM/HTN    Plan:  Syncope >> seizure >> cardiac event (less likely) vs. orthostatic hypotension   Per the daughters, syncopal episodes with body twitching, becoming more frequent this month. 4 episodes total.  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 EEG were okay at that time.   Pt requires help with ADLs including medication administration. AAO x 3 baseline but forgetful.  Now pt with very poor PO intake, sleeping most of the day. no fever no cough.  In ED, pt had multiple CTs, no fractures. Noted ground glass opacities on CT chest, COVID19 ended up (+)  The daughter Aneta was tested positive but she was self isolating at home. no significant interaction per the daughter.   tele monitor only revealing occasional sinus katy without any significant pauses, no need for TTE as per house cardiology, trop neg x 2, EKG unimpressive.  appreciate neuro input -> so far EEG not revealing of any seizure-like waveforms -> stopped EEG 4/11/20  no further neuro/cardiac inpt workup  Orthostatic hypotension -> IVF prn    Orthostatic hypotension  (no HA/dizziness reported per pt. however she remains orthostatic despite IVF x 2-3 days)  per RN, pt was dizzy when sitting up. midodrine 2.5 mg TID started for symptomatic orthostatic hypotension)  repeat orthostatic still positive with pre-syncopal event  midodrine increased to 5 mg TID. started florinef 0.2 qdaily    COVID (+): so far 100% on RA, but GCO on CT chest  Holding off plaquenil for now  Will hold off IV antibiotics for now  CRP/ferritin q3days    DM: will hold PO home meds (Janumet 50/100 mg BID)  insulin sliding scale    Nutrition: nutrition consult; added MVI and glucerna 4/13/20    HTN: resumed Norvasc 5 mg 4/11/20,  cont to hold Lisinopril 20 mg unless further BP control needed    Depression/anxiety: c/w Sertraline 50 mg qdaily    Conditioning: PT consult underway    DVT prophylaxis: SQ heparin

## 2020-04-19 RX ADMIN — Medication 81 MILLIGRAM(S): at 11:59

## 2020-04-19 RX ADMIN — FLUDROCORTISONE ACETATE 0.2 MILLIGRAM(S): 0.1 TABLET ORAL at 05:49

## 2020-04-19 RX ADMIN — Medication 1 TABLET(S): at 11:59

## 2020-04-19 RX ADMIN — SERTRALINE 50 MILLIGRAM(S): 25 TABLET, FILM COATED ORAL at 11:59

## 2020-04-19 RX ADMIN — ENOXAPARIN SODIUM 40 MILLIGRAM(S): 100 INJECTION SUBCUTANEOUS at 12:01

## 2020-04-19 RX ADMIN — MIDODRINE HYDROCHLORIDE 5 MILLIGRAM(S): 2.5 TABLET ORAL at 11:59

## 2020-04-19 RX ADMIN — MIDODRINE HYDROCHLORIDE 5 MILLIGRAM(S): 2.5 TABLET ORAL at 05:49

## 2020-04-19 RX ADMIN — MIDODRINE HYDROCHLORIDE 5 MILLIGRAM(S): 2.5 TABLET ORAL at 16:16

## 2020-04-19 NOTE — PROGRESS NOTE ADULT - ASSESSMENT
78 y/o female PMHx of advanced dementia, DM, HTN presents to ER for multiple complaints. Pt is relatively poor historian, slow to answer questions, admitted for failure to thrive, poor PO intake, multiple episodes of syncope, followed by "body twitching", with bowel and bladder incontinence, followed by episode of lethargy, blank stare. 	    # Syncope, r/o Seizure vs. infectious etiology vs. orthostatic hypotension  # Ground glass opacities on CT chest  # Dementia  # DM/HTN    Plan:  Syncope >> seizure >> cardiac event (less likely) vs. orthostatic hypotension   Per the daughters, syncopal episodes with body twitching, becoming more frequent this month. 4 episodes total.  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 EEG were okay at that time.   Pt requires help with ADLs including medication administration. AAO x 3 baseline but forgetful due to advanced dementia  Now pt with very poor PO intake, sleeping most of the day. no fever no cough.  In ED, pt had multiple CTs, no fractures. Noted ground glass opacities on CT chest, COVID19 ended up (+)  The daughter Aneta was tested positive but she was self isolating at home. no significant interaction per the daughter.   tele monitor only revealing occasional sinus katy without any significant pauses, no need for TTE as per house cardiology, trop neg x 2, EKG unimpressive.  appreciate neuro input -> so far EEG not revealing of any seizure-like waveforms -> stopped EEG 4/11/20  no further neuro/cardiac inpt workup  Orthostatic hypotension -> see below    Orthostatic hypotension  (no HA/dizziness reported per pt. however she remains orthostatic despite IVF x 2-3 days)  per RN, pt was dizzy when sitting up. midodrine 2.5 mg TID started increased to 5 mg TID for symptomatic orthostatic hypotension)  repeat orthostatic still positive with pre-syncopal event  so started florinef 0.2 qdaily. symptoms improving overall  repeat orthostatic vitals this evening. will adjust accordingly. monitor for symptoms.     COVID (+): so far 100% on RA, but GCO on CT chest  Holding off Plaquenil for now  Will hold off IV antibiotics for now  CRP/ferritin q3days    DM: will hold PO home meds (Janumet 50/100 mg BID)  insulin sliding scale    Nutrition: nutrition consult; added MVI and glucerna 4/13/20    HTN: resumed Norvasc 5 mg 4/11/20,  cont to hold Lisinopril 20 mg unless further BP control needed    Depression/anxiety: c/w Sertraline 50 mg qdaily    Conditioning: PT consult underway    DVT prophylaxis: SQ heparin

## 2020-04-20 LAB
ALBUMIN SERPL ELPH-MCNC: 3.5 G/DL — SIGNIFICANT CHANGE UP (ref 3.3–5)
ALP SERPL-CCNC: 98 U/L — SIGNIFICANT CHANGE UP (ref 40–120)
ALT FLD-CCNC: 14 U/L — SIGNIFICANT CHANGE UP (ref 4–33)
ANION GAP SERPL CALC-SCNC: 15 MMO/L — HIGH (ref 7–14)
AST SERPL-CCNC: 17 U/L — SIGNIFICANT CHANGE UP (ref 4–32)
BILIRUB SERPL-MCNC: 0.4 MG/DL — SIGNIFICANT CHANGE UP (ref 0.2–1.2)
BUN SERPL-MCNC: 9 MG/DL — SIGNIFICANT CHANGE UP (ref 7–23)
CALCIUM SERPL-MCNC: 10.1 MG/DL — SIGNIFICANT CHANGE UP (ref 8.4–10.5)
CHLORIDE SERPL-SCNC: 103 MMOL/L — SIGNIFICANT CHANGE UP (ref 98–107)
CO2 SERPL-SCNC: 23 MMOL/L — SIGNIFICANT CHANGE UP (ref 22–31)
CREAT SERPL-MCNC: 0.76 MG/DL — SIGNIFICANT CHANGE UP (ref 0.5–1.3)
GLUCOSE SERPL-MCNC: 86 MG/DL — SIGNIFICANT CHANGE UP (ref 70–99)
MAGNESIUM SERPL-MCNC: 1.7 MG/DL — SIGNIFICANT CHANGE UP (ref 1.6–2.6)
PHOSPHATE SERPL-MCNC: 2.9 MG/DL — SIGNIFICANT CHANGE UP (ref 2.5–4.5)
POTASSIUM SERPL-MCNC: 3.5 MMOL/L — SIGNIFICANT CHANGE UP (ref 3.5–5.3)
POTASSIUM SERPL-SCNC: 3.5 MMOL/L — SIGNIFICANT CHANGE UP (ref 3.5–5.3)
PROT SERPL-MCNC: 7.4 G/DL — SIGNIFICANT CHANGE UP (ref 6–8.3)
SODIUM SERPL-SCNC: 141 MMOL/L — SIGNIFICANT CHANGE UP (ref 135–145)

## 2020-04-20 RX ORDER — SODIUM CHLORIDE 9 MG/ML
1000 INJECTION INTRAMUSCULAR; INTRAVENOUS; SUBCUTANEOUS
Refills: 0 | Status: DISCONTINUED | OUTPATIENT
Start: 2020-04-20 | End: 2020-04-21

## 2020-04-20 RX ADMIN — MIDODRINE HYDROCHLORIDE 5 MILLIGRAM(S): 2.5 TABLET ORAL at 16:40

## 2020-04-20 RX ADMIN — SODIUM CHLORIDE 75 MILLILITER(S): 9 INJECTION INTRAMUSCULAR; INTRAVENOUS; SUBCUTANEOUS at 17:09

## 2020-04-20 RX ADMIN — SERTRALINE 50 MILLIGRAM(S): 25 TABLET, FILM COATED ORAL at 12:08

## 2020-04-20 RX ADMIN — FLUDROCORTISONE ACETATE 0.2 MILLIGRAM(S): 0.1 TABLET ORAL at 05:08

## 2020-04-20 RX ADMIN — MIDODRINE HYDROCHLORIDE 5 MILLIGRAM(S): 2.5 TABLET ORAL at 05:08

## 2020-04-20 RX ADMIN — Medication 1 TABLET(S): at 12:08

## 2020-04-20 RX ADMIN — ENOXAPARIN SODIUM 40 MILLIGRAM(S): 100 INJECTION SUBCUTANEOUS at 12:08

## 2020-04-20 RX ADMIN — Medication 81 MILLIGRAM(S): at 12:08

## 2020-04-20 NOTE — PROGRESS NOTE ADULT - ASSESSMENT
78 y/o female PMHx of advanced dementia, DM, HTN presents to ER for multiple complaints. Pt is relatively poor historian, slow to answer questions, admitted for failure to thrive, poor PO intake, multiple episodes of syncope, followed by "body twitching", with bowel and bladder incontinence, followed by episode of lethargy, blank stare. 	    # Syncope, r/o Seizure vs. infectious etiology vs. orthostatic hypotension  # Ground glass opacities on CT chest  # Dementia  # DM/HTN    Plan:  Syncope >> seizure >> cardiac event (less likely) vs. orthostatic hypotension   Per the daughters, syncopal episodes with body twitching, becoming more frequent this month. 4 episodes total.  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 EEG were okay at that time.   Pt requires help with ADLs including medication administration. AAO x 3 baseline but forgetful due to advanced dementia  Now pt with very poor PO intake, sleeping most of the day. no fever no cough.  In ED, pt had multiple CTs, no fractures. Noted ground glass opacities on CT chest, COVID19 ended up (+)  The daughter Aneta was tested positive but she was self isolating at home. no significant interaction per the daughter.   tele monitor only revealing occasional sinus katy without any significant pauses, no need for TTE as per house cardiology, trop neg x 2, EKG unimpressive.  appreciate neuro input -> so far EEG not revealing of any seizure-like waveforms -> stopped EEG 4/11/20  no further neuro/cardiac inpt workup  Orthostatic hypotension -> see below    Orthostatic hypotension  (no HA/dizziness reported per pt. however she remains orthostatic despite IVF x 2-3 days)  per RN, pt was dizzy when sitting up. midodrine 2.5 mg TID started increased to 5 mg TID for symptomatic orthostatic hypotension)  repeat orthostatic still positive with pre-syncopal event  so started florinef 0.2 qdaily. symptoms improving overall  orthostatic vitals are improving though still positive.   If she is symptomatic, will titrate up Florinef, but otherwise dc planning home.     COVID (+): so far 100% on RA, but GCO on CT chest  Holding off Plaquenil for now  Will hold off IV antibiotics for now  CRP/ferritin q3days    DM: will hold PO home meds (Janumet 50/100 mg BID)  insulin sliding scale    Nutrition: nutrition consult; added MVI and glucerna 4/13/20    HTN: resumed Norvasc 5 mg 4/11/20,  cont to hold Lisinopril 20 mg unless further BP control needed    Depression/anxiety: c/w Sertraline 50 mg qdaily    Conditioning: PT consult underway    DVT prophylaxis: SQ heparin

## 2020-04-20 NOTE — CHART NOTE - NSCHARTNOTEFT_GEN_A_CORE
Evening orthostatics done.  remain orthostatic and symptomatic per RN and TIMBO Blancas.  already on Midodrine 5 mg TID, Florinef 0.2 mg qdaily, started this week so will hold on further increasing dose.     will give NS 75 cc x 24 hrs.  will check TTE given she presented with syncope.    will repeat orthostatics tomorrow.    - Dr. JUAN JOSE Leach ([facility])  - [phone] Evening orthostatics done.  remain orthostatic and symptomatic per RN and TIMBO Blancas.  already on Midodrine 5 mg TID, Florinef 0.2 mg qdaily. meds started this week and systolic borderline 160s so will hold on further increasing dose.     will give NS 75 cc x 24 hrs.  will check TTE given she presented with syncope.    will repeat orthostatics tomorrow.    - Dr. JUAN JOSE Leach ([facility])  - [phone]

## 2020-04-21 ENCOUNTER — TRANSCRIPTION ENCOUNTER (OUTPATIENT)
Age: 80
End: 2020-04-21

## 2020-04-21 LAB
ALBUMIN SERPL ELPH-MCNC: 3.3 G/DL — SIGNIFICANT CHANGE UP (ref 3.3–5)
ALP SERPL-CCNC: 99 U/L — SIGNIFICANT CHANGE UP (ref 40–120)
ALT FLD-CCNC: 10 U/L — SIGNIFICANT CHANGE UP (ref 4–33)
ANION GAP SERPL CALC-SCNC: 11 MMO/L — SIGNIFICANT CHANGE UP (ref 7–14)
AST SERPL-CCNC: 14 U/L — SIGNIFICANT CHANGE UP (ref 4–32)
BASOPHILS # BLD AUTO: 0.04 K/UL — SIGNIFICANT CHANGE UP (ref 0–0.2)
BASOPHILS NFR BLD AUTO: 0.6 % — SIGNIFICANT CHANGE UP (ref 0–2)
BILIRUB SERPL-MCNC: 0.4 MG/DL — SIGNIFICANT CHANGE UP (ref 0.2–1.2)
BUN SERPL-MCNC: 9 MG/DL — SIGNIFICANT CHANGE UP (ref 7–23)
CALCIUM SERPL-MCNC: 9.7 MG/DL — SIGNIFICANT CHANGE UP (ref 8.4–10.5)
CHLORIDE SERPL-SCNC: 105 MMOL/L — SIGNIFICANT CHANGE UP (ref 98–107)
CO2 SERPL-SCNC: 25 MMOL/L — SIGNIFICANT CHANGE UP (ref 22–31)
CREAT SERPL-MCNC: 0.73 MG/DL — SIGNIFICANT CHANGE UP (ref 0.5–1.3)
EOSINOPHIL # BLD AUTO: 0.13 K/UL — SIGNIFICANT CHANGE UP (ref 0–0.5)
EOSINOPHIL NFR BLD AUTO: 1.9 % — SIGNIFICANT CHANGE UP (ref 0–6)
FERRITIN SERPL-MCNC: 334.6 NG/ML — HIGH (ref 15–150)
GLUCOSE SERPL-MCNC: 103 MG/DL — HIGH (ref 70–99)
HCT VFR BLD CALC: 33.8 % — LOW (ref 34.5–45)
HGB BLD-MCNC: 10.9 G/DL — LOW (ref 11.5–15.5)
IMM GRANULOCYTES NFR BLD AUTO: 0.3 % — SIGNIFICANT CHANGE UP (ref 0–1.5)
LYMPHOCYTES # BLD AUTO: 1.93 K/UL — SIGNIFICANT CHANGE UP (ref 1–3.3)
LYMPHOCYTES # BLD AUTO: 28.4 % — SIGNIFICANT CHANGE UP (ref 13–44)
MAGNESIUM SERPL-MCNC: 1.7 MG/DL — SIGNIFICANT CHANGE UP (ref 1.6–2.6)
MCHC RBC-ENTMCNC: 26.1 PG — LOW (ref 27–34)
MCHC RBC-ENTMCNC: 32.2 % — SIGNIFICANT CHANGE UP (ref 32–36)
MCV RBC AUTO: 80.9 FL — SIGNIFICANT CHANGE UP (ref 80–100)
MONOCYTES # BLD AUTO: 0.86 K/UL — SIGNIFICANT CHANGE UP (ref 0–0.9)
MONOCYTES NFR BLD AUTO: 12.6 % — SIGNIFICANT CHANGE UP (ref 2–14)
NEUTROPHILS # BLD AUTO: 3.82 K/UL — SIGNIFICANT CHANGE UP (ref 1.8–7.4)
NEUTROPHILS NFR BLD AUTO: 56.2 % — SIGNIFICANT CHANGE UP (ref 43–77)
NRBC # FLD: 0 K/UL — SIGNIFICANT CHANGE UP (ref 0–0)
PLATELET # BLD AUTO: 373 K/UL — SIGNIFICANT CHANGE UP (ref 150–400)
PMV BLD: 11 FL — SIGNIFICANT CHANGE UP (ref 7–13)
POTASSIUM SERPL-MCNC: 3.6 MMOL/L — SIGNIFICANT CHANGE UP (ref 3.5–5.3)
POTASSIUM SERPL-SCNC: 3.6 MMOL/L — SIGNIFICANT CHANGE UP (ref 3.5–5.3)
PROT SERPL-MCNC: 7.2 G/DL — SIGNIFICANT CHANGE UP (ref 6–8.3)
RBC # BLD: 4.18 M/UL — SIGNIFICANT CHANGE UP (ref 3.8–5.2)
RBC # FLD: 14.1 % — SIGNIFICANT CHANGE UP (ref 10.3–14.5)
SODIUM SERPL-SCNC: 141 MMOL/L — SIGNIFICANT CHANGE UP (ref 135–145)
WBC # BLD: 6.8 K/UL — SIGNIFICANT CHANGE UP (ref 3.8–10.5)
WBC # FLD AUTO: 6.8 K/UL — SIGNIFICANT CHANGE UP (ref 3.8–10.5)

## 2020-04-21 PROCEDURE — 93306 TTE W/DOPPLER COMPLETE: CPT | Mod: 26

## 2020-04-21 RX ADMIN — MIDODRINE HYDROCHLORIDE 5 MILLIGRAM(S): 2.5 TABLET ORAL at 04:37

## 2020-04-21 RX ADMIN — MIDODRINE HYDROCHLORIDE 5 MILLIGRAM(S): 2.5 TABLET ORAL at 16:43

## 2020-04-21 RX ADMIN — Medication 81 MILLIGRAM(S): at 12:28

## 2020-04-21 RX ADMIN — SERTRALINE 50 MILLIGRAM(S): 25 TABLET, FILM COATED ORAL at 12:29

## 2020-04-21 RX ADMIN — ENOXAPARIN SODIUM 40 MILLIGRAM(S): 100 INJECTION SUBCUTANEOUS at 12:28

## 2020-04-21 RX ADMIN — Medication 1 TABLET(S): at 12:29

## 2020-04-21 RX ADMIN — FLUDROCORTISONE ACETATE 0.2 MILLIGRAM(S): 0.1 TABLET ORAL at 04:37

## 2020-04-21 NOTE — DISCHARGE NOTE PROVIDER - CARE PROVIDER_API CALL
Shane Carlos)  Internal Medicine  [address] [place] [address], [address]  [place] [address]  Phone: [phone]  Fax: [phone]  Follow Up Time:

## 2020-04-21 NOTE — PROVIDER CONTACT NOTE (OTHER) - ACTION/TREATMENT ORDERED:
Continue orthostatics q12.
Continue to monitor patient
Continue to monitor patient on telemetry
Magalis aware, will stop IVF as discussed.
Continue monitor patient.
Will continue to monitor, follow up recommendations

## 2020-04-21 NOTE — DISCHARGE NOTE PROVIDER - HOSPITAL COURSE
78 y/o female with PMHx of advanced dementia, DM and HTN who presented to the ER for multiple complaints. Pt is relatively poor historian, slow to answer questions, admitted for failure to thrive, poor PO intake, multiple episodes of syncope, followed by "body twitching", with bowel and bladder incontinence, followed by episode of lethargy, blank stare. 	    1. Syncope vs seizure: As per daughter, multiple syncopal episodes with body twitching, 4 episodes total. Patient saw neurology 1 year ago, and was told she had an old CVA with normal EEG at that time. A&Ox3 at baseline but forgetful due to advanced dementia. Her head CT was negative on admission. Chest CT showed ground glass opacities with COVID positive test. Telemetry showed bradycardia without pauses. Seen by cardiology and no echo needed. Troponins negative x2 and EKG ok. No inpatient neurology work up. Patient was noted to be orthostatic. Received IVF, started on Midodrine and Florinef. Resolved.         2. COVID positive: 100% on RA. Plaquenil held given low oxygen requirements.         3. HTN: Resumed Norvasc, Lisinopril held.         PT: Home no needs.         Patient seen and evaluated, no acute somatic complaints. Reviewed discharge medications with patient; All new medications requiring new prescriptions were sent to the pharmacy of patient's choice. Reviewed need for prescription for previous home medications and new prescriptions sent if requested. Medically cleared/stable for discharge as per Dr. Carlos with close outpatient follow up within 1-2 weeks of discharge. Patient understands and agrees with plan of care.

## 2020-04-21 NOTE — PROGRESS NOTE ADULT - ATTENDING COMMENTS
- Dr. JUAN JOSE Leach ([facility])  - [phone]
- Dr. JUAN JOSE Leach ([facility])  - [phone]
- Dr. JUAN JOSE Leach ([facility])  - [phone]
- Dr. JUAN JOSE Leach ([facility])  - [phone]
dc planning home if her orthostatic symptoms (dizziness on standing) are tolerable.   Physical therapy. Out of bed to chair with assistance.     - Dr. JUAN JOSE Leach ([facility])  - [phone]
dc planning home if her orthostatic symptoms (dizziness on standing) are tolerable.   Physical therapy. Out of bed to chair with assistance.     Shane Best will be covering for me starting 4/22/20. He can be reached at  if needed.     - [facility])  - [phone]
dc planning home if her orthostatic symptoms (dizziness on standing) is tolerable.     - Dr. JUAN JOSE Leach ([facility])  - [phone]
The patient's care was discussed with the consulting cardiology fellow.  Telemetry was reviewed. Sinus bradycardia was noted without evidence of missed beats or high degrees of heart block.  I agree with the updated assessment and care plan.    Reviewed the development of bradycardia in the setting of orthostatics testing with EP.   This may indeed represent a cardioinhibitory response, similar to that seen during tilt table testing.    No additional interventions are required at this time. If outside of the context of acute febrile illness the patient has recurrent syncope, formal tilt table testing and evaluation by EP may be appropriate.     Haider Sharp MD  Cardiology

## 2020-04-21 NOTE — PROGRESS NOTE ADULT - PROVIDER SPECIALTY LIST ADULT
Cardiology
Internal Medicine

## 2020-04-21 NOTE — PROGRESS NOTE ADULT - ASSESSMENT
80 y/o female PMHx of advanced dementia, DM, HTN presents to ER for multiple complaints. Pt is relatively poor historian, slow to answer questions, admitted for failure to thrive, poor PO intake, multiple episodes of syncope, followed by "body twitching", with bowel and bladder incontinence, followed by episode of lethargy, blank stare. 	    # Syncope, r/o Seizure vs. infectious etiology vs. orthostatic hypotension  # Ground glass opacities on CT chest  # Dementia  # DM/HTN    Plan:  Syncope >> seizure >> cardiac event (less likely) vs. orthostatic hypotension   Per the daughters, syncopal episodes with body twitching, becoming more frequent this month. 4 episodes total.  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 EEG were okay at that time.   Pt requires help with ADLs including medication administration. AAO x 3 baseline but forgetful due to advanced dementia  on admission pt with very poor PO intake, sleeping most of the day. no fever no cough.  In ED, pt had multiple CTs, no fractures. Noted ground glass opacities on CT chest, COVID19 ended up (+)  The daughter Aneta was tested positive but she was self isolating at home. no significant interaction per the daughter.   tele monitor only revealing occasional sinus katy without any significant pauses, no need for TTE as per house cardiology, trop neg x 2, EKG unimpressive.  appreciate neuro input -> so far EEG not revealing of any seizure-like waveforms -> stopped EEG 4/11/20  no further neuro/cardiac inpt workup  Orthostatic hypotension -> see below    Orthostatic hypotension  (no HA/dizziness reported per pt. however she remains orthostatic despite IVF x 2-3 days)  per RN, pt was dizzy when sitting up. midodrine 2.5 mg TID started increased to 5 mg TID for symptomatic orthostatic hypotension)  repeat orthostatic still positive with pre-syncopal event  so started florinef 0.2 qdaily. symptoms improving overall, supine HTN limit further increase.   orthostatic vitals are improving though still positive.   TTE is unimpressive. tele no events  await repeat BPs  await Physical therapy.   dc planning home. Discussed with the daughter Georgie. fall precautions advised.   --------------------------------------------------------------------------------------------------------    COVID (+): so far 100% on RA, but GCO on CT chest  Holding off Plaquenil for now  Will hold off IV antibiotics for now  no further need to trend CRP/ferritin    DM: will hold PO home meds (Janumet 50/100 mg BID)  insulin sliding scale    Nutrition: nutrition consult; added MVI and glucerna 4/13/20    HTN: resumed Norvasc 5 mg 4/11/20,  cont to hold Lisinopril 20 mg unless further BP control needed    Depression/anxiety: c/w Sertraline 50 mg qdaily    Conditioning: PT consult underway    DVT prophylaxis: SQ heparin

## 2020-04-21 NOTE — DISCHARGE NOTE PROVIDER - NSDCFUADDINST_GEN_ALL_CORE_FT
To prevent spread, please stay home and do not leave except to get medical care. Do not visit public areas. Avoid using public transportation, ride-sharing or taxis. Stay away from others as much as possible. While at home, if possible, stay home in a "specific room". If this is not possible, try and keep a safe distance from other people (at least 6 feet away). Use a separate bathroom if available. Cover your mouth and nose with a  tissue when you cough or sneeze. Wash your hands immediately after for at least 20 seconds. Wash your hands often. This is important after blowing your nose, coughing, sneezing, going to the bathroom, before eating or after preparing food. Avoid sharing dishes, drinking glasses, cups, eating utensils, towels or bedding with other people in your home. Clean and disinfect all high-touch surfaces everyday. High touch surfaces include phones, remote controls, counters, tabletops, doorknobs, bathroom fixtures, toilets, keyboards, tablets, and bedside tables.   If you develop emergency warning signs, such as high grade fever, shortness of breath, difficulty breathing, persistent pain, pressure in the chest, new confusion, bluish lips, or if any of your symptoms begin to worsen, call your medical provider or return to the nearest emergency department.

## 2020-04-21 NOTE — DISCHARGE NOTE PROVIDER - NSDCMRMEDTOKEN_GEN_ALL_CORE_FT
amLODIPine 5 mg oral tablet: 1 tab(s) orally once a day  Aspir 81 oral delayed release tablet: 1 tab(s) orally once a day  Janumet 50 mg-1000 mg oral tablet: 1 tab(s) orally 2 times a day  lisinopril 20 mg oral tablet: 1 tab(s) orally once a day  pioglitazone 15 mg oral tablet: 1 tab(s) orally once a day  sertraline 50 mg oral tablet: 1 tab(s) orally once a day Aspir 81 oral delayed release tablet: 1 tab(s) orally once a day  fludrocortisone 0.1 mg oral tablet: 2 tab(s) orally once a day  Janumet 50 mg-1000 mg oral tablet: 1 tab(s) orally 2 times a day  midodrine 5 mg oral tablet: 1 tab(s) orally 3 times a day  pioglitazone 15 mg oral tablet: 1 tab(s) orally once a day  Prinivil 20 mg oral tablet: 1 tab(s) orally once a day  sertraline 50 mg oral tablet: 1 tab(s) orally once a day

## 2020-04-21 NOTE — PROGRESS NOTE ADULT - SUBJECTIVE AND OBJECTIVE BOX
Patient is a 79y old  Female who presents with a chief complaint of ?syncope (10 Apr 2020 08:20)      SUBJECTIVE / OVERNIGHT EVENTS:  pre-syncopal episode this am while standing up  orthostatic is positive  midodrine increased to 5 mg   currently pt feels well  she has no recollection of events this am  the daughter confirmed dementia  no cp, no sob, no n/v/d. no abdominal pain.  no headache, no dizziness.         Vital Signs Last 24 Hrs  T(C): 36.2 (17 Apr 2020 12:50), Max: 36.8 (17 Apr 2020 05:10)  T(F): 97.2 (17 Apr 2020 12:50), Max: 98.3 (17 Apr 2020 05:10)  HR: 72 (17 Apr 2020 12:50) (63 - 72)  BP: 142/65 (17 Apr 2020 12:50) (142/65 - 149/54)  BP(mean): --  RR: 17 (17 Apr 2020 12:50) (16 - 17)  SpO2: 100% (17 Apr 2020 12:50) (98% - 100%)  I&O's Summary    16 Apr 2020 07:01  -  17 Apr 2020 07:00  --------------------------------------------------------  IN: 120 mL / OUT: 0 mL / NET: 120 mL      PHYSICAL EXAM:  GENERAL: NAD, Comfortable on room air  HEAD:  Atraumatic, Normocephalic  EYES: EOMI, PERRLA, conjunctiva and sclera clear  NECK: Supple, No JVD  CHEST/LUNG: Clear to auscultation bilaterally; No wheeze  HEART: Regular rate and rhythm; No murmurs, rubs, or gallops  ABDOMEN: Soft, Nontender, Nondistended; Bowel sounds present  Neuro: AAO x 3, very forgetful, +dementia, no focal deficit, 5/5 b/l extremities  EXTREMITIES:  2+ Peripheral Pulses, No clubbing, cyanosis, or edema  SKIN: No rashes or lesions    LABS:                        11.2   7.50  )-----------( 438      ( 17 Apr 2020 06:17 )             34.6     04-17    140  |  103  |  7   ----------------------------<  107<H>  3.4<L>   |  24  |  0.65    Ca    9.9      17 Apr 2020 06:17  Phos  2.4     04-17  Mg     1.5     04-17        CAPILLARY BLOOD GLUCOSE      POCT Blood Glucose.: 134 mg/dL (17 Apr 2020 16:53)  POCT Blood Glucose.: 135 mg/dL (17 Apr 2020 12:18)  POCT Blood Glucose.: 96 mg/dL (17 Apr 2020 08:54)  POCT Blood Glucose.: 89 mg/dL (16 Apr 2020 21:44)            RADIOLOGY & ADDITIONAL TESTS:    Imaging Personally Reviewed:  [x] YES  [ ] NO    Consultant(s) Notes Reviewed:  [x] YES  [ ] NO      MEDICATIONS  (STANDING):  aspirin enteric coated 81 milliGRAM(s) Oral daily  dextrose 5%. 1000 milliLiter(s) (50 mL/Hr) IV Continuous <Continuous>  dextrose 50% Injectable 12.5 Gram(s) IV Push once  dextrose 50% Injectable 25 Gram(s) IV Push once  dextrose 50% Injectable 25 Gram(s) IV Push once  enoxaparin Injectable 40 milliGRAM(s) SubCutaneous daily  insulin lispro (HumaLOG) corrective regimen sliding scale   SubCutaneous three times a day before meals  midodrine. 5 milliGRAM(s) Oral three times a day  multivitamin 1 Tablet(s) Oral daily  sertraline 50 milliGRAM(s) Oral daily  sodium chloride 0.9%. 1000 milliLiter(s) (100 mL/Hr) IV Continuous <Continuous>    MEDICATIONS  (PRN):  dextrose 40% Gel 15 Gram(s) Oral once PRN Blood Glucose LESS THAN 70 milliGRAM(s)/deciliter  glucagon  Injectable 1 milliGRAM(s) IntraMuscular once PRN Glucose LESS THAN 70 milligrams/deciliter  LORazepam   Injectable 2 milliGRAM(s) IV Push every 6 hours PRN Agitation      Care Discussed with Consultants/Other Providers [x] YES  [ ] NO    HEALTH ISSUES - PROBLEM Dx:
No fevers or chills overnight  Saturating 100% on RA    Vital Signs Last 24 Hrs  T(C): 36.7 (08 Apr 2020 12:13), Max: 36.8 (08 Apr 2020 00:38)  T(F): 98.1 (08 Apr 2020 12:13), Max: 98.2 (08 Apr 2020 00:38)  HR: 73 (08 Apr 2020 12:13) (62 - 85)  BP: 128/68 (08 Apr 2020 12:13) (114/53 - 144/62)  BP(mean): --  RR: 18 (08 Apr 2020 12:13) (16 - 18)  SpO2: 100% (08 Apr 2020 12:13) (96% - 100%)    GENERAL: NAD, well-developed, comfortable, on room air, slow to react, poor historian  HEAD:  Atraumatic, Normocephalic  EYES: EOMI, PERRLA, conjunctiva and sclera clear, +left eye ecchomosis  NECK: Supple, No JVD  CHEST/LUNG: mild decrease breath sounds bilaterally; No wheeze   HEART: Regular rate and rhythm; No murmurs, rubs, or gallops  ABDOMEN: Soft, Nontender, Nondistended; Bowel sounds present  Neuro: AAOx2, slow to react, poor historian, no focal weakness, 5/5 b/l extremity strength  EXTREMITIES:  2+ Peripheral Pulses, No clubbing, cyanosis, or edema  SKIN: No rashes or lesions    LABS:                        12.0   7.47  )-----------( 306      ( 08 Apr 2020 07:18 )             37.0     04-08    137  |  99  |  21  ----------------------------<  73  4.3   |  26  |  0.95    Ca    10.2      08 Apr 2020 07:18    TPro  7.9  /  Alb  4.0  /  TBili  0.6  /  DBili  x   /  AST  18  /  ALT  9   /  AlkPhos  57  04-08    PT/INR - ( 07 Apr 2020 13:00 )   PT: 13.5 SEC;   INR: 1.18          PTT - ( 07 Apr 2020 13:00 )  PTT:27.8 SEC  CAPILLARY BLOOD GLUCOSE      POCT Blood Glucose.: 86 mg/dL (07 Apr 2020 21:58)    CARDIAC MARKERS ( 07 Apr 2020 13:00 )  x     / x     / 101 u/L / x     / x
No longer orthostatic  EEG so far showing mild diffuse slowing, but no epileptiform activity    Vital Signs Last 24 Hrs  T(C): 36.6 (11 Apr 2020 11:02), Max: 36.9 (10 Apr 2020 22:55)  T(F): 97.9 (11 Apr 2020 11:02), Max: 98.4 (10 Apr 2020 22:55)  HR: 78 (11 Apr 2020 11:02) (60 - 78)  BP: 145/70 (11 Apr 2020 11:02) (145/70 - 173/63)  BP(mean): --  RR: 16 (11 Apr 2020 11:02) (16 - 16)  SpO2: 98% (11 Apr 2020 11:02) (98% - 98%)    GENERAL: NAD, well-developed, comfortable, on room air, slow to react, poor historian  HEAD:  Atraumatic, Normocephalic  EYES: EOMI, PERRLA, conjunctiva and sclera clear, +left eye ecchomosis  NECK: Supple, No JVD  CHEST/LUNG: mild decrease breath sounds bilaterally; No wheeze   HEART: Regular rate and rhythm; No murmurs, rubs, or gallops  ABDOMEN: Soft, Nontender, Nondistended; Bowel sounds present  Neuro: AAOx2, slow to react, poor historian, no focal weakness, 5/5 b/l extremity strength  EXTREMITIES:  2+ Peripheral Pulses, No clubbing, cyanosis, or edema  SKIN: No rashes or lesions    LABS:                        12.1   8.95  )-----------( 337      ( 10 Apr 2020 04:35 )             37.9     04-10    145  |  102  |  17  ----------------------------<  71  3.9   |  20<L>  |  0.68    Ca    10.2      10 Apr 2020 04:35  Phos  2.2     04-10  Mg     1.9     04-10    TPro  7.7  /  Alb  3.9  /  TBili  0.8  /  DBili  x   /  AST  16  /  ALT  7   /  AlkPhos  69  04-10      CAPILLARY BLOOD GLUCOSE      POCT Blood Glucose.: 98 mg/dL (11 Apr 2020 12:03)  POCT Blood Glucose.: 77 mg/dL (11 Apr 2020 08:55)  POCT Blood Glucose.: 80 mg/dL (10 Apr 2020 16:56)  POCT Blood Glucose.: 80 mg/dL (10 Apr 2020 12:35)
No longer orthostatic  Saturating well on RA    Vital Signs Last 24 Hrs  T(C): 36.6 (12 Apr 2020 04:49), Max: 36.6 (11 Apr 2020 11:02)  T(F): 97.8 (12 Apr 2020 04:49), Max: 97.9 (11 Apr 2020 11:02)  HR: 75 (12 Apr 2020 04:49) (73 - 78)  BP: 138/74 (12 Apr 2020 04:49) (138/74 - 152/74)  BP(mean): --  RR: 18 (12 Apr 2020 04:49) (16 - 18)  SpO2: 97% (12 Apr 2020 04:49) (97% - 98%)    GENERAL: NAD, well-developed, comfortable, on room air, slow to react, poor historian  HEAD:  Atraumatic, Normocephalic  EYES: EOMI, PERRLA, conjunctiva and sclera clear, +left eye ecchomosis  NECK: Supple, No JVD  CHEST/LUNG: mild decrease breath sounds bilaterally; No wheeze   HEART: Regular rate and rhythm; No murmurs, rubs, or gallops  ABDOMEN: Soft, Nontender, Nondistended; Bowel sounds present  Neuro: AAOx2, slow to react, poor historian, no focal weakness, 5/5 b/l extremity strength  EXTREMITIES:  2+ Peripheral Pulses, No clubbing, cyanosis, or edema  SKIN: No rashes or lesions    LABS:                        11.9   7.96  )-----------( 396      ( 12 Apr 2020 05:55 )             37.2             CAPILLARY BLOOD GLUCOSE      POCT Blood Glucose.: 97 mg/dL (12 Apr 2020 08:38)  POCT Blood Glucose.: 81 mg/dL (11 Apr 2020 22:40)  POCT Blood Glucose.: 99 mg/dL (11 Apr 2020 16:49)  POCT Blood Glucose.: 98 mg/dL (11 Apr 2020 12:03)
No overnight acute desaturation or SOB  Saturating 97%% on RA    Vital Signs Last 24 Hrs  T(C): 36.6 (09 Apr 2020 04:41), Max: 36.7 (08 Apr 2020 12:13)  T(F): 97.9 (09 Apr 2020 04:41), Max: 98.1 (08 Apr 2020 12:13)  HR: 75 (09 Apr 2020 04:41) (73 - 75)  BP: 130/55 (09 Apr 2020 04:41) (128/68 - 130/55)  BP(mean): --  RR: 18 (09 Apr 2020 04:41) (18 - 18)  SpO2: 98% (09 Apr 2020 04:41) (98% - 100%)    GENERAL: NAD, well-developed, comfortable, on room air, slow to react, poor historian  HEAD:  Atraumatic, Normocephalic  EYES: EOMI, PERRLA, conjunctiva and sclera clear, +left eye ecchomosis  NECK: Supple, No JVD  CHEST/LUNG: mild decrease breath sounds bilaterally; No wheeze   HEART: Regular rate and rhythm; No murmurs, rubs, or gallops  ABDOMEN: Soft, Nontender, Nondistended; Bowel sounds present  Neuro: AAOx2, slow to react, poor historian, no focal weakness, 5/5 b/l extremity strength  EXTREMITIES:  2+ Peripheral Pulses, No clubbing, cyanosis, or edema  SKIN: No rashes or lesions        LABS:                        12.0   7.42  )-----------( 320      ( 09 Apr 2020 06:30 )             37.5     04-09    135  |  96<L>  |  21  ----------------------------<  107<H>  3.7   |  23  |  0.95    Ca    10.0      09 Apr 2020 06:30    TPro  7.5  /  Alb  4.0  /  TBili  0.7  /  DBili  x   /  AST  16  /  ALT  8   /  AlkPhos  60  04-09    PT/INR - ( 07 Apr 2020 13:00 )   PT: 13.5 SEC;   INR: 1.18          PTT - ( 07 Apr 2020 13:00 )  PTT:27.8 SEC  CAPILLARY BLOOD GLUCOSE      POCT Blood Glucose.: 93 mg/dL (09 Apr 2020 08:48)  POCT Blood Glucose.: 81 mg/dL (08 Apr 2020 22:21)  POCT Blood Glucose.: 90 mg/dL (08 Apr 2020 16:52)    CARDIAC MARKERS ( 07 Apr 2020 13:00 )  x     / x     / 101 u/L / x     / x
Orthostatic again this morning -> further IVF ordered  Saturating well on RA    Vital Signs Last 24 Hrs  T(C): 36.6 (14 Apr 2020 11:13), Max: 36.6 (14 Apr 2020 11:13)  T(F): 97.8 (14 Apr 2020 11:13), Max: 97.8 (14 Apr 2020 11:13)  HR: 81 (14 Apr 2020 11:13) (66 - 81)  BP: 122/63 (14 Apr 2020 11:13) (122/63 - 137/71)  BP(mean): --  RR: 17 (14 Apr 2020 11:13) (17 - 18)  SpO2: 100% (14 Apr 2020 11:13) (99% - 100%)    GENERAL: NAD, well-developed, comfortable, on room air, slow to react, poor historian  HEAD:  Atraumatic, Normocephalic  EYES: EOMI, PERRLA, conjunctiva and sclera clear, +left eye ecchomosis  NECK: Supple, No JVD  CHEST/LUNG: mild decrease breath sounds bilaterally; No wheeze   HEART: Regular rate and rhythm; No murmurs, rubs, or gallops  ABDOMEN: Soft, Nontender, Nondistended; Bowel sounds present  Neuro: AAOx2, slow to react, poor historian, no focal weakness, 5/5 b/l extremity strength  EXTREMITIES:  2+ Peripheral Pulses, No clubbing, cyanosis, or edema  SKIN: No rashes or lesions    LABS:    04-14    136  |  98  |  13  ----------------------------<  75  3.9   |  27  |  0.74    Ca    10.3      14 Apr 2020 06:56  Phos  2.5     04-14  Mg     1.7     04-14        CAPILLARY BLOOD GLUCOSE      POCT Blood Glucose.: 161 mg/dL (14 Apr 2020 12:12)  POCT Blood Glucose.: 85 mg/dL (14 Apr 2020 08:40)  POCT Blood Glucose.: 103 mg/dL (13 Apr 2020 16:53)
Orthostatic yesterday, on gentle IVF  EEG so far showing mild diffuse slowing, but no epileptiform activity    Vital Signs Last 24 Hrs  T(C): 36.8 (09 Apr 2020 23:14), Max: 36.8 (09 Apr 2020 23:14)  T(F): 98.2 (09 Apr 2020 23:14), Max: 98.2 (09 Apr 2020 23:14)  HR: 49 (10 Apr 2020 01:54) (49 - 69)  BP: 145/53 (09 Apr 2020 23:14) (126/65 - 145/53)  BP(mean): --  RR: 18 (09 Apr 2020 23:14) (17 - 18)  SpO2: 97% (09 Apr 2020 23:14) (97% - 98%)    GENERAL: NAD, well-developed, comfortable, on room air, slow to react, poor historian  HEAD:  Atraumatic, Normocephalic  EYES: EOMI, PERRLA, conjunctiva and sclera clear, +left eye ecchomosis  NECK: Supple, No JVD  CHEST/LUNG: mild decrease breath sounds bilaterally; No wheeze   HEART: Regular rate and rhythm; No murmurs, rubs, or gallops  ABDOMEN: Soft, Nontender, Nondistended; Bowel sounds present  Neuro: AAOx2, slow to react, poor historian, no focal weakness, 5/5 b/l extremity strength  EXTREMITIES:  2+ Peripheral Pulses, No clubbing, cyanosis, or edema  SKIN: No rashes or lesions    LABS:                        12.1   8.95  )-----------( 337      ( 10 Apr 2020 04:35 )             37.9     04-10    145  |  102  |  17  ----------------------------<  71  3.9   |  20<L>  |  0.68    Ca    10.2      10 Apr 2020 04:35  Phos  2.2     04-10  Mg     1.9     04-10    TPro  7.7  /  Alb  3.9  /  TBili  0.8  /  DBili  x   /  AST  16  /  ALT  7   /  AlkPhos  69  04-10      CAPILLARY BLOOD GLUCOSE      POCT Blood Glucose.: 89 mg/dL (10 Apr 2020 08:52)  POCT Blood Glucose.: 112 mg/dL (09 Apr 2020 17:01)  POCT Blood Glucose.: 133 mg/dL (09 Apr 2020 12:12)
PO intake remains suboptimal, this has been going on prior to admission as per daughter over phone  Saturating well on RA    Vital Signs Last 24 Hrs  T(C): 36.4 (12 Apr 2020 12:34), Max: 36.4 (12 Apr 2020 12:34)  T(F): 97.6 (12 Apr 2020 12:34), Max: 97.6 (12 Apr 2020 12:34)  HR: 70 (13 Apr 2020 06:12) (70 - 87)  BP: 129/63 (13 Apr 2020 06:12) (129/63 - 130/69)  BP(mean): --  RR: 17 (12 Apr 2020 12:34) (17 - 17)  SpO2: 99% (12 Apr 2020 12:34) (99% - 99%)    GENERAL: NAD, well-developed, comfortable, on room air, slow to react, poor historian  HEAD:  Atraumatic, Normocephalic  EYES: EOMI, PERRLA, conjunctiva and sclera clear, +left eye ecchomosis  NECK: Supple, No JVD  CHEST/LUNG: mild decrease breath sounds bilaterally; No wheeze   HEART: Regular rate and rhythm; No murmurs, rubs, or gallops  ABDOMEN: Soft, Nontender, Nondistended; Bowel sounds present  Neuro: AAOx2, slow to react, poor historian, no focal weakness, 5/5 b/l extremity strength  EXTREMITIES:  2+ Peripheral Pulses, No clubbing, cyanosis, or edema  SKIN: No rashes or lesions    LABS:                        11.9   7.96  )-----------( 396      ( 12 Apr 2020 05:55 )             37.2     04-13    136  |  98  |  14  ----------------------------<  80  3.9   |  24  |  0.74    Ca    10.2      13 Apr 2020 07:45  Phos  2.4     04-13  Mg     1.8     04-13    TPro  7.7  /  Alb  3.4  /  TBili  0.7  /  DBili  x   /  AST  13  /  ALT  8   /  AlkPhos  71  04-12      CAPILLARY BLOOD GLUCOSE      POCT Blood Glucose.: 86 mg/dL (13 Apr 2020 08:43)  POCT Blood Glucose.: 83 mg/dL (12 Apr 2020 16:52)  POCT Blood Glucose.: 106 mg/dL (12 Apr 2020 12:12)
Patient is a 79y old  Female who presents with a chief complaint of ?syncope (10 Apr 2020 08:20)      SUBJECTIVE / OVERNIGHT EVENTS:  BP getting high  IVF stopped early  pt feels well  able to sit up without issues  no dizziness  TTE is unimpressive  tele no events  await repeat BPs  await Physical therapy.       Vital Signs Last 24 Hrs  T(C): 36.6 (21 Apr 2020 12:20), Max: 36.7 (21 Apr 2020 04:32)  T(F): 97.8 (21 Apr 2020 12:20), Max: 98 (21 Apr 2020 04:32)  HR: 58 (21 Apr 2020 12:20) (58 - 58)  BP: 175/66 (21 Apr 2020 12:20) (144/76 - 175/66)  BP(mean): --  RR: 18 (21 Apr 2020 12:20) (18 - 18)  SpO2: 100% (21 Apr 2020 12:20) (100% - 100%)  I&O's Summary    20 Apr 2020 07:01  -  21 Apr 2020 07:00  --------------------------------------------------------  IN: 120 mL / OUT: 700 mL / NET: -580 mL      PHYSICAL EXAM:  GENERAL: NAD, Comfortable on room air  HEAD:  Atraumatic, Normocephalic  EYES: EOMI, PERRLA, conjunctiva and sclera clear  NECK: Supple, No JVD  CHEST/LUNG: Clear to auscultation bilaterally; No wheeze  HEART: Regular rate and rhythm; No murmurs, rubs, or gallops  ABDOMEN: Soft, Nontender, Nondistended; Bowel sounds present  Neuro: AAO x 3, very forgetful, +dementia, no focal deficit, 5/5 b/l extremities  EXTREMITIES:  2+ Peripheral Pulses, No clubbing, cyanosis, or edema  SKIN: No rashes or lesions      LABS:                        10.9   6.80  )-----------( 373      ( 21 Apr 2020 05:56 )             33.8     04-21    141  |  105  |  9   ----------------------------<  103<H>  3.6   |  25  |  0.73    Ca    9.7      21 Apr 2020 05:56  Phos  2.9     04-20  Mg     1.7     04-21    TPro  7.2  /  Alb  3.3  /  TBili  0.4  /  DBili  x   /  AST  14  /  ALT  10  /  AlkPhos  99  04-21      CAPILLARY BLOOD GLUCOSE      POCT Blood Glucose.: 122 mg/dL (21 Apr 2020 11:57)  POCT Blood Glucose.: 97 mg/dL (21 Apr 2020 08:33)  POCT Blood Glucose.: 142 mg/dL (20 Apr 2020 21:46)  POCT Blood Glucose.: 132 mg/dL (20 Apr 2020 16:34)            RADIOLOGY & ADDITIONAL TESTS:    Imaging Personally Reviewed:  [x] YES  [ ] NO    Consultant(s) Notes Reviewed:  [x] YES  [ ] NO      MEDICATIONS  (STANDING):  aspirin enteric coated 81 milliGRAM(s) Oral daily  dextrose 5%. 1000 milliLiter(s) (50 mL/Hr) IV Continuous <Continuous>  dextrose 50% Injectable 12.5 Gram(s) IV Push once  dextrose 50% Injectable 25 Gram(s) IV Push once  dextrose 50% Injectable 25 Gram(s) IV Push once  enoxaparin Injectable 40 milliGRAM(s) SubCutaneous daily  fludroCORTISONE 0.2 milliGRAM(s) Oral daily  insulin lispro (HumaLOG) corrective regimen sliding scale   SubCutaneous three times a day before meals  midodrine. 5 milliGRAM(s) Oral three times a day  multivitamin 1 Tablet(s) Oral daily  sertraline 50 milliGRAM(s) Oral daily    MEDICATIONS  (PRN):  dextrose 40% Gel 15 Gram(s) Oral once PRN Blood Glucose LESS THAN 70 milliGRAM(s)/deciliter  glucagon  Injectable 1 milliGRAM(s) IntraMuscular once PRN Glucose LESS THAN 70 milligrams/deciliter  LORazepam   Injectable 2 milliGRAM(s) IV Push every 6 hours PRN Agitation      Care Discussed with Consultants/Other Providers [x] YES  [ ] NO    HEALTH ISSUES - PROBLEM Dx:
Patient is a 79y old  Female who presents with a chief complaint of ?syncope (10 Apr 2020 08:20)      SUBJECTIVE / OVERNIGHT EVENTS:  No overnight events.  No complaints. feels well  No cp, sob, abdominal pain.  No n/v/d. no HA/dizziness reported per pt.   however she remains orthostatic despite IVF  per RN, pt was dizzy when sitting up.  midodrine 2.5 mg TID started for symptomatic orthostatic hypotension       Vital Signs Last 24 Hrs  T(C): 36.4 (16 Apr 2020 11:34), Max: 36.4 (16 Apr 2020 11:34)  T(F): 97.5 (16 Apr 2020 11:34), Max: 97.5 (16 Apr 2020 11:34)  HR: 95 (16 Apr 2020 11:34) (72 - 95)  BP: 141/71 (16 Apr 2020 11:34) (141/71 - 157/69)  BP(mean): --  RR: 17 (16 Apr 2020 11:34) (17 - 18)  SpO2: 100% (16 Apr 2020 11:34) (98% - 100%)  I&O's Summary    15 Apr 2020 07:01  -  16 Apr 2020 07:00  --------------------------------------------------------  IN: 200 mL / OUT: 300 mL / NET: -100 mL    16 Apr 2020 07:01  -  16 Apr 2020 20:30  --------------------------------------------------------  IN: 120 mL / OUT: 0 mL / NET: 120 mL        PHYSICAL EXAM:  GENERAL: NAD, Comfortable  HEAD:  Atraumatic, Normocephalic  EYES: EOMI, PERRLA, conjunctiva and sclera clear  NECK: Supple, No JVD  CHEST/LUNG: Clear to auscultation bilaterally; No wheeze  HEART: Regular rate and rhythm; No murmurs, rubs, or gallops  ABDOMEN: Soft, Nontender, Nondistended; Bowel sounds present  Neuro: AAO x 3, no focal deficit, 5/5 b/l extremities  EXTREMITIES:  2+ Peripheral Pulses, No clubbing, cyanosis, or edema  SKIN: No rashes or lesions    LABS:                        11.3   6.50  )-----------( 465      ( 16 Apr 2020 12:00 )             35.2     04-16    141  |  105  |  7   ----------------------------<  114<H>  4.0   |  26  |  0.67    Ca    10.0      16 Apr 2020 12:00  Phos  2.4     04-16  Mg     1.5     04-16        CAPILLARY BLOOD GLUCOSE      POCT Blood Glucose.: 97 mg/dL (16 Apr 2020 16:41)  POCT Blood Glucose.: 125 mg/dL (16 Apr 2020 11:32)  POCT Blood Glucose.: 92 mg/dL (16 Apr 2020 08:25)  POCT Blood Glucose.: 89 mg/dL (15 Apr 2020 21:29)            RADIOLOGY & ADDITIONAL TESTS:    Imaging Personally Reviewed:  [x] YES  [ ] NO    Consultant(s) Notes Reviewed:  [x] YES  [ ] NO      MEDICATIONS  (STANDING):  aspirin enteric coated 81 milliGRAM(s) Oral daily  dextrose 5%. 1000 milliLiter(s) (50 mL/Hr) IV Continuous <Continuous>  dextrose 50% Injectable 12.5 Gram(s) IV Push once  dextrose 50% Injectable 25 Gram(s) IV Push once  dextrose 50% Injectable 25 Gram(s) IV Push once  enoxaparin Injectable 40 milliGRAM(s) SubCutaneous daily  insulin lispro (HumaLOG) corrective regimen sliding scale   SubCutaneous three times a day before meals  midodrine. 5 milliGRAM(s) Oral three times a day  multivitamin 1 Tablet(s) Oral daily  sertraline 50 milliGRAM(s) Oral daily    MEDICATIONS  (PRN):  dextrose 40% Gel 15 Gram(s) Oral once PRN Blood Glucose LESS THAN 70 milliGRAM(s)/deciliter  glucagon  Injectable 1 milliGRAM(s) IntraMuscular once PRN Glucose LESS THAN 70 milligrams/deciliter  LORazepam   Injectable 2 milliGRAM(s) IV Push every 6 hours PRN Agitation      Care Discussed with Consultants/Other Providers [x] YES  [ ] NO    HEALTH ISSUES - PROBLEM Dx:
Patient is a 79y old  Female who presents with a chief complaint of ?syncope (10 Apr 2020 08:20)      SUBJECTIVE / OVERNIGHT EVENTS:  Pt seen and examined at bedside.   No overnight event.  Feeling better.   on gentle IVF for orthostasis   no cp, no sob, no n/v/d.         Vital Signs Last 24 Hrs  T(C): 36.4 (15 Apr 2020 12:36), Max: 36.4 (15 Apr 2020 12:36)  T(F): 97.5 (15 Apr 2020 12:36), Max: 97.5 (15 Apr 2020 12:36)  HR: 73 (15 Apr 2020 12:36) (73 - 75)  BP: 140/59 (15 Apr 2020 12:36) (132/67 - 140/59)  BP(mean): --  RR: 24 (15 Apr 2020 12:36) (17 - 24)  SpO2: 99% (15 Apr 2020 12:36) (97% - 99%)  I&O's Summary    14 Apr 2020 07:01  -  15 Apr 2020 07:00  --------------------------------------------------------  IN: 380 mL / OUT: 400 mL / NET: -20 mL    PHYSICAL EXAM:  GENERAL: NAD, well-developed, comfortable, on room air  HEAD:  Atraumatic, Normocephalic  EYES: EOMI, PERRLA, conjunctiva and sclera clear, +left eye ecchomosis  NECK: Supple, No JVD  CHEST/LUNG: mild decrease breath sounds bilaterally; No wheeze   HEART: Regular rate and rhythm; No murmurs, rubs, or gallops  ABDOMEN: Soft, Nontender, Nondistended; Bowel sounds present  Neuro: AAOx2-3,poor historian, no focal weakness, 5/5 b/l extremity strength  EXTREMITIES:  2+ Peripheral Pulses, No clubbing, cyanosis, or edema  SKIN: No rashes or lesions      LABS:    04-14    136  |  98  |  13  ----------------------------<  75  3.9   |  27  |  0.74    Ca    10.3      14 Apr 2020 06:56  Phos  2.5     04-14  Mg     1.7     04-14        CAPILLARY BLOOD GLUCOSE      POCT Blood Glucose.: 91 mg/dL (15 Apr 2020 16:52)  POCT Blood Glucose.: 110 mg/dL (15 Apr 2020 12:23)  POCT Blood Glucose.: 88 mg/dL (15 Apr 2020 08:46)  POCT Blood Glucose.: 113 mg/dL (14 Apr 2020 21:30)            RADIOLOGY & ADDITIONAL TESTS:    Imaging Personally Reviewed:  [x] YES  [ ] NO    Consultant(s) Notes Reviewed:  [x] YES  [ ] NO      MEDICATIONS  (STANDING):  aspirin enteric coated 81 milliGRAM(s) Oral daily  dextrose 5%. 1000 milliLiter(s) (50 mL/Hr) IV Continuous <Continuous>  dextrose 50% Injectable 12.5 Gram(s) IV Push once  dextrose 50% Injectable 25 Gram(s) IV Push once  dextrose 50% Injectable 25 Gram(s) IV Push once  enoxaparin Injectable 40 milliGRAM(s) SubCutaneous daily  insulin lispro (HumaLOG) corrective regimen sliding scale   SubCutaneous three times a day before meals  multivitamin 1 Tablet(s) Oral daily  sertraline 50 milliGRAM(s) Oral daily  sodium chloride 0.9%. 1000 milliLiter(s) (100 mL/Hr) IV Continuous <Continuous>    MEDICATIONS  (PRN):  dextrose 40% Gel 15 Gram(s) Oral once PRN Blood Glucose LESS THAN 70 milliGRAM(s)/deciliter  glucagon  Injectable 1 milliGRAM(s) IntraMuscular once PRN Glucose LESS THAN 70 milligrams/deciliter  LORazepam   Injectable 2 milliGRAM(s) IV Push every 6 hours PRN Agitation      Care Discussed with Consultants/Other Providers [x] YES  [ ] NO    HEALTH ISSUES - PROBLEM Dx:
Patient is a 79y old  Female who presents with a chief complaint of ?syncope (10 Apr 2020 08:20)      SUBJECTIVE / OVERNIGHT EVENTS:  orthostatic vitals are improving though still positive.  no cp, no sob, no n/v/d. no abdominal pain.  no headache, no dizziness.   pt reports no symptoms.      Vital Signs Last 24 Hrs  T(C): 36.6 (20 Apr 2020 12:25), Max: 36.6 (20 Apr 2020 12:25)  T(F): 97.9 (20 Apr 2020 12:25), Max: 97.9 (20 Apr 2020 12:25)  HR: 62 (20 Apr 2020 12:25) (61 - 100)  BP: 161/47 (20 Apr 2020 12:25) (120/60 - 161/47)  BP(mean): --  RR: 17 (20 Apr 2020 12:25) (17 - 18)  SpO2: 100% (20 Apr 2020 12:25) (98% - 100%)  I&O's Summary    19 Apr 2020 07:01  -  20 Apr 2020 07:00  --------------------------------------------------------  IN: 440 mL / OUT: 0 mL / NET: 440 mL    PHYSICAL EXAM:  GENERAL: NAD, Comfortable on room air  HEAD:  Atraumatic, Normocephalic  EYES: EOMI, PERRLA, conjunctiva and sclera clear  NECK: Supple, No JVD  CHEST/LUNG: Clear to auscultation bilaterally; No wheeze  HEART: Regular rate and rhythm; No murmurs, rubs, or gallops  ABDOMEN: Soft, Nontender, Nondistended; Bowel sounds present  Neuro: AAO x 3, very forgetful, +dementia, no focal deficit, 5/5 b/l extremities  EXTREMITIES:  2+ Peripheral Pulses, No clubbing, cyanosis, or edema  SKIN: No rashes or lesions    LABS:    04-20    141  |  103  |  9   ----------------------------<  86  3.5   |  23  |  0.76    Ca    10.1      20 Apr 2020 05:20  Phos  2.9     04-20  Mg     1.7     04-20    TPro  7.4  /  Alb  3.5  /  TBili  0.4  /  DBili  x   /  AST  17  /  ALT  14  /  AlkPhos  98  04-20      CAPILLARY BLOOD GLUCOSE      POCT Blood Glucose.: 91 mg/dL (20 Apr 2020 11:45)  POCT Blood Glucose.: 94 mg/dL (20 Apr 2020 08:29)  POCT Blood Glucose.: 101 mg/dL (19 Apr 2020 21:31)  POCT Blood Glucose.: 144 mg/dL (19 Apr 2020 17:05)            RADIOLOGY & ADDITIONAL TESTS:    Imaging Personally Reviewed:  [x] YES  [ ] NO    Consultant(s) Notes Reviewed:  [x] YES  [ ] NO      MEDICATIONS  (STANDING):  aspirin enteric coated 81 milliGRAM(s) Oral daily  dextrose 5%. 1000 milliLiter(s) (50 mL/Hr) IV Continuous <Continuous>  dextrose 50% Injectable 12.5 Gram(s) IV Push once  dextrose 50% Injectable 25 Gram(s) IV Push once  dextrose 50% Injectable 25 Gram(s) IV Push once  enoxaparin Injectable 40 milliGRAM(s) SubCutaneous daily  fludroCORTISONE 0.2 milliGRAM(s) Oral daily  insulin lispro (HumaLOG) corrective regimen sliding scale   SubCutaneous three times a day before meals  midodrine. 5 milliGRAM(s) Oral three times a day  multivitamin 1 Tablet(s) Oral daily  sertraline 50 milliGRAM(s) Oral daily  sodium chloride 0.9%. 1000 milliLiter(s) (100 mL/Hr) IV Continuous <Continuous>    MEDICATIONS  (PRN):  dextrose 40% Gel 15 Gram(s) Oral once PRN Blood Glucose LESS THAN 70 milliGRAM(s)/deciliter  glucagon  Injectable 1 milliGRAM(s) IntraMuscular once PRN Glucose LESS THAN 70 milligrams/deciliter  LORazepam   Injectable 2 milliGRAM(s) IV Push every 6 hours PRN Agitation      Care Discussed with Consultants/Other Providers [x] YES  [ ] NO    HEALTH ISSUES - PROBLEM Dx:
Patient is a 79y old  Female who presents with a chief complaint of ?syncope (10 Apr 2020 08:20)      SUBJECTIVE / OVERNIGHT EVENTS:  stable overall.  awake alert. comfortable.  denied pain.  no n/v/d.   tolerating diet.   still mildly orthostatic by per RN symptoms better  less dizzy  repeating orthostatic this evening.       Vital Signs Last 24 Hrs  T(C): 36.7 (19 Apr 2020 11:32), Max: 36.7 (19 Apr 2020 05:46)  T(F): 98.1 (19 Apr 2020 11:32), Max: 98.1 (19 Apr 2020 05:46)  HR: 60 (19 Apr 2020 11:32) (60 - 65)  BP: 157/64 (19 Apr 2020 11:32) (147/78 - 157/64)  BP(mean): --  RR: 17 (19 Apr 2020 11:32) (17 - 18)  SpO2: 99% (19 Apr 2020 11:32) (99% - 99%)  I&O's Summary    18 Apr 2020 07:01  -  19 Apr 2020 07:00  --------------------------------------------------------  IN: 240 mL / OUT: 0 mL / NET: 240 mL    19 Apr 2020 07:01  -  19 Apr 2020 14:49  --------------------------------------------------------  IN: 440 mL / OUT: 0 mL / NET: 440 mL      PHYSICAL EXAM:  GENERAL: NAD, Comfortable on room air  HEAD:  Atraumatic, Normocephalic  EYES: EOMI, PERRLA, conjunctiva and sclera clear  NECK: Supple, No JVD  CHEST/LUNG: Clear to auscultation bilaterally; No wheeze  HEART: Regular rate and rhythm; No murmurs, rubs, or gallops  ABDOMEN: Soft, Nontender, Nondistended; Bowel sounds present  Neuro: AAO x 3, very forgetful, +dementia, no focal deficit, 5/5 b/l extremities  EXTREMITIES:  2+ Peripheral Pulses, No clubbing, cyanosis, or edema  SKIN: No rashes or lesions      LABS:                        10.8   5.74  )-----------( 450      ( 18 Apr 2020 06:30 )             34.0     04-18    141  |  105  |  7   ----------------------------<  92  4.0   |  25  |  0.71    Ca    10.0      18 Apr 2020 06:30  Phos  2.2     04-18  Mg     1.6     04-18    TPro  7.1  /  Alb  3.4  /  TBili  0.4  /  DBili  x   /  AST  20  /  ALT  12  /  AlkPhos  94  04-18      CAPILLARY BLOOD GLUCOSE      POCT Blood Glucose.: 96 mg/dL (19 Apr 2020 11:35)  POCT Blood Glucose.: 94 mg/dL (19 Apr 2020 08:34)  POCT Blood Glucose.: 111 mg/dL (18 Apr 2020 21:34)  POCT Blood Glucose.: 110 mg/dL (18 Apr 2020 16:48)            RADIOLOGY & ADDITIONAL TESTS:    Imaging Personally Reviewed:  [x] YES  [ ] NO    Consultant(s) Notes Reviewed:  [x] YES  [ ] NO      MEDICATIONS  (STANDING):  aspirin enteric coated 81 milliGRAM(s) Oral daily  dextrose 5%. 1000 milliLiter(s) (50 mL/Hr) IV Continuous <Continuous>  dextrose 50% Injectable 12.5 Gram(s) IV Push once  dextrose 50% Injectable 25 Gram(s) IV Push once  dextrose 50% Injectable 25 Gram(s) IV Push once  enoxaparin Injectable 40 milliGRAM(s) SubCutaneous daily  fludroCORTISONE 0.2 milliGRAM(s) Oral daily  insulin lispro (HumaLOG) corrective regimen sliding scale   SubCutaneous three times a day before meals  midodrine. 5 milliGRAM(s) Oral three times a day  multivitamin 1 Tablet(s) Oral daily  sertraline 50 milliGRAM(s) Oral daily  sodium chloride 0.9%. 1000 milliLiter(s) (100 mL/Hr) IV Continuous <Continuous>    MEDICATIONS  (PRN):  dextrose 40% Gel 15 Gram(s) Oral once PRN Blood Glucose LESS THAN 70 milliGRAM(s)/deciliter  glucagon  Injectable 1 milliGRAM(s) IntraMuscular once PRN Glucose LESS THAN 70 milligrams/deciliter  LORazepam   Injectable 2 milliGRAM(s) IV Push every 6 hours PRN Agitation      Care Discussed with Consultants/Other Providers [x] YES  [ ] NO    HEALTH ISSUES - PROBLEM Dx:
Patient is a 79y old  Female who presents with a chief complaint of ?syncope (10 Apr 2020 08:20)      SUBJECTIVE / OVERNIGHT EVENTS:  still orthostatic  on midodrine  start florinef 0.2 qdaily  no cp, no sob, no n/v/d. no abdominal pain.  no headache, no dizziness.       Vital Signs Last 24 Hrs  T(C): 36.6 (18 Apr 2020 11:54), Max: 36.6 (17 Apr 2020 22:34)  T(F): 97.9 (18 Apr 2020 11:54), Max: 97.9 (17 Apr 2020 22:34)  HR: 55 (18 Apr 2020 11:54) (55 - 62)  BP: 148/55 (18 Apr 2020 11:54) (148/55 - 157/69)  BP(mean): --  RR: 18 (18 Apr 2020 11:54) (17 - 18)  SpO2: 98% (18 Apr 2020 11:54) (97% - 98%)  I&O's Summary    17 Apr 2020 07:01  -  18 Apr 2020 07:00  --------------------------------------------------------  IN: 0 mL / OUT: 800 mL / NET: -800 mL    18 Apr 2020 07:01  -  18 Apr 2020 14:04  --------------------------------------------------------  IN: 240 mL / OUT: 0 mL / NET: 240 mL        PHYSICAL EXAM:  GENERAL: NAD, Comfortable on room air  HEAD:  Atraumatic, Normocephalic  EYES: EOMI, PERRLA, conjunctiva and sclera clear  NECK: Supple, No JVD  CHEST/LUNG: Clear to auscultation bilaterally; No wheeze  HEART: Regular rate and rhythm; No murmurs, rubs, or gallops  ABDOMEN: Soft, Nontender, Nondistended; Bowel sounds present  Neuro: AAO x 3, very forgetful, +dementia, no focal deficit, 5/5 b/l extremities  EXTREMITIES:  2+ Peripheral Pulses, No clubbing, cyanosis, or edema  SKIN: No rashes or lesions      LABS:                        10.8   5.74  )-----------( 450      ( 18 Apr 2020 06:30 )             34.0     04-18    141  |  105  |  7   ----------------------------<  92  4.0   |  25  |  0.71    Ca    10.0      18 Apr 2020 06:30  Phos  2.2     04-18  Mg     1.6     04-18    TPro  7.1  /  Alb  3.4  /  TBili  0.4  /  DBili  x   /  AST  20  /  ALT  12  /  AlkPhos  94  04-18      CAPILLARY BLOOD GLUCOSE      POCT Blood Glucose.: 139 mg/dL (18 Apr 2020 11:53)  POCT Blood Glucose.: 130 mg/dL (18 Apr 2020 08:35)  POCT Blood Glucose.: 127 mg/dL (17 Apr 2020 21:40)  POCT Blood Glucose.: 134 mg/dL (17 Apr 2020 16:53)            RADIOLOGY & ADDITIONAL TESTS:    Imaging Personally Reviewed:  [x] YES  [ ] NO    Consultant(s) Notes Reviewed:  [x] YES  [ ] NO      MEDICATIONS  (STANDING):  aspirin enteric coated 81 milliGRAM(s) Oral daily  dextrose 5%. 1000 milliLiter(s) (50 mL/Hr) IV Continuous <Continuous>  dextrose 50% Injectable 12.5 Gram(s) IV Push once  dextrose 50% Injectable 25 Gram(s) IV Push once  dextrose 50% Injectable 25 Gram(s) IV Push once  enoxaparin Injectable 40 milliGRAM(s) SubCutaneous daily  fludroCORTISONE 0.2 milliGRAM(s) Oral daily  insulin lispro (HumaLOG) corrective regimen sliding scale   SubCutaneous three times a day before meals  midodrine. 5 milliGRAM(s) Oral three times a day  multivitamin 1 Tablet(s) Oral daily  sertraline 50 milliGRAM(s) Oral daily  sodium chloride 0.9%. 1000 milliLiter(s) (100 mL/Hr) IV Continuous <Continuous>    MEDICATIONS  (PRN):  dextrose 40% Gel 15 Gram(s) Oral once PRN Blood Glucose LESS THAN 70 milliGRAM(s)/deciliter  glucagon  Injectable 1 milliGRAM(s) IntraMuscular once PRN Glucose LESS THAN 70 milligrams/deciliter  LORazepam   Injectable 2 milliGRAM(s) IV Push every 6 hours PRN Agitation      Care Discussed with Consultants/Other Providers [x] YES  [ ] NO    HEALTH ISSUES - PROBLEM Dx:

## 2020-04-21 NOTE — DISCHARGE NOTE PROVIDER - NSDCCPCAREPLAN_GEN_ALL_CORE_FT
PRINCIPAL DISCHARGE DIAGNOSIS  Diagnosis: Falls  Assessment and Plan of Treatment:       SECONDARY DISCHARGE DIAGNOSES  Diagnosis: Viral respiratory illness  Assessment and Plan of Treatment: PRINCIPAL DISCHARGE DIAGNOSIS  Diagnosis: Falls  Assessment and Plan of Treatment: You presented with multiple episodes of fall vs syncope vs body twitching. Your head CT was negative. Your chest CT showed ground glass opacities and your COVID test was positive. You were seen by cardiology. Your cardiac enzymes were negative and your EKG was ok. There was no further workup. You were noted to have orthostatic hypotension. You were started on Midodrine and Florinef. Please continue to take this medication as an outpatient. We stopped your Amlodipine. You may continue your Lisinopril.

## 2020-04-21 NOTE — PROVIDER CONTACT NOTE (OTHER) - ASSESSMENT
[address] unable to tolerate standing orthostatics due to dizziness and weakness. Supine and sitting VS in chart.
[address] had 6 beats of Vtach. Vitals in flowsheet. Patient laying in bed.
Patient sleeping.
pt asymptomatic.
[address] /59. HR 60. Patient BP has been high all day.
Patient is alert and oriented, stable, able to follow commands, patient is complaining of dizziness and she feels very faint, patent assisted back to bed and legs elevated

## 2020-04-22 ENCOUNTER — TRANSCRIPTION ENCOUNTER (OUTPATIENT)
Age: 80
End: 2020-04-22

## 2020-04-22 VITALS
RESPIRATION RATE: 18 BRPM | HEART RATE: 78 BPM | DIASTOLIC BLOOD PRESSURE: 48 MMHG | TEMPERATURE: 98 F | OXYGEN SATURATION: 100 % | SYSTOLIC BLOOD PRESSURE: 137 MMHG

## 2020-04-22 LAB
ALBUMIN SERPL ELPH-MCNC: 3.7 G/DL — SIGNIFICANT CHANGE UP (ref 3.3–5)
ALP SERPL-CCNC: 103 U/L — SIGNIFICANT CHANGE UP (ref 40–120)
ALT FLD-CCNC: 12 U/L — SIGNIFICANT CHANGE UP (ref 4–33)
ANION GAP SERPL CALC-SCNC: 12 MMO/L — SIGNIFICANT CHANGE UP (ref 7–14)
AST SERPL-CCNC: 14 U/L — SIGNIFICANT CHANGE UP (ref 4–32)
BASOPHILS # BLD AUTO: 0.06 K/UL — SIGNIFICANT CHANGE UP (ref 0–0.2)
BASOPHILS NFR BLD AUTO: 0.7 % — SIGNIFICANT CHANGE UP (ref 0–2)
BILIRUB SERPL-MCNC: 0.5 MG/DL — SIGNIFICANT CHANGE UP (ref 0.2–1.2)
BUN SERPL-MCNC: 7 MG/DL — SIGNIFICANT CHANGE UP (ref 7–23)
CALCIUM SERPL-MCNC: 10.1 MG/DL — SIGNIFICANT CHANGE UP (ref 8.4–10.5)
CHLORIDE SERPL-SCNC: 104 MMOL/L — SIGNIFICANT CHANGE UP (ref 98–107)
CO2 SERPL-SCNC: 26 MMOL/L — SIGNIFICANT CHANGE UP (ref 22–31)
CREAT SERPL-MCNC: 0.69 MG/DL — SIGNIFICANT CHANGE UP (ref 0.5–1.3)
EOSINOPHIL # BLD AUTO: 0.16 K/UL — SIGNIFICANT CHANGE UP (ref 0–0.5)
EOSINOPHIL NFR BLD AUTO: 1.9 % — SIGNIFICANT CHANGE UP (ref 0–6)
GLUCOSE SERPL-MCNC: 98 MG/DL — SIGNIFICANT CHANGE UP (ref 70–99)
HCT VFR BLD CALC: 34.7 % — SIGNIFICANT CHANGE UP (ref 34.5–45)
HGB BLD-MCNC: 11.3 G/DL — LOW (ref 11.5–15.5)
IMM GRANULOCYTES NFR BLD AUTO: 0.4 % — SIGNIFICANT CHANGE UP (ref 0–1.5)
LYMPHOCYTES # BLD AUTO: 2.25 K/UL — SIGNIFICANT CHANGE UP (ref 1–3.3)
LYMPHOCYTES # BLD AUTO: 26.4 % — SIGNIFICANT CHANGE UP (ref 13–44)
MAGNESIUM SERPL-MCNC: 1.6 MG/DL — SIGNIFICANT CHANGE UP (ref 1.6–2.6)
MCHC RBC-ENTMCNC: 26.2 PG — LOW (ref 27–34)
MCHC RBC-ENTMCNC: 32.6 % — SIGNIFICANT CHANGE UP (ref 32–36)
MCV RBC AUTO: 80.3 FL — SIGNIFICANT CHANGE UP (ref 80–100)
MONOCYTES # BLD AUTO: 1.12 K/UL — HIGH (ref 0–0.9)
MONOCYTES NFR BLD AUTO: 13.1 % — SIGNIFICANT CHANGE UP (ref 2–14)
NEUTROPHILS # BLD AUTO: 4.9 K/UL — SIGNIFICANT CHANGE UP (ref 1.8–7.4)
NEUTROPHILS NFR BLD AUTO: 57.5 % — SIGNIFICANT CHANGE UP (ref 43–77)
NRBC # FLD: 0 K/UL — SIGNIFICANT CHANGE UP (ref 0–0)
PLATELET # BLD AUTO: 403 K/UL — HIGH (ref 150–400)
PMV BLD: 11.5 FL — SIGNIFICANT CHANGE UP (ref 7–13)
POTASSIUM SERPL-MCNC: 3.2 MMOL/L — LOW (ref 3.5–5.3)
POTASSIUM SERPL-SCNC: 3.2 MMOL/L — LOW (ref 3.5–5.3)
PROT SERPL-MCNC: 7.5 G/DL — SIGNIFICANT CHANGE UP (ref 6–8.3)
RBC # BLD: 4.32 M/UL — SIGNIFICANT CHANGE UP (ref 3.8–5.2)
RBC # FLD: 14.2 % — SIGNIFICANT CHANGE UP (ref 10.3–14.5)
SODIUM SERPL-SCNC: 142 MMOL/L — SIGNIFICANT CHANGE UP (ref 135–145)
WBC # BLD: 8.52 K/UL — SIGNIFICANT CHANGE UP (ref 3.8–10.5)
WBC # FLD AUTO: 8.52 K/UL — SIGNIFICANT CHANGE UP (ref 3.8–10.5)

## 2020-04-22 RX ORDER — FLUDROCORTISONE ACETATE 0.1 MG/1
2 TABLET ORAL
Qty: 60 | Refills: 0
Start: 2020-04-22 | End: 2020-05-21

## 2020-04-22 RX ORDER — POTASSIUM CHLORIDE 20 MEQ
10 PACKET (EA) ORAL
Refills: 0 | Status: DISCONTINUED | OUTPATIENT
Start: 2020-04-22 | End: 2020-04-22

## 2020-04-22 RX ORDER — AMLODIPINE BESYLATE 2.5 MG/1
1 TABLET ORAL
Qty: 0 | Refills: 0 | DISCHARGE

## 2020-04-22 RX ORDER — MIDODRINE HYDROCHLORIDE 2.5 MG/1
1 TABLET ORAL
Qty: 90 | Refills: 0
Start: 2020-04-22 | End: 2020-05-21

## 2020-04-22 RX ORDER — LISINOPRIL 2.5 MG/1
1 TABLET ORAL
Qty: 0 | Refills: 0 | DISCHARGE
Start: 2020-04-22

## 2020-04-22 RX ORDER — LISINOPRIL 2.5 MG/1
1 TABLET ORAL
Qty: 0 | Refills: 0 | DISCHARGE

## 2020-04-22 RX ADMIN — SERTRALINE 50 MILLIGRAM(S): 25 TABLET, FILM COATED ORAL at 11:33

## 2020-04-22 RX ADMIN — MIDODRINE HYDROCHLORIDE 5 MILLIGRAM(S): 2.5 TABLET ORAL at 06:03

## 2020-04-22 RX ADMIN — Medication 100 MILLIEQUIVALENT(S): at 11:33

## 2020-04-22 RX ADMIN — Medication 1 TABLET(S): at 11:33

## 2020-04-22 RX ADMIN — ENOXAPARIN SODIUM 40 MILLIGRAM(S): 100 INJECTION SUBCUTANEOUS at 11:33

## 2020-04-22 RX ADMIN — FLUDROCORTISONE ACETATE 0.2 MILLIGRAM(S): 0.1 TABLET ORAL at 06:03

## 2020-04-22 RX ADMIN — Medication 81 MILLIGRAM(S): at 11:33

## 2020-04-22 RX ADMIN — MIDODRINE HYDROCHLORIDE 5 MILLIGRAM(S): 2.5 TABLET ORAL at 11:33

## 2020-04-22 NOTE — DISCHARGE NOTE NURSING/CASE MANAGEMENT/SOCIAL WORK - PATIENT PORTAL LINK FT
You can access the FollowMyHealth Patient Portal offered by [facility] by registering at the following website: [email or web address]. By joining [employer]’s FollowMyHealth portal, you will also be able to view your health information using other applications (apps) compatible with our system.

## 2020-09-24 NOTE — ED ADULT TRIAGE NOTE - CHIEF COMPLAINT QUOTE
Pt c/o left lower back pain since last night.  Was walking today and left leg gave out and she fell onto left side.  C/O left arm and LLE pain.  No LOC.  Appears comfortable
detailed exam

## 2021-01-05 NOTE — PATIENT PROFILE ADULT. - CENTRAL VENOUS CATHETER
Health Maintenance Due   Topic Date Due   • Shingles Vaccine (1 of 2) 02/28/2012       Patient is due for topics as listed above but is not proceeding with Immunization(s) Shingles at this time.            no

## 2021-03-31 NOTE — EEG REPORT - NS EEG TEXT BOX
FUNMILAYO AGUILLON [id number] 79y ([id number])F  Admitting MD: Dr. Sydnie Leach    Study Date: 04-09-20    ROUTINE EEG    Technical Information:			  		  Placement and Labeling of Electrodes:  The EEG was performed utilizing 20 channels referential EEG connections (coronal over temporal over parasagittal montage) using all standard 10-20 electrode placements with EKG.  Recording was at a sampling rate of 256 samples per second per channel.  Time synchronized digital video recording was done simultaneously with EEG recording.  A low light infrared camera was used for low light recording.  Raul and seizure detection algorithms were utilized.    CSA Technical Component:  Quantitative EEG analysis using a separate Compressed Spectral Array (CSA) software package was conducted in real-time and run at bedside after set up by the technician, digitally displaying the power of electrographic frequencies included in the 1-30Hz band using a graded color map.  This data was reviewed and interpreted independently, and is reported in a separate section below.    --------------------------------------------------------------------------------------------------  History:  CC/ HPI Patient is a 79y old  Female who presents with a chief complaint of   aspirin enteric coated 81 milliGRAM(s) Oral daily  dextrose 5%. 1000 milliLiter(s) IV Continuous <Continuous>  dextrose 50% Injectable 12.5 Gram(s) IV Push once  dextrose 50% Injectable 25 Gram(s) IV Push once  dextrose 50% Injectable 25 Gram(s) IV Push once  enoxaparin Injectable 40 milliGRAM(s) SubCutaneous daily  insulin lispro (HumaLOG) corrective regimen sliding scale   SubCutaneous three times a day before meals  sertraline 50 milliGRAM(s) Oral daily  sodium chloride 0.9%. 1000 milliLiter(s) IV Continuous <Continuous>    --------------------------------------------------------------------------------------------------  Study Interpretation:    [[[Abbreviation Key:  PDR=alpha rhythm/posterior dominant rhythm. A-P=anterior posterior gradient.  Amplitude: ‘very low’:<20; ‘low’:20-50; ‘medium’:; ‘high’:>200uV.  Persistence for periodic/rhythmic patterns (% of epoch) ‘rare’:<1%; ‘occasional’:1-10%; ‘frequent’:10-50%; ‘abundant’:50-90%; ‘continuous’:>90%.  Persistence for sporadic discharges: ‘rare’:<1/hr; ‘occasional’:1/min-1/hr; ‘frequent’:>1/min; ‘abundant’:>1/10 sec.  GRDA=generalized rhythmic delta activity, LRDA=lateralized rhythmic delta activity, TIRDA=temporal intermittent rhythmic delta activity, FIRDA=frontal intermittent rhythmic activity. LPD=PLED=lateralized periodic discharges, GPD=generalized periodic discharges, BiPDs=BiPLEDs=bilateral independent periodic epileptiform discharges, SIRPID=stimulus induced rhythmic, periodic, or ictal appearing discharges.  Modifiers: +F=with fast component, +S=with spike component, +R=with rhythmic component.  S-B=burst suppression pattern.  Max=maximal. N1-drowsy, N2-stage II sleep, N3-slow wave sleep.  HV=hyperventilation, PS=photic stimulation]]]    FINDINGS:  The background was continuous, spontaneously variable and reactive.  During wakefulness, the posteriorly dominant rhythm consisted of symmetric, well modulated 8.5-9 Hz activity, with an amplitude to 30 uV, that attenuated to eye opening.  Low amplitude central beta was noted in wakefulness.    Background Slowing:  Generalized slowing: none was present.  Focal slowing: continuous bifrontal theta/polymorphic delta slowing.     Sleep Background:  Drowsiness was characterized by fragmentation, attenuation, and slowing of the background activity.    Stage II sleep transients were not recorded.    Epileptiform Activity:   No epileptiform discharges were present.    Events:  No clinical events were recorded.  No seizures were recorded.    Activation Procedures:   -Hyperventilation was not performed.    -Photic stimulation was not performed.      Artifacts:  Intermittent myogenic and movement artifacts were noted.    ECG:  The heart rate on single channel ECG at baseline was predominantly near BPM = 60-70  -----------------------------------------------------------------------------------------------------    EEG Classification / Summary:  Abnormal EEG study, awake / drowsy   - continuous bifrontal theta/polymorphic delta slowing.   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   There were no epileptiform abnormalities recorded.      -------------------------------------------------------------------------------------------------------  Rasta Sharpe DO  Epilepsy Fellow, [facility] [place] FUNMILAYO AGUILLON [id number] 79y ([id number])F  Admitting MD: Dr. Sydnie Leach    Study Date: 04-09-20    ROUTINE EEG    Technical Information:			  		  Placement and Labeling of Electrodes:  The EEG was performed utilizing 20 channels referential EEG connections (coronal over temporal over parasagittal montage) using all standard 10-20 electrode placements with EKG.  Recording was at a sampling rate of 256 samples per second per channel.  Time synchronized digital video recording was done simultaneously with EEG recording.  A low light infrared camera was used for low light recording.  Raul and seizure detection algorithms were utilized.    CSA Technical Component:  Quantitative EEG analysis using a separate Compressed Spectral Array (CSA) software package was conducted in real-time and run at bedside after set up by the technician, digitally displaying the power of electrographic frequencies included in the 1-30Hz band using a graded color map.  This data was reviewed and interpreted independently, and is reported in a separate section below.    --------------------------------------------------------------------------------------------------  History:  CC/ HPI Patient is a 79y old  Female who presents with a chief complaint of   aspirin enteric coated 81 milliGRAM(s) Oral daily  dextrose 5%. 1000 milliLiter(s) IV Continuous <Continuous>  dextrose 50% Injectable 12.5 Gram(s) IV Push once  dextrose 50% Injectable 25 Gram(s) IV Push once  dextrose 50% Injectable 25 Gram(s) IV Push once  enoxaparin Injectable 40 milliGRAM(s) SubCutaneous daily  insulin lispro (HumaLOG) corrective regimen sliding scale   SubCutaneous three times a day before meals  sertraline 50 milliGRAM(s) Oral daily  sodium chloride 0.9%. 1000 milliLiter(s) IV Continuous <Continuous>    --------------------------------------------------------------------------------------------------  Study Interpretation:    [[[Abbreviation Key:  PDR=alpha rhythm/posterior dominant rhythm. A-P=anterior posterior gradient.  Amplitude: ‘very low’:<20; ‘low’:20-50; ‘medium’:; ‘high’:>200uV.  Persistence for periodic/rhythmic patterns (% of epoch) ‘rare’:<1%; ‘occasional’:1-10%; ‘frequent’:10-50%; ‘abundant’:50-90%; ‘continuous’:>90%.  Persistence for sporadic discharges: ‘rare’:<1/hr; ‘occasional’:1/min-1/hr; ‘frequent’:>1/min; ‘abundant’:>1/10 sec.  GRDA=generalized rhythmic delta activity, LRDA=lateralized rhythmic delta activity, TIRDA=temporal intermittent rhythmic delta activity, FIRDA=frontal intermittent rhythmic activity. LPD=PLED=lateralized periodic discharges, GPD=generalized periodic discharges, BiPDs=BiPLEDs=bilateral independent periodic epileptiform discharges, SIRPID=stimulus induced rhythmic, periodic, or ictal appearing discharges.  Modifiers: +F=with fast component, +S=with spike component, +R=with rhythmic component.  S-B=burst suppression pattern.  Max=maximal. N1-drowsy, N2-stage II sleep, N3-slow wave sleep.  HV=hyperventilation, PS=photic stimulation]]]    FINDINGS:  The background was continuous, spontaneously variable and reactive.  During wakefulness, the posteriorly dominant rhythm consisted of symmetric, well modulated 8.5-9 Hz activity, with an amplitude to 30 uV, that attenuated to eye opening.  Low amplitude central beta was noted in wakefulness.    Background Slowing:  Generalized slowing:   -Intermittent generalized delta slowing    Focal slowing:   -Frontal intermittent rhythmic delta activity    Sleep Background:  Drowsiness was characterized by fragmentation, attenuation, and slowing of the background activity.    Stage II sleep transients were not recorded.    Epileptiform Activity:   No epileptiform discharges were present.    Events:  No clinical events were recorded.  No seizures were recorded.    Activation Procedures:   -Hyperventilation was not performed.    -Photic stimulation was not performed.      Artifacts:  Intermittent myogenic and movement artifacts were noted.    ECG:  The heart rate on single channel ECG at baseline was predominantly near BPM = 60-70  -----------------------------------------------------------------------------------------------------    EEG Classification / Summary:  Abnormal EEG study, awake / drowsy   -Frontal intermittent rhythmic delta activity (FIRDA)  -Intermittent generalized delta slowing    -----------------------------------------------------------------------------------------------------    Clinical Impression:  Mild nonspecific diffuse encephalopathy and subcortical dysfunction.  There were no epileptiform abnormalities recorded.      -------------------------------------------------------------------------------------------------------  Rasta Sharpe DO  Epilepsy Fellow, [facility]    Vaughn Lakhani MD  EEG / Epilepsy Attending Physician [[email or web address]] : prior neurologist - Faviola Marsh MD -- Address: [facility] [place] [address] -- Phone: [phone]\par \par *** 08/24/2020  ***\par ELIO is a 27 y F LH with h/o seizure since 16yo.  Initially treated with divalproex, and changed to levetiracetam 750 q12 at age 17.  ELIO has seizures when she forgets to take levetiracetam.  Last seizure was last friday at 5a (forgot to take meds).  Mother heard a cry, and found ELIO with tonic posture - 20 - 30 seconds, no clonic movements.  Prior to that seizure was in March 16 2020, also because forgot to take medication.  Seizures may occur anytime, day or night. 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  \par \par FH - no h/o seizure, no illnesses common to family.\par ROS - netgative x 14 systems. \par \par Dev Hx - no febrile seizures, full term, normal birth,  no CNS infections, dx with ADD.

## 2021-12-15 NOTE — PROGRESS NOTE ADULT - PROBLEM SELECTOR PLAN 4
Detail Level: Zone - continue home medications lisinopril and amlodipine Plan: Thick hyperkeratotic plaques removed - outpatient sleep studies indicate EDUAR  - patients fatigue likely due to noncompliance with CPAP  - will ensure CPAP used inpatient, patient used for 3-4 hours overnight

## 2021-12-17 NOTE — PROGRESS NOTE ADULT - PROBLEM/PLAN-3
Chief Complaint   Patient presents with   • Generalized Body Aches     low back pain / onset yesterday / had a rapid COVD test prior to coming in and a PCR   • Headache     nausea   • Diarrhea     one episode yesterday / NO episodes today        HISTORY OF PRESENT ILLNESS:   The patient is a 21 year old male who presents with 1 day fevers, generalized body aches, fatigue, nausea, headaches, and 1 bout of diarrhea yesterday.  Patient states that he  already had a COVID PCR done at a drive-through site.  Wanted to be checked out in case something else was going on outside of [facility].  Patient states he has not taken any over-the-counter medications for symptoms so far.  Patient denies any cough, wheezing or shortness of breath.    PAST MEDICAL HISTORY, PAST SURGICAL HISTORY, SOCIAL HISTORY, MEDICATIONS, AND ALLERGIES:  Reviewed per electronic chart.    REVIEW OF SYSTEMS:   Constitutional: Fever, body aches, fatigue and chills   Eyes:  Denies change in visual acuity   ENT: Denies runny nose, sinus congestion, sore throat or ear pain.  Respiratory:  Denies cough, wheezing or shortness of breath   Cardiovascular:  Denies chest pain or edema   Gastrointestinal:  Nausea, vomiting, diarrhea.  Denies abdominal pain or bloody stools.  Neurologic: Headaches. Denies  focal weakness or sensory changes     PHYSICAL EXAM:  Vitals:   Vitals:    12/17/21 1151   BP: 95/63   Pulse: (!) 121   Temp: (!) 100.6 °F (38.1 °C)   TempSrc: Tympanic   SpO2: 98%   Weight: 64 kg (141 lb)   Height: 5' 10\" (1.778 m)      Constitutional:  Acutely ill-appearing.  No acute distress, nontoxic appearance.  HENT: Normocephalic, atraumatic. Bilateral external ears normal. Oropharynx moist. No oral exudates. Nasal mucosa appear normal without discharge.  Bilateral tympanic membranes appear normal, no erythema or exudates.  Eyes:  PERRLA (pupils equal, round, and reactive to light and accommodation), EOMI (extraocular movements are intact). Conjunctivae 
normal. No discharge.  Neck:  Normal range of motion. No tenderness. Supple. No lymphadenopathy. No stridor.  Cardiovascular:  Normal heart rate. Normal rhythm. No murmurs. No rubs. No gallops.  Pulmonary/Chest:  Clear to auscultation throughout. No respiratory distress. No wheezing.  Abdomen:  Bowel sounds normal. Soft. No tenderness. No masses. No pulsatile masses.  Neuro: Alert and orientated x3. Speech is clear.    Labs/Radiology:  Orders Placed This Encounter   • SERVICE TO FAMILY PRACTICE   • POCT Flu, Influenza, Rapid A/B       ASSESSMENT:   1. Gastroenteritis    2. Body aches        PLAN:  Rapid influenza test was negative.  Patient is waiting on a COVID PCR test from a drive-through site, states should be back by tomorrow.  Discussed home isolation precautions, anticipatory guidance, home supportive treatment options.  Encouraged adequate hydration and relative rest.  Encouraged good hand hygiene practices.  Educational handout provided to patient for review.    Patient does not currently have a primary care provider, referral to Family Practice ordered, he will be contacted with scheduling.  Patient acknowledged understanding of the instructions and is agreeable to plan.    Collaborating physician is Dr. Francisco Fan.  
DISPLAY PLAN FREE TEXT

## 2022-04-22 ENCOUNTER — EMERGENCY (EMERGENCY)
Facility: HOSPITAL | Age: 82
LOS: 1 days | Discharge: HOME | End: 2022-04-22
Attending: EMERGENCY MEDICINE | Admitting: EMERGENCY MEDICINE
Payer: MEDICARE

## 2022-04-22 VITALS
DIASTOLIC BLOOD PRESSURE: 66 MMHG | TEMPERATURE: 97 F | OXYGEN SATURATION: 99 % | HEIGHT: 64 IN | HEART RATE: 89 BPM | RESPIRATION RATE: 18 BRPM | SYSTOLIC BLOOD PRESSURE: 151 MMHG

## 2022-04-22 DIAGNOSIS — Z90.710 ACQUIRED ABSENCE OF BOTH CERVIX AND UTERUS: Chronic | ICD-10-CM

## 2022-04-22 DIAGNOSIS — Z90.49 ACQUIRED ABSENCE OF OTHER SPECIFIED PARTS OF DIGESTIVE TRACT: Chronic | ICD-10-CM

## 2022-04-22 LAB
ALBUMIN SERPL ELPH-MCNC: 3.7 G/DL — SIGNIFICANT CHANGE UP (ref 3.3–5)
ALP SERPL-CCNC: 121 U/L — HIGH (ref 40–120)
ALT FLD-CCNC: 9 U/L — SIGNIFICANT CHANGE UP (ref 4–33)
ANION GAP SERPL CALC-SCNC: 14 MMOL/L — SIGNIFICANT CHANGE UP (ref 7–14)
AST SERPL-CCNC: 9 U/L — SIGNIFICANT CHANGE UP (ref 4–32)
BASOPHILS # BLD AUTO: 0.06 K/UL — SIGNIFICANT CHANGE UP (ref 0–0.2)
BASOPHILS NFR BLD AUTO: 0.6 % — SIGNIFICANT CHANGE UP (ref 0–2)
BILIRUB SERPL-MCNC: 0.3 MG/DL — SIGNIFICANT CHANGE UP (ref 0.2–1.2)
BUN SERPL-MCNC: 12 MG/DL — SIGNIFICANT CHANGE UP (ref 7–23)
CALCIUM SERPL-MCNC: 9.6 MG/DL — SIGNIFICANT CHANGE UP (ref 8.4–10.5)
CHLORIDE SERPL-SCNC: 100 MMOL/L — SIGNIFICANT CHANGE UP (ref 98–107)
CO2 SERPL-SCNC: 27 MMOL/L — SIGNIFICANT CHANGE UP (ref 22–31)
CREAT SERPL-MCNC: 0.73 MG/DL — SIGNIFICANT CHANGE UP (ref 0.5–1.3)
CRP SERPL-MCNC: <4 MG/L — SIGNIFICANT CHANGE UP
EGFR: 84 ML/MIN/1.73M2 — SIGNIFICANT CHANGE UP
EOSINOPHIL # BLD AUTO: 0.23 K/UL — SIGNIFICANT CHANGE UP (ref 0–0.5)
EOSINOPHIL NFR BLD AUTO: 2.3 % — SIGNIFICANT CHANGE UP (ref 0–6)
GLUCOSE SERPL-MCNC: 131 MG/DL — HIGH (ref 70–99)
HCT VFR BLD CALC: 35.3 % — SIGNIFICANT CHANGE UP (ref 34.5–45)
HGB BLD-MCNC: 11 G/DL — LOW (ref 11.5–15.5)
IANC: 5.06 K/UL — SIGNIFICANT CHANGE UP (ref 1.8–7.4)
IMM GRANULOCYTES NFR BLD AUTO: 0.3 % — SIGNIFICANT CHANGE UP (ref 0–1.5)
LYMPHOCYTES # BLD AUTO: 3.45 K/UL — HIGH (ref 1–3.3)
LYMPHOCYTES # BLD AUTO: 35.1 % — SIGNIFICANT CHANGE UP (ref 13–44)
MCHC RBC-ENTMCNC: 25.7 PG — LOW (ref 27–34)
MCHC RBC-ENTMCNC: 31.2 GM/DL — LOW (ref 32–36)
MCV RBC AUTO: 82.5 FL — SIGNIFICANT CHANGE UP (ref 80–100)
MONOCYTES # BLD AUTO: 1 K/UL — HIGH (ref 0–0.9)
MONOCYTES NFR BLD AUTO: 10.2 % — SIGNIFICANT CHANGE UP (ref 2–14)
NEUTROPHILS # BLD AUTO: 5.06 K/UL — SIGNIFICANT CHANGE UP (ref 1.8–7.4)
NEUTROPHILS NFR BLD AUTO: 51.5 % — SIGNIFICANT CHANGE UP (ref 43–77)
NRBC # BLD: 0 /100 WBCS — SIGNIFICANT CHANGE UP
NRBC # FLD: 0 K/UL — SIGNIFICANT CHANGE UP
PLATELET # BLD AUTO: 383 K/UL — SIGNIFICANT CHANGE UP (ref 150–400)
POTASSIUM SERPL-MCNC: 3 MMOL/L — LOW (ref 3.5–5.3)
POTASSIUM SERPL-SCNC: 3 MMOL/L — LOW (ref 3.5–5.3)
PROT SERPL-MCNC: 7.3 G/DL — SIGNIFICANT CHANGE UP (ref 6–8.3)
RBC # BLD: 4.28 M/UL — SIGNIFICANT CHANGE UP (ref 3.8–5.2)
RBC # FLD: 14.6 % — HIGH (ref 10.3–14.5)
SODIUM SERPL-SCNC: 141 MMOL/L — SIGNIFICANT CHANGE UP (ref 135–145)
WBC # BLD: 9.83 K/UL — SIGNIFICANT CHANGE UP (ref 3.8–10.5)
WBC # FLD AUTO: 9.83 K/UL — SIGNIFICANT CHANGE UP (ref 3.8–10.5)

## 2022-04-22 PROCEDURE — 99284 EMERGENCY DEPT VISIT MOD MDM: CPT | Mod: GC

## 2022-04-22 RX ORDER — AMPICILLIN SODIUM AND SULBACTAM SODIUM 250; 125 MG/ML; MG/ML
3 INJECTION, POWDER, FOR SUSPENSION INTRAMUSCULAR; INTRAVENOUS ONCE
Refills: 0 | Status: COMPLETED | OUTPATIENT
Start: 2022-04-22 | End: 2022-04-22

## 2022-04-22 RX ORDER — KETOROLAC TROMETHAMINE 30 MG/ML
15 SYRINGE (ML) INJECTION ONCE
Refills: 0 | Status: DISCONTINUED | OUTPATIENT
Start: 2022-04-22 | End: 2022-04-22

## 2022-04-22 RX ORDER — MORPHINE SULFATE 50 MG/1
2 CAPSULE, EXTENDED RELEASE ORAL ONCE
Refills: 0 | Status: DISCONTINUED | OUTPATIENT
Start: 2022-04-22 | End: 2022-04-22

## 2022-04-22 RX ORDER — ACETAMINOPHEN 500 MG
975 TABLET ORAL ONCE
Refills: 0 | Status: COMPLETED | OUTPATIENT
Start: 2022-04-22 | End: 2022-04-22

## 2022-04-22 RX ADMIN — Medication 975 MILLIGRAM(S): at 22:51

## 2022-04-22 RX ADMIN — AMPICILLIN SODIUM AND SULBACTAM SODIUM 200 GRAM(S): 250; 125 INJECTION, POWDER, FOR SUSPENSION INTRAMUSCULAR; INTRAVENOUS at 22:29

## 2022-04-22 RX ADMIN — Medication 15 MILLIGRAM(S): at 22:51

## 2022-04-22 RX ADMIN — MORPHINE SULFATE 2 MILLIGRAM(S): 50 CAPSULE, EXTENDED RELEASE ORAL at 22:51

## 2022-04-22 NOTE — ED PROVIDER NOTE - PHYSICAL EXAMINATION
Well appearing, well nourished, awake, alert, and in no apparent distress.    Airway patent. L ear: EAC unremarkable., ear wax. R ear: pre-auricular tenderness. EAC obscured by tender, pink mass.    Eyes without scleral injection. No jaundice.    Strong pulse.    Respirations unlabored.    Abdomen soft, non-tender, no guarding.    Spine appears normal, range of motion is not limited, no muscle or joint tenderness.    Alert and oriented, no gross motor or sensory deficits.    Skin normal color for race, warm, dry and intact. No evidence of rash.    No SI/HI.

## 2022-04-22 NOTE — ED ADULT NURSE NOTE - NSIMPLEMENTINTERV_GEN_ALL_ED
Implemented All Fall Risk Interventions:  [place] to call system. Call bell, personal items and telephone within reach. Instruct patient to call for assistance. Room bathroom lighting operational. Non-slip footwear when patient is off stretcher. Physically safe environment: no spills, clutter or unnecessary equipment. Stretcher in lowest position, wheels locked, appropriate side rails in place. Provide visual cue, wrist band, yellow gown, etc. Monitor gait and stability. Monitor for mental status changes and reorient to person, place, and time. Review medications for side effects contributing to fall risk. Reinforce activity limits and safety measures with patient and family.

## 2022-04-22 NOTE — ED ADULT TRIAGE NOTE - CHIEF COMPLAINT QUOTE
As per daughter Maribell. " This started 3 weeks ago she is having rt ear pain....went to Urgent Care they gave her antibiotic did not get better. Saw ENT on Tuesday given ear gtts...told she has a pus pocket so bad could not see ear drum...between last night she is crying pain is so bad. Called MD told to go to ER." Pt appears very uncomfortable. DM2, hip frx rt, uses walker. HTN, Syncopy, Dementia

## 2022-04-22 NOTE — ED PROVIDER NOTE - OBJECTIVE STATEMENT
Samira: H/o orthostatic hypotension. Dementia. 3 wks ago ear pain. Urgent Care gave Amox. Not improve. Saw ENT NP Nilam [phone] today: pus pocket obscuring TM and got Oflox ear drops and instructions for vinegar/water in ear. Still in pain. No F. No new neuro deficits.    Meds:   Norasc 5  Janumet 50/1000 BID  Midodrine 5  Zofran 4  Zoloft 100  Fludrocortisone 0.1mg bid   Vitamin D2 50,000 units  Lisinopril  ASA 81  Lipitor 10

## 2022-04-22 NOTE — ED PROVIDER NOTE - NSFOLLOWUPINSTRUCTIONS_ED_ALL_ED_FT
Use ciprodex 4 drops twice daily for 7-10 days.    Keep right ear dry. Keep ear wick in place. If falls out call ENT for appointment.    Follow up with ENT in 1 week. Call to make appointment.     Call [phone] for appointment.     Return for new or worsening symptoms.

## 2022-04-22 NOTE — ED PROVIDER NOTE - ATTENDING CONTRIBUTION TO CARE
I performed a face-to-face evaluation of the patient and performed a history and physical examination. I agree with the history and physical examination. If this was a PA visit, I personally saw the patient with the PA and performed a substantive portion of the visit including all aspects of the medical decision making.    Samira: Consider R EAC abscess. CT temporal bone to look for abscess. IV ABx. Call ENT. Pain control.

## 2022-04-22 NOTE — ED PROVIDER NOTE - PROGRESS NOTE DETAILS
CT with otitis externa and also concern for sublingual mass. Patient evaluated by ENT, ear wick placed, recommend ciprodex drops. To follow up in the ENT clinic. Bharat Kraft, DO PGY3

## 2022-04-22 NOTE — ED PROVIDER NOTE - CLINICAL SUMMARY MEDICAL DECISION MAKING FREE TEXT BOX
Samira: Consider R EAC abscess. CT temporal bone to look for abscess. IV ABx. Call ENT. Pain control.

## 2022-04-22 NOTE — ED ADULT NURSE NOTE - OBJECTIVE STATEMENT
Kareen RN note- patient arrives to the ED for right ear pain x3 weeks. A&Ox2 (oriented to self and place). Patient was seen by ENT 3 days ago and given ear drops for a "pus pocket" in her ear. Per patient's daughter patient's ear pain has been worsening and she was told to come to the ER. No abnormality or drainage noted to the right ear. 20g IV placed to right arm. Labs sent as ordered. COVID swab sent. Patient breathing even and nonlabored. No acute distress. Patient medicated as ordered. Safety maintained. Patient stable upon exiting the room. Daughter at bedside.

## 2022-04-22 NOTE — ED PROVIDER NOTE - PATIENT PORTAL LINK FT
You can access the FollowMyHealth Patient Portal offered by [facility] by registering at the following website: [email or web address]. By joining [employer]’s FollowMyHealth portal, you will also be able to view your health information using other applications (apps) compatible with our system.

## 2022-04-22 NOTE — ED PROVIDER NOTE - NSFOLLOWUPCLINICS_GEN_ALL_ED_FT
[facility] ENT  ENT  [address], [address]  [place] [address]  Phone: [phone]  Fax:

## 2022-04-23 VITALS
OXYGEN SATURATION: 100 % | TEMPERATURE: 98 F | SYSTOLIC BLOOD PRESSURE: 171 MMHG | RESPIRATION RATE: 16 BRPM | DIASTOLIC BLOOD PRESSURE: 82 MMHG | HEART RATE: 90 BPM

## 2022-04-23 LAB
ERYTHROCYTE [SEDIMENTATION RATE] IN BLOOD: 21 MM/HR — SIGNIFICANT CHANGE UP (ref 4–25)
SARS-COV-2 RNA SPEC QL NAA+PROBE: SIGNIFICANT CHANGE UP

## 2022-04-23 PROCEDURE — 70487 CT MAXILLOFACIAL W/DYE: CPT | Mod: 26,MA

## 2022-04-23 RX ORDER — CIPROFLOXACIN HCL 0.3 %
4 DROPS OPHTHALMIC (EYE) ONCE
Refills: 0 | Status: DISCONTINUED | OUTPATIENT
Start: 2022-04-23 | End: 2022-04-23

## 2022-04-23 RX ORDER — CIPROFLOXACIN AND DEXAMETHASONE 3; 1 MG/ML; MG/ML
4 SUSPENSION/ DROPS AURICULAR (OTIC) ONCE
Refills: 0 | Status: DISCONTINUED | OUTPATIENT
Start: 2022-04-23 | End: 2022-04-23

## 2022-04-23 RX ORDER — POTASSIUM CHLORIDE 20 MEQ
40 PACKET (EA) ORAL ONCE
Refills: 0 | Status: COMPLETED | OUTPATIENT
Start: 2022-04-23 | End: 2022-04-23

## 2022-04-23 RX ORDER — CIPROFLOXACIN AND DEXAMETHASONE 3; 1 MG/ML; MG/ML
4 SUSPENSION/ DROPS AURICULAR (OTIC) ONCE
Refills: 0 | Status: COMPLETED | OUTPATIENT
Start: 2022-04-23 | End: 2022-04-23

## 2022-04-23 RX ADMIN — MORPHINE SULFATE 2 MILLIGRAM(S): 50 CAPSULE, EXTENDED RELEASE ORAL at 00:28

## 2022-04-23 RX ADMIN — Medication 15 MILLIGRAM(S): at 00:28

## 2022-04-23 RX ADMIN — Medication 975 MILLIGRAM(S): at 00:28

## 2022-04-23 RX ADMIN — Medication 40 MILLIEQUIVALENT(S): at 02:34

## 2022-04-23 RX ADMIN — CIPROFLOXACIN AND DEXAMETHASONE 4 DROP(S): 3; 1 SUSPENSION/ DROPS AURICULAR (OTIC) at 05:40

## 2022-04-23 NOTE — ED ADULT NURSE REASSESSMENT NOTE - NS ED NURSE REASSESS COMMENT FT1
Break RN note- patient resting quietly in bed, breathing even and nonlabored. No acute distress. Patient reports an improvement to her ear pain. Awaiting CT results. Safety maintained. Patient stable upon exiting the room.

## 2022-04-23 NOTE — CONSULT NOTE ADULT - SUBJECTIVE AND OBJECTIVE BOX
HPI:  Patient is a 81y Female with PMH significant for dm htn hld, now p/w 10d R ear pain. sp augmentin, then cipro gtt with no resolution. no headaches or fevers. no previous ear infections. remote smoking and drinking hx. tolerating po reg diet, no sob or resp distress, no change in voice.       Physical Exam  T(C): 36.6 (04-23-22 @ 02:51), Max: 36.6 (04-23-22 @ 02:51)  HR: 90 (04-23-22 @ 02:51) (81 - 90)  BP: 171/82 (04-23-22 @ 02:51) (151/66 - 171/82)  RR: 16 (04-23-22 @ 02:51) (16 - 18)  SpO2: 100% (04-23-22 @ 02:51) (99% - 100%)    General: NAD, A+Ox3  No respiratory distress, stridor, or stertor  Voice quality: normal  Face:  Symmetric without masses or lesions  OU: PERRL, EOMI  AD: pinna wnl, no tragal tenderness. EAC swollen. discharge within lumen of EAC.   AS: Pinna wnl, EAC clear, TM intact, no effusion  OC/OP: tongue normal, mucosa wnl. difficult to see OP  Neck: soft/flat, no LAD  CNII-XII intact    Procedure: Flexible laryngoscopy  NP/NC wnl  pt has R BOT fullness which extends to the vallecula  some fullness of b/l arytenoids is present but they are not edematous. bl TVF viewed, mobile. subglottis patent.   pyriform appears to be more full R>L  epiglottis wnl    < from: CT Maxillofacial w/ IV Cont (04.23.22 @ 01:05) >  Enhancing right ear inflammation with circumferential soft tissue   thickening involving the right external auditory canal and a slightly   thickened tympanic membrane compatible with otitis externa. No drainable   collection or osseous erosion.    Slight right mastoid effusion, likely reactive.    Enhancing soft tissue focus along the posterior aspect of the base of the   tongue as described above concerning for neoplasm. Recommend direct   visualization.    < end of copied text >      A/P: 82yo F with R OE, s/p wick insertion on 4/23/22. patient with asymmetrical R BOT fullness as well, differential includes lingual tonsil hypertrophy vs mass  - OE: ciprodex 4gtt BID for 7-10 days, until f/up with ENT otology provider  - R ear dry ear precautions  - Wick inserted to R ear . Keep wick in place. If wick falls out, to make outpatient clinic appointment for reinsertion. fup within 1 week.   - Cultures sent. We will follow up with culture results.   - Patient also needs head and neck surgery appointment to fup possible BOT mass, please call clinic number below. we will also add patient on to our follow-up schedule  - F/up with ENT outpatient for ear debridement and f/up of infection. Follow up with the ENT (Ear, Nose, Throat) department after discharge. Call [phone] for appointment.     --------------------------------------------------------------  Thank you for the consult,    Manny Neely MD  Resident  Department of Otolaryngology - Head and Neck Surgery  Spectra #[phone]  Peds Page #[phone]  Adult Page #[phone]  ---------------------------------------------------------------

## 2022-04-26 LAB
-  AMIKACIN: SIGNIFICANT CHANGE UP
-  AMOXICILLIN/CLAVULANIC ACID: SIGNIFICANT CHANGE UP
-  AMPICILLIN/SULBACTAM: SIGNIFICANT CHANGE UP
-  AMPICILLIN: SIGNIFICANT CHANGE UP
-  AZTREONAM: SIGNIFICANT CHANGE UP
-  CEFAZOLIN: SIGNIFICANT CHANGE UP
-  CEFEPIME: SIGNIFICANT CHANGE UP
-  CEFOXITIN: SIGNIFICANT CHANGE UP
-  CEFTRIAXONE: SIGNIFICANT CHANGE UP
-  CIPROFLOXACIN: SIGNIFICANT CHANGE UP
-  ERTAPENEM: SIGNIFICANT CHANGE UP
-  GENTAMICIN: SIGNIFICANT CHANGE UP
-  LEVOFLOXACIN: SIGNIFICANT CHANGE UP
-  MEROPENEM: SIGNIFICANT CHANGE UP
-  PIPERACILLIN/TAZOBACTAM: SIGNIFICANT CHANGE UP
-  TOBRAMYCIN: SIGNIFICANT CHANGE UP
-  TRIMETHOPRIM/SULFAMETHOXAZOLE: SIGNIFICANT CHANGE UP
METHOD TYPE: SIGNIFICANT CHANGE UP

## 2022-04-27 NOTE — ED POST DISCHARGE NOTE - RESULT SUMMARY
Ear CX: proteus mirabilis. No antibiotic listed in ED provider note or prescription writer at time of discharge. S/W patient's daughter patient presently in [facility]  due to uncontrollable pain. According to daughter patient on Abx given by  after seen in our ED CULLEN marin fell out of ear. Abx  name  Amoxicillin which is sensitive.

## 2022-04-28 LAB
CULTURE RESULTS: SIGNIFICANT CHANGE UP
ORGANISM # SPEC MICROSCOPIC CNT: SIGNIFICANT CHANGE UP
ORGANISM # SPEC MICROSCOPIC CNT: SIGNIFICANT CHANGE UP
SPECIMEN SOURCE: SIGNIFICANT CHANGE UP

## 2022-05-03 ENCOUNTER — APPOINTMENT (OUTPATIENT)
Dept: OTOLARYNGOLOGY | Facility: CLINIC | Age: 82
End: 2022-05-03
Payer: MEDICARE

## 2022-05-03 VITALS — BODY MASS INDEX: 21.51 KG/M2 | HEIGHT: 64 IN | WEIGHT: 126 LBS

## 2022-05-03 PROCEDURE — 99204 OFFICE O/P NEW MOD 45 MIN: CPT | Mod: 25

## 2022-05-03 PROCEDURE — 31575 DIAGNOSTIC LARYNGOSCOPY: CPT

## 2022-05-03 RX ORDER — SERTRALINE 25 MG/1
TABLET, FILM COATED ORAL
Refills: 0 | Status: ACTIVE | COMMUNITY

## 2022-05-03 RX ORDER — ATORVASTATIN CALCIUM 80 MG/1
TABLET, FILM COATED ORAL
Refills: 0 | Status: ACTIVE | COMMUNITY

## 2022-05-03 RX ORDER — CALCIUM CARBONATE/VITAMIN D3 600 MG-10
600-400 TABLET ORAL
Refills: 0 | Status: ACTIVE | COMMUNITY

## 2022-05-03 RX ORDER — AMLODIPINE BESYLATE 5 MG/1
TABLET ORAL
Refills: 0 | Status: ACTIVE | COMMUNITY

## 2022-05-03 RX ORDER — CLINDAMYCIN HYDROCHLORIDE 300 MG/1
CAPSULE ORAL
Refills: 0 | Status: ACTIVE | COMMUNITY

## 2022-05-03 RX ORDER — LORATADINE 10 MG/1
TABLET ORAL
Refills: 0 | Status: ACTIVE | COMMUNITY

## 2022-05-03 RX ORDER — QUETIAPINE FUMARATE 400 MG/1
TABLET ORAL
Refills: 0 | Status: ACTIVE | COMMUNITY

## 2022-05-03 RX ORDER — FLUDROCORTISONE ACETATE 0.1 MG/1
0.1 TABLET ORAL
Refills: 0 | Status: ACTIVE | COMMUNITY

## 2022-05-03 RX ORDER — LISINOPRIL 30 MG/1
TABLET ORAL
Refills: 0 | Status: ACTIVE | COMMUNITY

## 2022-05-03 RX ORDER — SITAGLIPTIN AND METFORMIN HYDROCHLORIDE 50; 1000 MG/1; MG/1
TABLET, FILM COATED ORAL
Refills: 0 | Status: ACTIVE | COMMUNITY

## 2022-05-03 NOTE — ASSESSMENT
[FreeTextEntry1] : Pt to schedule DL with biopsy.  I am unable to appreciate a BOT lesion today, but the CT findings are concerning enough to assess in OR under general anesthesia.\par \par \par Will debride R EAC and possibly biopsy R EAC at the same time.

## 2022-05-03 NOTE — PROCEDURE
[Unable to Cooperate with Mirror] : patient unable to cooperate with mirror [Lesion] : lesion identified by mirror examination needing further evaluation [Topical Lidocaine] : topical lidocaine [Oxymetazoline HCl] : oxymetazoline HCl [Flexible Endoscope] : examined with the flexible endoscope [True Vocal Cords Paralysis] : no true vocal cord paralysis [True Vocal Cords Erythematous] : no true vocal cord edema [True Vocal Cords Vo's Nodules] : no true vocal cord nodules [Glottis Arytenoid Cartilages] : no arytenoid granulomas [Glottis Arytenoid Cartilages Erythema] : no arytenoid erythema [Normal] : posterior cricoid area had healthy pink mucosa in the interarytenoid area and the esophageal inlet

## 2022-05-03 NOTE — HISTORY OF PRESENT ILLNESS
[de-identified] : 81 year old female with Alzheimer's referred by Cheryl Heath NP for base of tongue growth noticed 1 month ago. Reports dysphagia, odynophagia with solids and liquids. Reports right otalgia 03/01/2022.  CT mastoids 04/27/22 at [facility]. Reports white otorrhea. Reports weight loss of 30 lbs since 03/22.\par \par Pt also with persistent R otalgia.  Has been treated for OE with Augmentin and COS, and is now on Clindamycin and Ciprodex.

## 2022-05-03 NOTE — REASON FOR VISIT
[Initial Consultation] : an initial consultation for [FreeTextEntry2] : referred by Cheryl Heath NP for base of tongue growth.

## 2022-05-03 NOTE — CONSULT LETTER
[Consult Letter:] : I had the pleasure of evaluating your patient, [unfilled]. [Please see my note below.] : Please see my note below. [Consult Closing:] : Thank you very much for allowing me to participate in the care of this patient.  If you have any questions, please do not hesitate to contact me. [Sincerely,] : Sincerely, [Dear  ___] : Dear ~MARIBELL, [FreeTextEntry2] : Cheryl Heath NP\par [facility] [FreeTextEntry3] : Jluis Welsh MD, FACS\par Chief of Otolaryngology at [facility]\par \par Dept. of Otolaryngology\par [facility]\par

## 2022-05-03 NOTE — PHYSICAL EXAM
[Midline] : trachea located in midline position [Laryngoscopy Performed] : laryngoscopy was performed, see procedure section for findings [Normal] : no rashes [FreeTextEntry1] : Agitated, but ultimately cooperative [de-identified] : R EAC swollen and erythematous.  Unable to clean ear due to pt movement.  [de-identified] : No BOT mass on palpation

## 2022-05-06 RX ORDER — OXYCODONE AND ACETAMINOPHEN 5; 325 MG/1; MG/1
5-325 TABLET ORAL
Qty: 12 | Refills: 0 | Status: ACTIVE | COMMUNITY
Start: 2022-05-06 | End: 1900-01-01

## 2022-05-18 NOTE — BEHAVIORAL HEALTH ASSESSMENT NOTE - NSBHCONSULTMEDPRNREASON_PSY_A_CORE
A/P: 89 male with Acute Hypoxic Respiratory failure frol Large B/L pleural effusions, septic shock from peritonitis, Recurrent YAMINI, and altered awareness    Plan:  CCU    PULM: Effusions improved after B/L pig tails placed.  Wean down Fi O2     Cardio: Hypotensive from septic shock, continue brit.  NO MORE VOL resuscitation as he is total body vol overloaded.  Wean brit if BP tolerates    Renal: In YAMINI likely from ATN.     GI: With fecal peritonitis, fecal material coming through the abdominal incision, continue oksana.  Stop PN because of rising K+ and YAMINI    ID: Septic shock from peritonitis.       GOC: DNR/DNI.  Having a meeting with the patients wife this morning to transition to just comfort care severe agitation.../agitation...

## 2022-05-22 NOTE — PHYSICAL THERAPY INITIAL EVALUATION ADULT - AMBULATION SKILLS, REHAB EVAL
Pt. With bradycardia with subsequent asystole.  Daughter Racquel at bedside , well aware of current situation, support given.  All questions answered.   independent

## 2022-05-25 ENCOUNTER — APPOINTMENT (OUTPATIENT)
Dept: OTOLARYNGOLOGY | Facility: HOSPITAL | Age: 82
End: 2022-05-25

## 2022-05-31 ENCOUNTER — APPOINTMENT (OUTPATIENT)
Dept: OTOLARYNGOLOGY | Facility: CLINIC | Age: 82
End: 2022-05-31

## 2022-06-21 ENCOUNTER — EMERGENCY (EMERGENCY)
Facility: HOSPITAL | Age: 82
LOS: 0 days | Discharge: HOME | End: 2022-06-22
Attending: STUDENT IN AN ORGANIZED HEALTH CARE EDUCATION/TRAINING PROGRAM
Payer: MEDICARE

## 2022-06-21 VITALS
DIASTOLIC BLOOD PRESSURE: 83 MMHG | TEMPERATURE: 97 F | OXYGEN SATURATION: 99 % | SYSTOLIC BLOOD PRESSURE: 130 MMHG | RESPIRATION RATE: 18 BRPM | HEART RATE: 67 BPM | HEIGHT: 65 IN | WEIGHT: 125 LBS

## 2022-06-21 DIAGNOSIS — Z88.8 ALLERGY STATUS TO OTHER DRUGS, MEDICAMENTS AND BIOLOGICAL SUBSTANCES STATUS: ICD-10-CM

## 2022-06-21 DIAGNOSIS — Z90.710 ACQUIRED ABSENCE OF BOTH CERVIX AND UTERUS: Chronic | ICD-10-CM

## 2022-06-21 DIAGNOSIS — R46.4 SLOWNESS AND POOR RESPONSIVENESS: ICD-10-CM

## 2022-06-21 DIAGNOSIS — I10 ESSENTIAL (PRIMARY) HYPERTENSION: ICD-10-CM

## 2022-06-21 DIAGNOSIS — Z90.49 ACQUIRED ABSENCE OF OTHER SPECIFIED PARTS OF DIGESTIVE TRACT: Chronic | ICD-10-CM

## 2022-06-21 DIAGNOSIS — S72.001A FRACTURE OF UNSPECIFIED PART OF NECK OF RIGHT FEMUR, INITIAL ENCOUNTER FOR CLOSED FRACTURE: Chronic | ICD-10-CM

## 2022-06-21 DIAGNOSIS — Z86.73 PERSONAL HISTORY OF TRANSIENT ISCHEMIC ATTACK (TIA), AND CEREBRAL INFARCTION WITHOUT RESIDUAL DEFICITS: ICD-10-CM

## 2022-06-21 DIAGNOSIS — F03.90 UNSPECIFIED DEMENTIA WITHOUT BEHAVIORAL DISTURBANCE: ICD-10-CM

## 2022-06-21 DIAGNOSIS — E11.9 TYPE 2 DIABETES MELLITUS WITHOUT COMPLICATIONS: ICD-10-CM

## 2022-06-21 LAB
BASOPHILS # BLD AUTO: 0.06 K/UL — SIGNIFICANT CHANGE UP (ref 0–0.2)
BASOPHILS NFR BLD AUTO: 0.7 % — SIGNIFICANT CHANGE UP (ref 0–2)
EOSINOPHIL # BLD AUTO: 0.19 K/UL — SIGNIFICANT CHANGE UP (ref 0–0.5)
EOSINOPHIL NFR BLD AUTO: 2.2 % — SIGNIFICANT CHANGE UP (ref 0–6)
GLUCOSE BLDC GLUCOMTR-MCNC: 108 MG/DL — HIGH (ref 70–99)
HCT VFR BLD CALC: 34.6 % — SIGNIFICANT CHANGE UP (ref 34.5–45)
HGB BLD-MCNC: 10.9 G/DL — LOW (ref 11.5–15.5)
IMM GRANULOCYTES NFR BLD AUTO: 0.5 % — SIGNIFICANT CHANGE UP (ref 0–1.5)
LYMPHOCYTES # BLD AUTO: 2.03 K/UL — SIGNIFICANT CHANGE UP (ref 1–3.3)
LYMPHOCYTES # BLD AUTO: 23.7 % — SIGNIFICANT CHANGE UP (ref 13–44)
MCHC RBC-ENTMCNC: 25.5 PG — LOW (ref 27–34)
MCHC RBC-ENTMCNC: 31.5 G/DL — LOW (ref 32–36)
MCV RBC AUTO: 81 FL — SIGNIFICANT CHANGE UP (ref 80–100)
MONOCYTES # BLD AUTO: 0.85 K/UL — SIGNIFICANT CHANGE UP (ref 0–0.9)
MONOCYTES NFR BLD AUTO: 9.9 % — SIGNIFICANT CHANGE UP (ref 2–14)
NEUTROPHILS # BLD AUTO: 5.38 K/UL — SIGNIFICANT CHANGE UP (ref 1.8–7.4)
NEUTROPHILS NFR BLD AUTO: 63 % — SIGNIFICANT CHANGE UP (ref 43–77)
NRBC # BLD: 0 /100 WBCS — SIGNIFICANT CHANGE UP (ref 0–0)
PLATELET # BLD AUTO: 378 K/UL — SIGNIFICANT CHANGE UP (ref 150–400)
RBC # BLD: 4.27 M/UL — SIGNIFICANT CHANGE UP (ref 3.8–5.2)
RBC # FLD: 16.2 % — HIGH (ref 10.3–14.5)
WBC # BLD: 8.55 K/UL — SIGNIFICANT CHANGE UP (ref 3.8–10.5)
WBC # FLD AUTO: 8.55 K/UL — SIGNIFICANT CHANGE UP (ref 3.8–10.5)

## 2022-06-21 PROCEDURE — 93010 ELECTROCARDIOGRAM REPORT: CPT

## 2022-06-21 PROCEDURE — 99284 EMERGENCY DEPT VISIT MOD MDM: CPT

## 2022-06-21 NOTE — ED ADULT TRIAGE NOTE - CHIEF COMPLAINT QUOTE
per EMS hx of CVA last week , DM , HTN, dementia S/P witness syncopal event in shower. pt is alert x 2

## 2022-06-21 NOTE — ED ADULT NURSE NOTE - NSICDXPASTSURGICALHX_GEN_ALL_CORE_FT
PAST SURGICAL HISTORY:  Broken hip, right, closed, initial encounter pins placed in hip and R knee    H/O: hysterectomy

## 2022-06-21 NOTE — ED PROVIDER NOTE - CLINICAL SUMMARY MEDICAL DECISION MAKING FREE TEXT BOX
Pt back to baseline mental status in ED, will check labs, CT Brain, does not sound like a true syncopal episode. If ED workup negative, favor D/C to outpt care.

## 2022-06-21 NOTE — ED ADULT NURSE NOTE - OBJECTIVE STATEMENT
A&Ox1, post stroke may 9th, as per daughter by bedside, pt woke up around 3am today, no rest all day, and as per daughter, pt appeared to be sleepy around 9 pm, daughter started night time routine and placed pt in shower. In shower pt appeared "to go rigid" she put pt in shower and "she looked like she was going to pass out". As per daughter, "she did not get there because I was pouring water on her, but she looked like she was going there".

## 2022-06-21 NOTE — ED ADULT NURSE NOTE - ED STAT RN HANDOFF DETAILS
Discharge completed for other RN olive.  instructions provided and verbalizes understanding of medication regimen and follow up care. Educational material provided. Denies any pain at this time.

## 2022-06-21 NOTE — ED PROVIDER NOTE - NSICDXPASTMEDICALHX_GEN_ALL_CORE_FT
PAST MEDICAL HISTORY:  Cerebrovascular accident (CVA)     Dementia     DM (diabetes mellitus)     HTN (hypertension)

## 2022-06-21 NOTE — ED PROVIDER NOTE - PATIENT PORTAL LINK FT
You can access the FollowMyHealth Patient Portal offered by [facility] by registering at the following website: [email or web address]. By joining [employer]’s FollowMyHealth portal, you will also be able to view your health information using other applications (apps) compatible with our system.

## 2022-06-22 VITALS
RESPIRATION RATE: 16 BRPM | TEMPERATURE: 98 F | OXYGEN SATURATION: 99 % | SYSTOLIC BLOOD PRESSURE: 125 MMHG | HEART RATE: 70 BPM | DIASTOLIC BLOOD PRESSURE: 56 MMHG

## 2022-06-22 LAB
ALBUMIN SERPL ELPH-MCNC: 3 G/DL — LOW (ref 3.3–5)
ALP SERPL-CCNC: 69 U/L — SIGNIFICANT CHANGE UP (ref 40–120)
ALT FLD-CCNC: 14 U/L — SIGNIFICANT CHANGE UP (ref 12–78)
ANION GAP SERPL CALC-SCNC: 7 MMOL/L — SIGNIFICANT CHANGE UP (ref 5–17)
AST SERPL-CCNC: 9 U/L — LOW (ref 15–37)
BILIRUB SERPL-MCNC: 0.5 MG/DL — SIGNIFICANT CHANGE UP (ref 0.2–1.2)
BUN SERPL-MCNC: 11 MG/DL — SIGNIFICANT CHANGE UP (ref 7–23)
CALCIUM SERPL-MCNC: 9.8 MG/DL — SIGNIFICANT CHANGE UP (ref 8.5–10.1)
CHLORIDE SERPL-SCNC: 106 MMOL/L — SIGNIFICANT CHANGE UP (ref 96–108)
CO2 SERPL-SCNC: 29 MMOL/L — SIGNIFICANT CHANGE UP (ref 22–31)
CREAT SERPL-MCNC: 0.83 MG/DL — SIGNIFICANT CHANGE UP (ref 0.5–1.3)
EGFR: 72 ML/MIN/1.73M2 — SIGNIFICANT CHANGE UP
GLUCOSE SERPL-MCNC: 86 MG/DL — SIGNIFICANT CHANGE UP (ref 70–99)
POTASSIUM SERPL-MCNC: 4.3 MMOL/L — SIGNIFICANT CHANGE UP (ref 3.5–5.3)
POTASSIUM SERPL-SCNC: 4.3 MMOL/L — SIGNIFICANT CHANGE UP (ref 3.5–5.3)
PROT SERPL-MCNC: 7 GM/DL — SIGNIFICANT CHANGE UP (ref 6–8.3)
SODIUM SERPL-SCNC: 142 MMOL/L — SIGNIFICANT CHANGE UP (ref 135–145)
TROPONIN I, HIGH SENSITIVITY RESULT: 7.9 NG/L — SIGNIFICANT CHANGE UP

## 2022-06-22 PROCEDURE — 70450 CT HEAD/BRAIN W/O DYE: CPT | Mod: 26,MA

## 2022-06-23 ENCOUNTER — OUTPATIENT (OUTPATIENT)
Dept: OUTPATIENT SERVICES | Facility: HOSPITAL | Age: 82
LOS: 1 days | End: 2022-06-23

## 2022-06-23 VITALS
HEART RATE: 54 BPM | TEMPERATURE: 97 F | RESPIRATION RATE: 16 BRPM | WEIGHT: 115.96 LBS | DIASTOLIC BLOOD PRESSURE: 70 MMHG | HEIGHT: 59 IN | SYSTOLIC BLOOD PRESSURE: 150 MMHG | OXYGEN SATURATION: 98 %

## 2022-06-23 DIAGNOSIS — Z90.49 ACQUIRED ABSENCE OF OTHER SPECIFIED PARTS OF DIGESTIVE TRACT: Chronic | ICD-10-CM

## 2022-06-23 DIAGNOSIS — Z90.710 ACQUIRED ABSENCE OF BOTH CERVIX AND UTERUS: Chronic | ICD-10-CM

## 2022-06-23 DIAGNOSIS — E11.9 TYPE 2 DIABETES MELLITUS WITHOUT COMPLICATIONS: ICD-10-CM

## 2022-06-23 DIAGNOSIS — Z86.73 PERSONAL HISTORY OF TRANSIENT ISCHEMIC ATTACK (TIA), AND CEREBRAL INFARCTION WITHOUT RESIDUAL DEFICITS: ICD-10-CM

## 2022-06-23 DIAGNOSIS — H61.899 OTHER SPECIFIED DISORDERS OF EXTERNAL EAR, UNSPECIFIED EAR: ICD-10-CM

## 2022-06-23 DIAGNOSIS — H93.8X1 OTHER SPECIFIED DISORDERS OF RIGHT EAR: ICD-10-CM

## 2022-06-23 DIAGNOSIS — S72.001A FRACTURE OF UNSPECIFIED PART OF NECK OF RIGHT FEMUR, INITIAL ENCOUNTER FOR CLOSED FRACTURE: Chronic | ICD-10-CM

## 2022-06-23 DIAGNOSIS — D37.02 NEOPLASM OF UNCERTAIN BEHAVIOR OF TONGUE: ICD-10-CM

## 2022-06-23 DIAGNOSIS — H60.91 UNSPECIFIED OTITIS EXTERNA, RIGHT EAR: ICD-10-CM

## 2022-06-23 DIAGNOSIS — Z98.890 OTHER SPECIFIED POSTPROCEDURAL STATES: Chronic | ICD-10-CM

## 2022-06-23 LAB
A1C WITH ESTIMATED AVERAGE GLUCOSE RESULT: 5.4 % — SIGNIFICANT CHANGE UP (ref 4–5.6)
ESTIMATED AVERAGE GLUCOSE: 108 — SIGNIFICANT CHANGE UP

## 2022-06-23 RX ORDER — ALENDRONATE SODIUM 70 MG/1
1 TABLET ORAL
Qty: 0 | Refills: 0 | DISCHARGE

## 2022-06-23 RX ORDER — ASPIRIN/CALCIUM CARB/MAGNESIUM 324 MG
1 TABLET ORAL
Qty: 0 | Refills: 0 | DISCHARGE

## 2022-06-23 RX ORDER — SITAGLIPTIN AND METFORMIN HYDROCHLORIDE 500; 50 MG/1; MG/1
1 TABLET, FILM COATED ORAL
Qty: 0 | Refills: 0 | DISCHARGE

## 2022-06-23 RX ORDER — PIOGLITAZONE HYDROCHLORIDE 15 MG/1
1 TABLET ORAL
Qty: 0 | Refills: 0 | DISCHARGE

## 2022-06-23 NOTE — H&P PST ADULT - ASSESSMENT
Pt. is a 82 yo female accompanied by her daughter.  Pt. has a right ear mass with drainage.  Pt. is a 80 yo female accompanied by her daughter.  Pt. has a right ear mass with drainage.     Pt. is scheduled for cardiac clearance as requested by the Surgeon.  Will request the clearance be faxed to PST.

## 2022-06-23 NOTE — H&P PST ADULT - NSICDXPASTSURGICALHX_GEN_ALL_CORE_FT
PAST SURGICAL HISTORY:  S/P appendectomy     S/P hysterectomy      PAST SURGICAL HISTORY:  H/O: hysterectomy 1990    History of colon resection 2014    S/P appendectomy     Status post hip surgery right-1/1/2022

## 2022-06-23 NOTE — H&P PST ADULT - PROBLEM SELECTOR PLAN 3
Two days ago, 6/21/22 pt. fell back in the tub and her eyes rolled back as per her daughter.  As a result, she took her to the ER to R/O another stroke.  Pt. had a stroke 1.5 months ago and does not have a Neurologist.  Discussed with Dr. Buenrostro.  Instructed pt's. daughter that pt. will need Neurology clearance.  Pt's. daughter stated she will attempt to find Neurologist that the pt. saw in 2019 and/or call pt's. PMD to request information for a Neurologist.

## 2022-06-23 NOTE — H&P PST ADULT - PROBLEM SELECTOR PLAN 1
Pt. is scheduled for a direct laryngoscopy with biopsy and debridement of right ear canal biopsy 7/13/22.  Pt's. daughter verbalized understanding of instructions.

## 2022-07-06 ENCOUNTER — APPOINTMENT (OUTPATIENT)
Dept: OTOLARYNGOLOGY | Facility: CLINIC | Age: 82
End: 2022-07-06

## 2022-07-06 DIAGNOSIS — H92.11 OTORRHEA, RIGHT EAR: ICD-10-CM

## 2022-07-06 DIAGNOSIS — H60.91 UNSPECIFIED OTITIS EXTERNA, RIGHT EAR: ICD-10-CM

## 2022-07-06 DIAGNOSIS — M86.9 OSTEOMYELITIS, UNSPECIFIED: ICD-10-CM

## 2022-07-06 DIAGNOSIS — H60.22 MALIGNANT OTITIS EXTERNA, LEFT EAR: ICD-10-CM

## 2022-07-06 PROBLEM — H93.8X1 OTHER SPECIFIED DISORDERS OF RIGHT EAR: Chronic | Status: ACTIVE | Noted: 2022-06-23

## 2022-07-06 PROBLEM — Z87.19 PERSONAL HISTORY OF OTHER DISEASES OF THE DIGESTIVE SYSTEM: Chronic | Status: ACTIVE | Noted: 2022-06-23

## 2022-07-06 PROBLEM — S72.009A FRACTURE OF UNSPECIFIED PART OF NECK OF UNSPECIFIED FEMUR, INITIAL ENCOUNTER FOR CLOSED FRACTURE: Chronic | Status: ACTIVE | Noted: 2022-06-23

## 2022-07-06 PROBLEM — G30.9 ALZHEIMER'S DISEASE, UNSPECIFIED: Chronic | Status: ACTIVE | Noted: 2022-06-23

## 2022-07-06 PROBLEM — H91.90 UNSPECIFIED HEARING LOSS, UNSPECIFIED EAR: Chronic | Status: ACTIVE | Noted: 2022-06-23

## 2022-07-06 PROCEDURE — 92557 COMPREHENSIVE HEARING TEST: CPT

## 2022-07-06 PROCEDURE — 92504 EAR MICROSCOPY EXAMINATION: CPT

## 2022-07-06 PROCEDURE — 99214 OFFICE O/P EST MOD 30 MIN: CPT | Mod: 25

## 2022-07-06 PROCEDURE — 92567 TYMPANOMETRY: CPT

## 2022-07-06 RX ORDER — DIAZEPAM 5 MG/1
5 TABLET ORAL
Qty: 1 | Refills: 0 | Status: ACTIVE | COMMUNITY
Start: 2022-07-06 | End: 1900-01-01

## 2022-07-06 NOTE — HISTORY OF PRESENT ILLNESS
[de-identified] : 80 y/o F with alzheimer's referred by Dr. Nguyen presents with daughter for evaluation for right ear drainage, itchiness, and otalgia that has been ongoing since 4/10/22. Reports popping/crackling sound in ear. Patient denies dizziness, vertigo, or headaches related to hearing.

## 2022-07-06 NOTE — ASSESSMENT
[FreeTextEntry1] : 81-year-old female with right ear pain and drainage that began in April 2022.  Has history of diabetes mellitus.  Also has concern for right throat lesion and is set for DL/biopsy.  Has been treated with antibiotic drops without improvement.  Exam today shows normal bilateral facial nerve function, polyp formation in right ear canal with significant diffuse edema and purulence.  Left ear canal patent and left tympanic membrane intact without effusion or retraction.  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  I personally reviewed and interpreted prior temporal bone CT images and the report, which shows diffuse opacification of the right ear canal.  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  I started her empirically on Augmentin/oral ciprofloxacin and recommended urgent referral to infectious disease.  I took a culture today of the right ear canal discharge, and we will also plan to obtain an MRI takes determine the extent of soft tissue inflammation.  Lastly, I plan to perform repeat biopsy of the right ear canal polyp to confirm no malignancy.  Prior pathology results indicated no sign of malignancy.\par \par

## 2022-07-06 NOTE — CONSULT LETTER
[FreeTextEntry2] : Arden Nguyen MD [FreeTextEntry1] : Dear Arden,\par \par Thanks for referring Hill Shah for evaluation of her chronic right ear drainage that began in April 2022.  Her exam today shows a large polyp in the right ear canal with surrounding diffuse edema.  Her recent temporal bone CT images show opacification of the right ear canal as well as minor erosive changes along the mastoid cortex concerning for osteomyelitis.  I reviewed recent polyp biopsy results from your office which showed no evidence of malignancy.  Given her history of diabetes mellitus, I am most concerned that she has right skull base osteomyelitis/necrotizing otitis externa.  I plan to start her empirically on Augmentin/oral ciprofloxacin and also took a culture today.  We will have her see infectious disease for consideration of a PICC line and IV antibiotic therapy in the coming weeks if there is no improvement with prolonged oral therapy.\par \par Thank you once again for the opportunity to participate in your patient's care, and I will keep you informed as to her progress.\par \par Best regards,\par \par Derik Pereyra MD\par Otology/Neurotology\par [facility]\par [facility]

## 2022-07-06 NOTE — REASON FOR VISIT
[Initial Evaluation] : an initial evaluation for [Ear Drainage] : ear drainage [Ear Pain] : ear pain

## 2022-07-12 LAB — EAR NOSE AND THROAT CULTURE: ABNORMAL

## 2022-07-12 NOTE — ED PROVIDER NOTE - CONSTITUTIONAL, MLM
Chief Complaint   Patient presents with     Physical     Fasting today      Palpitations     Heart racing at times up to 200 BPM     Menopausal Sx     Discuss menopause     Pre-visit Screening:  Immunizations:  not up to date - TD today, shingrix at pharmacy  Colonoscopy:  is due and ordered today  Mammogram: is due and ordered today  Asthma Action Test/Plan:  NA  PHQ9:  NA  GAD7:  NA  Questioned patient about current smoking habits Pt. has never smoked.  Ok to leave detailed message on voice mail for today's visit only Yes, phone # [phone]       normal... Well appearing, awake, alert, oriented to person, place, time/situation and in no apparent distress.

## 2022-07-13 ENCOUNTER — APPOINTMENT (OUTPATIENT)
Dept: OTOLARYNGOLOGY | Facility: HOSPITAL | Age: 82
End: 2022-07-13

## 2022-07-19 ENCOUNTER — APPOINTMENT (OUTPATIENT)
Dept: OTOLARYNGOLOGY | Facility: CLINIC | Age: 82
End: 2022-07-19

## 2022-07-20 ENCOUNTER — APPOINTMENT (OUTPATIENT)
Dept: NEUROLOGY | Facility: CLINIC | Age: 82
End: 2022-07-20

## 2022-07-21 ENCOUNTER — APPOINTMENT (OUTPATIENT)
Dept: INFECTIOUS DISEASE | Facility: CLINIC | Age: 82
End: 2022-07-21

## 2022-07-21 VITALS
TEMPERATURE: 97.2 F | WEIGHT: 126 LBS | HEART RATE: 68 BPM | BODY MASS INDEX: 21.51 KG/M2 | DIASTOLIC BLOOD PRESSURE: 71 MMHG | OXYGEN SATURATION: 98 % | HEIGHT: 64 IN | SYSTOLIC BLOOD PRESSURE: 125 MMHG

## 2022-07-21 DIAGNOSIS — H70.11 CHRONIC MASTOIDITIS, RIGHT EAR: ICD-10-CM

## 2022-07-21 DIAGNOSIS — F03.90 UNSPECIFIED DEMENTIA W/OUT BEHAVIORAL DISTURBANCE: ICD-10-CM

## 2022-07-21 DIAGNOSIS — H60.21 MALIGNANT OTITIS EXTERNA, RIGHT EAR: ICD-10-CM

## 2022-07-21 PROCEDURE — 99204 OFFICE O/P NEW MOD 45 MIN: CPT

## 2022-07-21 NOTE — REVIEW OF SYSTEMS
[Difficulty Sleeping] : difficulty sleeping [Earache] : earache [Confused] : confusion [Negative] : Musculoskeletal [Fever] : no fever [Chills] : no chills [FreeTextEntry4] : drainage from right hear [de-identified] : c

## 2022-07-21 NOTE — ASSESSMENT
[Treatment Education] : treatment education [Rx Dose / Side Effects] : Rx dose/side effects [Medical Care Issues] : medical care issues

## 2022-07-22 PROBLEM — H70.11 CHRONIC MASTOIDITIS OF RIGHT SIDE: Status: ACTIVE | Noted: 2022-07-21

## 2022-07-22 RX ORDER — CIPROFLOXACIN HYDROCHLORIDE 500 MG/1
500 TABLET, FILM COATED ORAL
Qty: 84 | Refills: 0 | Status: ACTIVE | COMMUNITY
Start: 2022-07-22 | End: 1900-01-01

## 2022-07-22 RX ORDER — CLINDAMYCIN HYDROCHLORIDE 300 MG/1
300 CAPSULE ORAL EVERY 6 HOURS
Qty: 84 | Refills: 0 | Status: DISCONTINUED | COMMUNITY
Start: 2022-07-06 | End: 2022-07-22

## 2022-08-01 ENCOUNTER — RESULT REVIEW (OUTPATIENT)
Age: 82
End: 2022-08-01

## 2022-08-08 ENCOUNTER — OUTPATIENT (OUTPATIENT)
Dept: OUTPATIENT SERVICES | Facility: HOSPITAL | Age: 82
LOS: 1 days | End: 2022-08-08

## 2022-08-08 ENCOUNTER — RESULT REVIEW (OUTPATIENT)
Age: 82
End: 2022-08-08

## 2022-08-08 ENCOUNTER — APPOINTMENT (OUTPATIENT)
Dept: MRI IMAGING | Facility: CLINIC | Age: 82
End: 2022-08-08

## 2022-08-08 VITALS
HEART RATE: 62 BPM | WEIGHT: 106.92 LBS | HEIGHT: 62 IN | SYSTOLIC BLOOD PRESSURE: 101 MMHG | RESPIRATION RATE: 16 BRPM | TEMPERATURE: 97 F | OXYGEN SATURATION: 98 % | DIASTOLIC BLOOD PRESSURE: 67 MMHG

## 2022-08-08 DIAGNOSIS — E11.9 TYPE 2 DIABETES MELLITUS WITHOUT COMPLICATIONS: ICD-10-CM

## 2022-08-08 DIAGNOSIS — D37.02 NEOPLASM OF UNCERTAIN BEHAVIOR OF TONGUE: ICD-10-CM

## 2022-08-08 DIAGNOSIS — R56.9 UNSPECIFIED CONVULSIONS: ICD-10-CM

## 2022-08-08 DIAGNOSIS — Z98.890 OTHER SPECIFIED POSTPROCEDURAL STATES: Chronic | ICD-10-CM

## 2022-08-08 DIAGNOSIS — Z90.710 ACQUIRED ABSENCE OF BOTH CERVIX AND UTERUS: Chronic | ICD-10-CM

## 2022-08-08 DIAGNOSIS — S72.001A FRACTURE OF UNSPECIFIED PART OF NECK OF RIGHT FEMUR, INITIAL ENCOUNTER FOR CLOSED FRACTURE: Chronic | ICD-10-CM

## 2022-08-08 DIAGNOSIS — Z90.49 ACQUIRED ABSENCE OF OTHER SPECIFIED PARTS OF DIGESTIVE TRACT: Chronic | ICD-10-CM

## 2022-08-08 DIAGNOSIS — I63.9 CEREBRAL INFARCTION, UNSPECIFIED: ICD-10-CM

## 2022-08-08 DIAGNOSIS — G47.33 OBSTRUCTIVE SLEEP APNEA (ADULT) (PEDIATRIC): ICD-10-CM

## 2022-08-08 DIAGNOSIS — I10 ESSENTIAL (PRIMARY) HYPERTENSION: ICD-10-CM

## 2022-08-08 PROCEDURE — 70551 MRI BRAIN STEM W/O DYE: CPT

## 2022-08-08 RX ORDER — SERTRALINE 25 MG/1
1 TABLET, FILM COATED ORAL
Qty: 0 | Refills: 0 | DISCHARGE

## 2022-08-08 RX ORDER — METOPROLOL TARTRATE 50 MG
1 TABLET ORAL
Qty: 0 | Refills: 0 | DISCHARGE

## 2022-08-08 NOTE — H&P PST ADULT - PROBLEM SELECTOR PLAN 6
Per daughter, pt went for neurology clearance - requested copy.   Pt instructed to take her Levetiracetam the morning of surgery.

## 2022-08-08 NOTE — H&P PST ADULT - REASON FOR ADMISSION
"She is having biopsy for throat and ear" "She is having biopsy for throat and ear" - per daughter Georgie

## 2022-08-08 NOTE — H&P PST ADULT - HISTORY OF PRESENT ILLNESS
81 year female with lesion in throat and in ear for past few months. Pt's ear was draining blood and pus, being treated with antibiotics since 4/2022. Pt presents today for presurgical evaluation for ...    Pt was scheduled for procedure last month but case was postponed due to insurance issues.  81 year female with lesion in throat and in ear for past few months. Pt's ear was draining blood and pus, being treated with antibiotics since 4/2022. Pt presents today for presurgical evaluation for Direct Laryngoscopy with Biopsy and Debridement of Right Ear Canal with Biopsy.     Pt was scheduled for procedure last month but case was postponed due to insurance issues.  81 year female with lesion in throat and in ear for past few months. Pt's ear was draining blood and pus, being treated with antibiotics since 4/2022. Pt presents today for presurgical evaluation for Direct Laryngoscopy with Biopsy and Debridement of Right Ear Canal with Biopsy.   Pt with hx of Alzheimers - accompanied by daughter Georgie who provided history.     Pt was scheduled for procedure last month but case was postponed due to insurance issues.

## 2022-08-08 NOTE — H&P PST ADULT - PROBLEM SELECTOR PLAN 1
Pt scheduled for surgery on 8/10/22.  Pre-op instructions provided. Pt verbalized understanding.   Pepcid provided for GI prophylaxis.   Advised for pt to go to [facility] for COVID PCR testing prior to surgery. Pt scheduled for surgery on 8/10/22.  Pre-op instructions provided. Pt verbalized understanding.   Pepcid provided for GI prophylaxis.   Advised pt that she needs COVID PCR testing prior to surgery. Advised that she can go to [facility] for testing.  Pt's daughters are her health care proxies and will be present on day of surgery.  Copy of cardiac and medical clearances in chart.

## 2022-08-08 NOTE — H&P PST ADULT - NSICDXPASTMEDICALHX_GEN_ALL_CORE_FT
PAST MEDICAL HISTORY:  2019 novel coronavirus disease (COVID-19) 4/2020 - was hospitalized, not intubated    Alzheimers disease     Diabetes mellitus type II     Dyslipidemia     Fracture of hip and knee, right-12/31/2021    H/O osteoporosis     H/O sleep apnea could not tolerate CPAP    Hearing loss     History of diverticulitis     History of seizures since stroke 5/2022    HTN     Mass of right ear canal     Stroke 5/9/2022 - on Eliquis    Tongue lesion

## 2022-08-08 NOTE — H&P PST ADULT - NSICDXPASTSURGICALHX_GEN_ALL_CORE_FT
PAST SURGICAL HISTORY:  H/O: hysterectomy 1990    History of colon resection 2014    S/P appendectomy     Status post hip surgery right-1/1/2022

## 2022-08-08 NOTE — H&P PST ADULT - ACTIVITY
walks with walker and assitance at home, needs assistance for ADLs walks with walker and assistance at home, needs assistance for ADLs and transfers

## 2022-08-09 ENCOUNTER — TRANSCRIPTION ENCOUNTER (OUTPATIENT)
Age: 82
End: 2022-08-09

## 2022-08-09 PROBLEM — Z87.898 PERSONAL HISTORY OF OTHER SPECIFIED CONDITIONS: Chronic | Status: ACTIVE | Noted: 2022-08-08

## 2022-08-09 PROBLEM — K14.8 OTHER DISEASES OF TONGUE: Chronic | Status: ACTIVE | Noted: 2022-08-08

## 2022-08-09 PROBLEM — I63.9 CEREBRAL INFARCTION, UNSPECIFIED: Chronic | Status: ACTIVE | Noted: 2022-06-23

## 2022-08-09 PROBLEM — Z87.39 PERSONAL HISTORY OF OTHER DISEASES OF THE MUSCULOSKELETAL SYSTEM AND CONNECTIVE TISSUE: Chronic | Status: ACTIVE | Noted: 2022-08-08

## 2022-08-09 PROBLEM — U07.1 COVID-19: Chronic | Status: ACTIVE | Noted: 2022-08-08

## 2022-08-09 PROBLEM — Z86.69 PERSONAL HISTORY OF OTHER DISEASES OF THE NERVOUS SYSTEM AND SENSE ORGANS: Chronic | Status: ACTIVE | Noted: 2022-08-08

## 2022-08-09 LAB — SARS-COV-2 RNA SPEC QL NAA+PROBE: SIGNIFICANT CHANGE UP

## 2022-08-09 NOTE — ASU PATIENT PROFILE, ADULT - FALL HARM RISK - HARM RISK INTERVENTIONS

## 2022-08-09 NOTE — ASU PATIENT PROFILE, ADULT - AS SC BRADEN FRICTION
Patient discharge instructions reviewed; Patient verbalized understanding of plan of care.     (2) potential problem

## 2022-08-10 ENCOUNTER — TRANSCRIPTION ENCOUNTER (OUTPATIENT)
Age: 82
End: 2022-08-10

## 2022-08-10 ENCOUNTER — RESULT REVIEW (OUTPATIENT)
Age: 82
End: 2022-08-10

## 2022-08-10 ENCOUNTER — APPOINTMENT (OUTPATIENT)
Dept: OTOLARYNGOLOGY | Facility: HOSPITAL | Age: 82
End: 2022-08-10

## 2022-08-10 ENCOUNTER — OUTPATIENT (OUTPATIENT)
Dept: OUTPATIENT SERVICES | Facility: HOSPITAL | Age: 82
LOS: 1 days | Discharge: HOME | End: 2022-08-10

## 2022-08-10 VITALS
DIASTOLIC BLOOD PRESSURE: 49 MMHG | OXYGEN SATURATION: 100 % | SYSTOLIC BLOOD PRESSURE: 139 MMHG | RESPIRATION RATE: 16 BRPM | HEIGHT: 62 IN | WEIGHT: 106.92 LBS | HEART RATE: 59 BPM | TEMPERATURE: 98 F

## 2022-08-10 VITALS
OXYGEN SATURATION: 100 % | SYSTOLIC BLOOD PRESSURE: 131 MMHG | HEART RATE: 87 BPM | RESPIRATION RATE: 17 BRPM | DIASTOLIC BLOOD PRESSURE: 60 MMHG

## 2022-08-10 DIAGNOSIS — Z90.710 ACQUIRED ABSENCE OF BOTH CERVIX AND UTERUS: Chronic | ICD-10-CM

## 2022-08-10 DIAGNOSIS — Z98.890 OTHER SPECIFIED POSTPROCEDURAL STATES: Chronic | ICD-10-CM

## 2022-08-10 DIAGNOSIS — Z90.49 ACQUIRED ABSENCE OF OTHER SPECIFIED PARTS OF DIGESTIVE TRACT: Chronic | ICD-10-CM

## 2022-08-10 DIAGNOSIS — S72.001A FRACTURE OF UNSPECIFIED PART OF NECK OF RIGHT FEMUR, INITIAL ENCOUNTER FOR CLOSED FRACTURE: Chronic | ICD-10-CM

## 2022-08-10 DIAGNOSIS — D37.02 NEOPLASM OF UNCERTAIN BEHAVIOR OF TONGUE: ICD-10-CM

## 2022-08-10 LAB — GLUCOSE BLDC GLUCOMTR-MCNC: 164 MG/DL — HIGH (ref 70–99)

## 2022-08-10 PROCEDURE — 88305 TISSUE EXAM BY PATHOLOGIST: CPT | Mod: 26

## 2022-08-10 PROCEDURE — 42826 REMOVAL OF TONSILS: CPT | Mod: GC

## 2022-08-10 PROCEDURE — 43191 ESOPHAGOSCOPY RIGID TRNSO DX: CPT | Mod: GC

## 2022-08-10 PROCEDURE — 31535 LARYNGOSCOPY W/BIOPSY: CPT | Mod: GC

## 2022-08-10 RX ORDER — FENTANYL CITRATE 50 UG/ML
25 INJECTION INTRAVENOUS
Refills: 0 | Status: DISCONTINUED | OUTPATIENT
Start: 2022-08-10 | End: 2022-08-10

## 2022-08-10 RX ORDER — APIXABAN 2.5 MG/1
1 TABLET, FILM COATED ORAL
Qty: 0 | Refills: 0 | DISCHARGE

## 2022-08-10 RX ORDER — PREGABALIN 225 MG/1
1 CAPSULE ORAL
Qty: 0 | Refills: 0 | DISCHARGE

## 2022-08-10 RX ORDER — LEVETIRACETAM 250 MG/1
1 TABLET, FILM COATED ORAL
Qty: 0 | Refills: 0 | DISCHARGE

## 2022-08-10 RX ORDER — ONDANSETRON 8 MG/1
4 TABLET, FILM COATED ORAL ONCE
Refills: 0 | Status: DISCONTINUED | OUTPATIENT
Start: 2022-08-10 | End: 2022-08-24

## 2022-08-10 RX ORDER — AMLODIPINE BESYLATE 2.5 MG/1
1 TABLET ORAL
Qty: 0 | Refills: 0 | DISCHARGE

## 2022-08-10 RX ORDER — AMOXICILLIN AND CLAVULANATE POTASSIUM 875; 125 MG/1; MG/1
875-125 TABLET, COATED ORAL TWICE DAILY
Qty: 42 | Refills: 0 | Status: ACTIVE | COMMUNITY
Start: 2022-07-06 | End: 1900-01-01

## 2022-08-10 RX ORDER — QUETIAPINE FUMARATE 200 MG/1
0 TABLET, FILM COATED ORAL
Qty: 0 | Refills: 0 | DISCHARGE

## 2022-08-10 RX ORDER — METOPROLOL TARTRATE 50 MG
1 TABLET ORAL
Qty: 0 | Refills: 0 | DISCHARGE

## 2022-08-10 RX ORDER — LISINOPRIL 2.5 MG/1
1 TABLET ORAL
Qty: 0 | Refills: 0 | DISCHARGE

## 2022-08-10 RX ORDER — ATORVASTATIN CALCIUM 80 MG/1
1 TABLET, FILM COATED ORAL
Qty: 0 | Refills: 0 | DISCHARGE

## 2022-08-10 RX ORDER — SITAGLIPTIN AND METFORMIN HYDROCHLORIDE 500; 50 MG/1; MG/1
1 TABLET, FILM COATED ORAL
Qty: 0 | Refills: 0 | DISCHARGE

## 2022-08-10 RX ORDER — CIPROFLOXACIN LACTATE 400MG/40ML
1 VIAL (ML) INTRAVENOUS
Qty: 0 | Refills: 0 | DISCHARGE

## 2022-08-10 RX ORDER — ASPIRIN/CALCIUM CARB/MAGNESIUM 324 MG
1 TABLET ORAL
Qty: 0 | Refills: 0 | DISCHARGE

## 2022-08-10 NOTE — ASU DISCHARGE PLAN (ADULT/PEDIATRIC) - CARE PROVIDER_API CALL
Jluis Welsh)  Otolaryngology  [address] [place] [address]  Phone: [phone]  Fax: [phone]  Follow Up Time:

## 2022-08-10 NOTE — BRIEF OPERATIVE NOTE - OPERATION/FINDINGS
Direct laryngoscopy with evidence of lesions. Palpation of oral cavity and oropharynx without evidence of lesions. Left EAC clear, TM intact, no effusions. R EAC with polypoid lesion vs granuloma, excised and sent for pathology. Debris suctioned. TM intact, no effusions. Hemostasis with Afrin and chemical cauterization. Direct laryngoscopy without evidence of lesions. Palpation of oral cavity and oropharynx without evidence of lesions. Left EAC clear, TM intact, no effusions. R EAC with polypoid lesion vs granuloma, excised and sent for pathology. Debris suctioned. TM intact, no effusions. Hemostasis with Afrin and chemical cauterization.

## 2022-08-10 NOTE — ASU PREOP CHECKLIST - AS BP NONINV METHOD
Spoke with mom, states pt is interested in having an IUD, she is looking to avoid hormonal birth control.  Explained to mom that we do not provide IUD placement.  IUD's are placed by ob/gyne.  Mom states understanding and that she will contact appropriate provider.   electronic

## 2022-08-10 NOTE — ASU DISCHARGE PLAN (ADULT/PEDIATRIC) - CALL YOUR DOCTOR IF YOU HAVE ANY OF THE FOLLOWING:
Bleeding that does not stop/Pain not relieved by Medications/Fever greater than (need to indicate Fahrenheit or Celsius) Bleeding that does not stop/Swelling that gets worse/Pain not relieved by Medications/Fever greater than (need to indicate Fahrenheit or Celsius)/Wound/Surgical Site with redness, or foul smelling discharge or pus/Nausea and vomiting that does not stop/Unable to urinate/Inability to tolerate liquids or foods/Increased irritability or sluggishness

## 2022-08-10 NOTE — ASU DISCHARGE PLAN (ADULT/PEDIATRIC) - NS MD DC FALL RISK RISK
For information on Fall & Injury Prevention, visit: [email or web address] OR  [email or web address] OR  https://www.cdc.gov/steadi/patient.html

## 2022-08-10 NOTE — ASU DISCHARGE PLAN (ADULT/PEDIATRIC) - FOLLOW UP APPOINTMENTS
may also call Recovery Room (PACU) 24/7 @ [phone]/[facility]

## 2022-08-10 NOTE — ASU PREOP CHECKLIST - 4.
intial FS 65, immediate repeat 73, dr swift from anesthesia @ bedside, no interventions @ this time, as per MD will address hypoglycemia in OR

## 2022-08-10 NOTE — ASU DISCHARGE PLAN (ADULT/PEDIATRIC) - NURSING INSTRUCTIONS
You received IV Tylenol for pain management at _1030__. Please DO NOT take any Tylenol (Acetaminophen) containing products, such as Vicodin, Percocet, Excedrin, and cold medications for the next 6 hours (until __430_ PM). DO NOT TAKE MORE THAN 3000 MG OF TYLENOL in a 24 hour period.

## 2022-08-10 NOTE — ASU DISCHARGE PLAN (ADULT/PEDIATRIC) - ASU DC SPECIAL INSTRUCTIONSFT
- Resume any home medications  - Continue with a normal diet  - For pain, you may use acetaminophen (Tylenol) and ibuprofen (Advil/Motrin) as needed. Follow all instructions on bottle. These are over the counter.  - Follow up with Dr. Welsh. Call his office to schedule an appointment if needed. - Use ofloxacin drops. 5 drops in R ear, twice a day, for 5 days.  - Resume any home medications  - Continue with a normal diet  - For pain, you may use acetaminophen (Tylenol) and ibuprofen (Advil/Motrin) as needed. Follow all instructions on bottle. These are over the counter.  - Follow up with Dr. Welsh and Dr. Pereyra. Call their offices to schedule an appointment if needed.

## 2022-08-10 NOTE — BRIEF OPERATIVE NOTE - NSICDXBRIEFPROCEDURE_GEN_ALL_CORE_FT
PROCEDURES:  Direct laryngoscopy in adult 10-Aug-2022 11:14:52  Arden Zheng  Excision of mass of external ear 10-Aug-2022 11:15:58  Arden Zheng  Exam under anesthesia, ear 10-Aug-2022 11:16:14  Arden Zheng

## 2022-08-10 NOTE — ASU PREOP CHECKLIST - COMMENTS
lisinopril, Eliquis, asa, Keppra, Cipro, Pepcid in AM w/ sip of water amlodipine, lisinopril, Eliquis, asa, Keppra, Cipro, Pepcid in AM w/ sip of water

## 2022-08-10 NOTE — ASU DISCHARGE PLAN (ADULT/PEDIATRIC) - PATIENT EDUCATION MATERIALS PROVIED
Provider pre-printed instructions given Information about your recovery and anesthesia/Provider pre-printed instructions given/Other (specify)

## 2022-08-10 NOTE — BRIEF OPERATIVE NOTE - ASSISTANT(S)
Compazine sent to pharmacy  
Patient states Ondansetron is not helping for nausea. Patient asking for alternative medication. Patient states she has an allergy on file to another anti-nausea medication, patient could not recall name.  Patient also states she needs information on what to do with her situation with her . Patient states Rosio with Dr. Lundberg is already aware. Please advise.  
Please advise on medication and her other situation.  
Norm

## 2022-08-11 LAB
GLUCOSE BLDC GLUCOMTR-MCNC: 65 MG/DL — LOW (ref 70–99)
GLUCOSE BLDC GLUCOMTR-MCNC: 73 MG/DL — SIGNIFICANT CHANGE UP (ref 70–99)

## 2022-08-16 ENCOUNTER — APPOINTMENT (OUTPATIENT)
Dept: OTOLARYNGOLOGY | Facility: CLINIC | Age: 82
End: 2022-08-16

## 2022-08-16 VITALS
WEIGHT: 101 LBS | DIASTOLIC BLOOD PRESSURE: 65 MMHG | BODY MASS INDEX: 16.83 KG/M2 | SYSTOLIC BLOOD PRESSURE: 105 MMHG | HEIGHT: 65 IN | HEART RATE: 86 BPM

## 2022-08-16 DIAGNOSIS — H61.899 OTHER SPECIFIED DISORDERS OF EXTERNAL EAR, UNSPECIFIED EAR: ICD-10-CM

## 2022-08-16 DIAGNOSIS — D37.02 NEOPLASM OF UNCERTAIN BEHAVIOR OF TONGUE: ICD-10-CM

## 2022-08-16 LAB
ALBUMIN SERPL ELPH-MCNC: 4.4 G/DL
ALP BLD-CCNC: 76 U/L
ALT SERPL-CCNC: 6 U/L
ANION GAP SERPL CALC-SCNC: 12 MMOL/L
AST SERPL-CCNC: 11 U/L
BASOPHILS # BLD AUTO: 0.07 K/UL
BASOPHILS NFR BLD AUTO: 0.8 %
BILIRUB SERPL-MCNC: 0.5 MG/DL
BUN SERPL-MCNC: 19 MG/DL
CALCIUM SERPL-MCNC: 10.5 MG/DL
CHLORIDE SERPL-SCNC: 105 MMOL/L
CO2 SERPL-SCNC: 24 MMOL/L
CREAT SERPL-MCNC: 1.17 MG/DL
CRP SERPL-MCNC: <3 MG/L
EGFR: 47 ML/MIN/1.73M2
EOSINOPHIL # BLD AUTO: 0.16 K/UL
EOSINOPHIL NFR BLD AUTO: 1.8 %
ERYTHROCYTE [SEDIMENTATION RATE] IN BLOOD BY WESTERGREN METHOD: 53 MM/HR
GLUCOSE SERPL-MCNC: 87 MG/DL
HCT VFR BLD CALC: 38.2 %
HGB BLD-MCNC: 11.6 G/DL
IMM GRANULOCYTES NFR BLD AUTO: 0.2 %
LYMPHOCYTES # BLD AUTO: 3.12 K/UL
LYMPHOCYTES NFR BLD AUTO: 34.3 %
MAN DIFF?: NORMAL
MCHC RBC-ENTMCNC: 24.9 PG
MCHC RBC-ENTMCNC: 30.4 GM/DL
MCV RBC AUTO: 82.2 FL
MONOCYTES # BLD AUTO: 0.72 K/UL
MONOCYTES NFR BLD AUTO: 7.9 %
NEUTROPHILS # BLD AUTO: 5.01 K/UL
NEUTROPHILS NFR BLD AUTO: 55 %
PLATELET # BLD AUTO: 381 K/UL
POTASSIUM SERPL-SCNC: 4.7 MMOL/L
PROT SERPL-MCNC: 7.7 G/DL
RBC # BLD: 4.65 M/UL
RBC # FLD: 16.7 %
SODIUM SERPL-SCNC: 142 MMOL/L
WBC # FLD AUTO: 9.1 K/UL

## 2022-08-16 PROCEDURE — 99213 OFFICE O/P EST LOW 20 MIN: CPT | Mod: 24

## 2022-08-16 NOTE — CONSULT LETTER
[Sincerely,] : Sincerely, [Dear  ___] : Dear ~MARIBELL, [Courtesy Letter:] : I had the pleasure of seeing your patient, [unfilled], in my office today. [Please see my note below.] : Please see my note below. [Consult Closing:] : Thank you very much for allowing me to participate in the care of this patient.  If you have any questions, please do not hesitate to contact me. [FreeTextEntry2] : Cheryl Heath NP [FreeTextEntry3] : Jluis Welsh MD, FACS\par Chief of Otolaryngology at [facility]\par \par Dept. of Otolaryngology\par [facility]

## 2022-08-16 NOTE — REASON FOR VISIT
[Subsequent Evaluation] : a subsequent evaluation for [Formal Caregiver] : formal caregiver [Family Member] : family member [FreeTextEntry2] : s/p direct laryngoscopy with biopsy and R EAC lesion biopsy

## 2022-08-16 NOTE — ASSESSMENT
[FreeTextEntry1] : Path is pending.  Pt to f/u after path is done.\par \par Pt is no longer eating solid food.  Family is considering hospice care, given that she has severe coexistent Alzheimer's disease.  \par \par Will discuss further once path is completed.  Option of PEG placement discussed.

## 2022-08-16 NOTE — HISTORY OF PRESENT ILLNESS
[de-identified] : 81 year old woman with h/o Alzheimer's, here for follow up s/p direct laryngoscopy with biopsy and debridement of Right ear canal with biopsy 8/10/22\par History of growth of BOT, Right otitis externa and ear lesion\par Pathology pending\par Daughter reports overall fatigue, generalized weakness; able to walk and talk again since procedure\par Tolerating liquid diet, apple sauce and thickened packets for water to assist with dysphagia - Losing weight\par Current weight 101 and has lost 6 lbs over the past 10 days.\par Denies dyspnea, dysphagia, hemoptysis, recent fevers or chills.

## 2022-08-16 NOTE — PHYSICAL EXAM
[FreeTextEntry1] : Agitated, but ultimately cooperative [de-identified] : R EAC lesion gone and is healing well. [Midline] : trachea located in midline position [Laryngoscopy Performed] : laryngoscopy was performed, see procedure section for findings [Normal] : no rashes

## 2022-08-18 ENCOUNTER — APPOINTMENT (OUTPATIENT)
Dept: INFECTIOUS DISEASE | Facility: CLINIC | Age: 82
End: 2022-08-18

## 2022-08-22 LAB — SURGICAL PATHOLOGY STUDY: SIGNIFICANT CHANGE UP

## 2022-08-23 ENCOUNTER — NON-APPOINTMENT (OUTPATIENT)
Age: 82
End: 2022-08-23

## 2022-08-31 ENCOUNTER — APPOINTMENT (OUTPATIENT)
Dept: OTOLARYNGOLOGY | Facility: CLINIC | Age: 82
End: 2022-08-31

## 2022-09-02 ENCOUNTER — NON-APPOINTMENT (OUTPATIENT)
Age: 82
End: 2022-09-02

## 2022-11-21 NOTE — PHYSICAL THERAPY INITIAL EVALUATION ADULT - GAIT DEVIATIONS NOTED, PT EVAL
[FreeTextEntry3] : Trigger finger injection was performed because of pain inflammation and stiffness\par Anesthesia: ethyl chloride sprayed topically\par Dexamethasone injection of 1cc\par Lidocaine: An injection of Lidocaine 1% 1cc\par \par Patient has tried OTC's including aspirin, Ibuprofen, Aleve etc or prescription NSAIDS, and/or exercises at home and/ or\par physical therapy without satisfactory response.\par After verbal consent using sterile preparation and technique. The risks, benefits, and alternatives to cortisone injection\par were explained in full to the patient. Risks outlined include but are not limited to infection, sepsis, bleeding, scarring, skin\par discoloration, temporary increase in pain, syncopal episode, failure to resolve symptoms, allergic reaction, symptom\par recurrence, and elevation of blood sugar in diabetics. Patient understood the risks. All questions were answered. After\par discussion of options, patient requested an injection. Oral informed consent was obtained and sterile prep was done of the\par injection site. Sterile technique was utilized for the procedure including the preparation of the solutions used for the\par injection. Patient tolerated the procedure well. Advised to ice the injection site this evening.\par Prep with ETOH  locally to site. Sterile technique used\par  right middle finger
decreased john/decreased step length/decreased stride length

## 2022-12-08 NOTE — ED PROVIDER NOTE - BIRTH SEX
Take antibiotic as prescribed  Recommend probiotic use while taking antibiotic  Use benzonatate as directed as needed for cough  Acetaminophen for fever and pain  Follow-up with PCP in 3-5 days  Go to ER if symptoms worsen  Female

## 2023-02-08 NOTE — HISTORY OF PRESENT ILLNESS
Occupational Therapy    Visit Type: initial evaluation  Co-treat with: Physical therapist  Precautions:  Medical precautions:  fall risk;.   Lines:     Basic: telemetry, capped IV, external urinary catheter and O2 (4L O2 NC)      Lines in chart and on patient reviewed, precautions maintained throughout session.  Upper Extremity:    LUE limb alert; per pt, BP only on R forearm  Hearing: hard of hearing  Vision:     Current vision: wears glasses all the time  Safety Measures: chair alarm  SUBJECTIVE  Patient agreed to participate in therapy this date.  Patient / Family Goal: maximize function    Pain     Location: migraine    At onset of session (out of 10): 10  RN informed on pain level     OBJECTIVE     Cognitive Status   Level of Consciousness   - alert  Arousal Alertness   - appropriate responses to stimuli  Affect/Behavior    - cooperative and pleasant  Orientation    - Oriented to: person, place and time  Functional Communication   - Overall Status: impaired   - Forms of Communication: verbal  Attention Span    - Attention: impaired - attends with cues to redirect   - Attention impairment: distractibility  Following Direction   - follows multistep commands with repetition  Executive Function    - Organization: impaired   - Problem Solving: impaired   - Execution: impaired  Memory   - intact  Awareness of Deficits   - decreased awareness of deficits and assistance required to compensate for deficits    Vitals:  Blood Pressure (mmhg): 124/80  Pulse Ox (%): 96  Heart Rate (beats per minute): 66    Patient Activity Tolerance: no rest required    Hand Dominance: left-handed    Incision/Wound:   - Location: Pt reports redness/irritation to vaginal area from external cath-RN notified       Range of Motion (ROM)   (degrees unless noted; active unless noted; norms in ( ); negative=lacking to 0, positive=beyond 0)  WNL: LUE, RUE    Strength  (out of 5 unless noted, standard test position unless noted)   5/5: LUE,  RUE      Sitting Balance  (KIM = base of support)  Static      - Trial 1 details: independent  Dynamic      - Trial 1 details: modified independent and reaches with 2 hands    Standing Balance  (KIM = base of support)  Firm Surface: Double Leg      - Static, Eyes Open       - Trial 1 details: modified independent and with double UE support     - Dynamic, Eyes Open       - Trial 1 details: reaches with one hand, with single UE support and modified independent       Transfers  Assistive devices: gait belt, 2-wheeled walker  - Sit to stand: stand by assist, with verbal cues (VCs for hand placement )  - Stand to sit: stand by assist, with verbal cues (VCs for hand placement )      Functional Ambulation  - Assistance: stand by assist and with verbal cues (VCs to manage O2 line)  - Assistive device: gait belt and 2-wheeled walker  - Distance (ft):10; 10  Activities of Daily Living (ADLs)  Eating:   - Assist: independent  Grooming/Oral Hygiene:   - Grooming assist: modified independent  - Position: standing at sink  Upper Body Dressing:  - Assist: stand by assist (SBA for item retrieval )  Lower Body Dressing:   - Assist: minimal assist (assist with socks and tubigrips-PLOF)  Toileting:   - Toilet transfer:        - Assist: stand by assist and with verbal cues (VCs for hand placement)       - Device: gait belt and 2-wheeled walker       - Equipment: grab bar use  - Assist: modified independent  Bathing:   - Assist: supervision  - Equipment: tub chair with back rest, grab bar and hand held shower (foot scrubber on shower floor)  Interventions    Training provided: ADL training, compensatory techniques, safety training, use of adaptive equipment and transfer trainingRehab Safety  Therapist donned the following PPE for this patient encounter:  Gloves and Surgical Mask    Therapy aide or other healthcare professional present during this patient encounter:   No  If yes, that healthcare professional wore the following PPE: Not  Applicable    The patient was wearing a mask during this session:  No      Skilled input: verbal instruction/cues  Verbal Consent: Writer verbally educated and received verbal consent for hand placement, positioning of patient, and techniques to be performed today from patient for clothing adjustments for techniques, hand placement and palpation for techniques and therapist position for techniques as described above and how they are pertinent to the patient's plan of care.         ASSESSMENT  Impairments: safety awareness  Functional Limitations: functional mobility, functional transfers, showering and dressing     Discharge Recommendations:   Recommendations for Discharge: OT IL: Patient requires  intermittent assistance to perform mobility and/or ADLs safely, Patient is appropriate for Occupational Therapy 1-3 times per week                 • Skilled therapy is required to address these limitations in attempt to maximize the patient's independence.     • Personal Occupations Profile Affected: bathing/showering, functional mobility/transfers, lower body dressing, meal preparation/cleanup     • Clinical decision making: Low - Patient has few limitations (1-3), comorbidities and/or complexities, as noted in problem focused assessment noted above, that impact their occupational profile.  Resulting in few treatment options and no task modification consistent with low clinical decision making complexity.    Education:   - Present and ready to learn: patient    Patient at End of Session:   Location: in chair  Safety measures: alarm system in place/re-engaged, lines intact, equipment intact and call light within reach  Handoff to: nurse    PLAN  Suggestions for next session as indicated: OT Frequency: 3-5 x per week     Interventions: ADL retraining, compensatory techniques, functional transfer training, safety training, use of adaptive equipment and patient education AM-PAC Cognition Screen:  Cognition Score:  17/24  Cognition Interpretation: current level of function is consistent with baseline    Agreement to plan and goals: patient agrees with goals and treatment plan      GOALS  Long Term Goals: (to be met by time of discharge from hospital)  Bathing: Patient will complete bathing using tub chair with back rest, grab bar and hand held shower, modified independent Toilet transfer: Patient will complete toilet transfer with 2-wheeled walker, grab bar use, modified independent.   Walk in shower: Patient will complete walk in shower transfer with 2-wheeled walker, modified independent.   Item retrieval: Patient will complete item retrieval modified independent.         Documented in the chart in the following areas: Prior Level of Function. Assessment. Plan.      Therapy procedure time and total treatment time can be found documented on the Time Entry flowsheet   [FreeTextEntry1] : 81-year-old female with right ear pain and drainage that began in April 2022. Has history of diabetes mellitus and moderate dementia. Also has concern for right throat lesion and is set for DL/biopsy. Has been treated with antibiotic drops (ciprodex) without improvement. Exam in ENT office with Dr. Pereyra showed normal bilateral facial nerve function, polyp formation in right ear canal with significant diffuse edema and purulence. Left ear canal patent and left tympanic membrane intact without effusion or retraction. \par A1c 6.1 \par PCP: Dr Rueda\par \par EKG 6/21/22: QTc 430 Normal sinus rhythm Possible Anterior infarct , age undetermined T wave abnormality, consider lateral ischemia Abnormal ECG No previous ECGs available Confirmed by DANELLE HERBERT MD ([id number]) on 6/22/2022 12:09:03 PM \par \par pt presents to the office with prolonged hx of right ear drainage and pain. She has been to several ER recently since April. Was found to have a parietal stroke at [facility]. Was also at [facility] where they gave her IV abx for her otitis oxterna. She most recently went to ENT and was started on augmentin and clindaycin. a culture taken at that time grew pseudomonas and coag neg staph \par \par I have uploaded the discharge summaries from those hospitalizations \par \par

## 2023-05-25 NOTE — DISCHARGE NOTE ADULT - MODE OF TRANSPORTATION
How Severe Is Your Eczema?: moderate
Is This A New Presentation, Or A Follow-Up?: Rash
Wheelchair/Stroller

## 2024-03-21 NOTE — ED ADULT TRIAGE NOTE - MODE OF ARRIVAL
Advance Care Planning     Advance Care Planning Activator (Inpatient)  Conversation Note      Date of ACP Conversation: 3/21/2024     Conversation Conducted with: Patient with Decision Making Capacity    ACP Activator: Isabel Mesa RN      Health Care Decision Maker:     Current Designated Health Care Decision Maker:     Primary Decision Maker: Maru Mendez - Child - [phone]    Primary Decision Maker: Ryanne Russell - Child - [phone]    Primary Decision Maker: BambiOj - Child - [phone]  Click here to complete Healthcare Decision Makers including section of the Healthcare Decision Maker Relationship (ie \"Primary\")  Today we documented Decision Maker(s) consistent with Legal Next of Kin hierarchy.    Care Preferences    Ventilation:  \"If you were in your present state of health and suddenly became very ill and were unable to breathe on your own, what would your preference be about the use of a ventilator (breathing machine) if it were available to you?\"      Would the patient desire the use of ventilator (breathing machine)?: yes    \"If your health worsens and it becomes clear that your chance of recovery is unlikely, what would your preference be about the use of a ventilator (breathing machine) if it were available to you?\"     Would the patient desire the use of ventilator (breathing machine)?: \"I don't know.\"      Resuscitation  \"CPR works best to restart the heart when there is a sudden event, like a heart attack, in someone who is otherwise healthy. Unfortunately, CPR does not typically restart the heart for people who have serious health conditions or who are very sick.\"    \"In the event your heart stopped as a result of an underlying serious health condition, would you want attempts to be made to restart your heart (answer \"yes\" for attempt to resuscitate) or would you prefer a natural death (answer \"no\" for do not attempt to resuscitate)?\" yes       [] Yes   [] No   Educated Patient /  Walk in

## 2024-07-27 NOTE — ASU PATIENT PROFILE, ADULT - NS SC CAGE ALCOHOL GUILTY ABOUT
Occupational Therapy    Patient not seen in therapy.       OT order received and acknowledged; patient's chart has been reviewed.     Discussed patient's functional abilities and weight bearing precaution with patient. He has found ways to compensate for ADL and mobility tasks. He has a spouse that will provide assist with IADL tasks like driving and cooking. Patient is eager to leave and return home.    No further acute OT needs at this time, no OT discharge needs anticipated. Recommend that patient sees PT for stairs navigation and mobility training, patient agreeable.                                     no

## 2024-12-12 NOTE — PROGRESS NOTE ADULT - NSHPATTENDINGPLANDISCUSS_GEN_ALL_CORE
TIMBO araiza and the daughter Georgie.
pt
pt
pt and TIMBO Blancas, d/w pt's RN
pt and TIMBO Blancas, d/w pt's RN, d/w the daughter Georgie
pt and the daughter Georgie. plan updated. all questions answered.
Normal

## 2024-12-13 NOTE — ED ADULT TRIAGE NOTE - PAIN: PRESENCE, MLM
Travis can complete the forms..    [phone]   rt ear pain/complains of pain/discomfort

## 2025-01-24 NOTE — H&P PST ADULT - NSICDXPASTMEDICALHX_GEN_ALL_CORE_FT
[facility] Neurosurgery    Postoperative care following spine surgery  Dear Patient,  You have recently undergone spine surgery (C5-6 ACDF) and are now ready to go home. These written instructions are intended to help you to recover quickly.  If you have ANY QUESTIONS about your condition prior to discharge please ask Dr. Taylor. In particular, if you have concerns about going home discuss them now. We do not want you to go until you are completely comfortable leaving the hospital.   If you have ANY QUESTIONS about your condition after you go home call your doctor. The number is [phone] which is answered 24 hours a day. During regular working hours a  will connect you to your doctor, one of his partners, or one of our nurses. At night or on weekends the answering service will connect you with the physician on call. DO NOT HESITATE to call. We want to help you with any problems.     Deep vein thrombosis/ pulmonary embolus  Some patients who undergo surgery develop blood clots in the veins of the legs. These clots can cause pain or swelling in the legs, or may cause no obvious problem. They can break free from the legs and travel to the lungs causing shortness of breath and/or chest pain.you develop pain or swelling in your legs after surgery, call your doctor. If you develop breathing problems or chest pain after surgery, call 241.    Neurological Deficit  Neurological deficits are problems with brain function like speech difficulty, weakness, numbness, imbalance, etc. These deficits may be present before or after spine surgery. Prior to discharge your doctor will make sure that all treatment needed to help you recover from such deficits has been instituted. He will also make sure that these deficits are stable or improving. After you go home, if you think any of your spine problems are getting worse, not better, it may be a sign of bleeding, infection, or other problems. Call your doctor. He  will order tests and prescribe treatment as needed.    Activity Restrictions  In general after surgery you will be on restricted activities for several weeks to several months depending on the nature of your operation. For six weeks you should avoid heavy lifting (over 8 lbs or roughly a gallon of milk), bending, or stooping. You may be released by Dr. Taylor earlier. You may return to driving when you feel comfortable enough that you can safely handle your vehicle AND you are not taking narcotic pain medications. This may be as early as 10 - 14 days, but could be longer as instructed by your neurosurgeon. After surgery you may gradually increase your activities, as you are able to tolerate. Walking is an excellent low impact exercise to begin after surgery. You should try to make regular walks of 15 to 30 minutes part of your postoperative recovery as you become able to do so. It typically requires a period of days to a few weeks to reach this level of activity and should be done in a gradual fashion. Your return to work will depend on your job requirements. In general, you may return to light duty work as soon as you feel comfortable and are not taking routine narcotic pain medications.    Medication  It is important to take your medication EXACTLY as prescribed. Some patients are reluctant to take pain medication. It is perfectly fine to take pain medication for several weeks after surgery. We want to eliminate pain whenever possible. Many pain medications can cause nausea (sick to your stomach), constipation (inability to poop), or itching. Nausea may be minimized by taking the medication with food. Constipation can be relieved by taking stool softeners and/ or laxatives that you can purchase over the counter as needed.    It is important to realize that no pain after surgery is an unrealistic expectation.  Pain medication will never reduce your pain score to zero.  The goal of pain medicine is to reduce your  pain to the point you can move, take care of yourself, and participate in therapy.  Make sure to work with your caregiver to determine what is an adequate level of pain control to promote healthy movement and then take your medication to reach this goal.      You have undergone a Single level ACDF (2) surgery which you are expected to recover from over several weeks.     Use of opioids immediately following surgery is often necessary.  Pain after surgery results in decreased quality of life, surgical complications, and prolonged rehabilitation.  Thus in certain situations, the benefits of a limited course of opioids may outweigh the risk.      However, multiple studies have found that patients of all ages frequently take fewer opioid pills than the amount prescribed after common surgeries.  In some cases they do not take any of the prescribed medications at all.  This results in excess opioid pills that are accessible to others, raise a concern for misuse, can be stolen by family members, can result in later addiction, or can often be used in overdose.  Therefore we are going to make every effort to only prescribe as much pain medication as necessary to limit the amount of pills that are left over after you recover.      First-line treatment should include nonopioid analgesics.  Examples of these would be Tylenol or nonsteroidal anti-inflammatories such as ibuprofen or Aleve.  - Tylenol 1000 mg every six hours as needed    Second-line treatment. If the above first-line treatments are insufficient to control your pain you have also been provided a small dose of opioids.  In order to avoid addiction you should take the lowest amount possible.    Type II: Opioid prescription for 7 days or less (every 6 hours).  May consider an additional 7-day course (every 8 hours)    EXAMPLE IF APPLICABLE:  Please taper your pain medications to avoid long term addiction.  Week 1: Take your pain medication no more than ever 6 hours as  needed for severe pain.  Week 2: Take your pain medication no more than every 8 hours as needed for severe pain.  Week 3: Take your pain medication no more than every 12 hours as needed for unresolved pain.    These recommendations are based on ...  CDC guidelines for control and prevention of postsurgical pain,   [place] opioid prescribing engagement network (OPEN),   Siobhan weeks in [facility] medical directors group prescribing opioids for postoperative pain-supplemental guidance (2018)    Wound Care  Your incision is held together with dissolvable sutures that do not need to be removed.     Seventy-two hours after surgery it is OK to get the wound wet, so you can take a shower or bath.     You do not need to put any medication (like Neosporin or Vitamin E) on the wound. Scrubbing the wound should be avoided until the staples nondissolvable sutures come out.     Heating pads have the potential to cause very serious burns, especially in patients using narcotic pain medications (e.g. Oxycodone, Oxycontin, etc.) Do not use heating pads during your recovery.      No nicotine products, including second-hand smoke, gum or patches. Nicotine will delay healing and increase the likelihood of a surgical complication. For help quitting, call the Quitline: 1-969.928.4911     Potential wound problems include the following:  Infection--If the wound becomes red, tender, swollen, or warm it may be infected. Infection is often accompanied by fever. If you think your wound might be infected you should call your doctor. Often you can send us a picture of the wound so we can better evaluate it.   Drainage--Fluid should not drain from your wound. If it does, call your doctor. Colored fluid may indicate infection. Clear fluid may indicate leakage of spinal fluid.   Dehiscence--If the wound does not heal properly it may open up along the staple line. This is called dehiscence. Call us immediately.    Sutures--Occasionally, one of the buried threads (sutures) may work through the skin. If you think this has happened call your doctor.   Swelling--Spinal fluid or blood may collect under the skin. This is usually harmless, but needs to be evaluated. Call your doctor.     How to contact your doctor  Dr. Taylor and his team did your surgery and, therefore, are likely to know more about your condition than any other physicians. We are immediately available to help you with any problems after surgery. Please call us for any concerns at the following numbers:  Doctor’s office:  [phone] (answered 24 hours a day)   [facility] : [phone] (alternative emergency number for on-call neurosurgeon)     Specific instructions related to your surgery  Diet: no restrictions, eat a heart healthy diet. If you are diabetic, tightly controlling your blood sugar over the next 2 weeks is crucial in controlling infection.     Activity: as tolerated, no heavy lifting or strenuous exercise for at least 2 weeks.    Brace/collar instructions: Cervical collar should be worn at all times except while bathing.  Please shower daily allowing clean soapy water to cleanse wound.  If you have had a posterior cervical fusion, place a clean dry gauze over your incision before putting cervical collar on.    Please do not use NSAIDs (Ibuprofen, Toradol, etc) for 4 weeks following spinal fusion, as this can prevent bone growth. Tylenol is acceptable as an over the counter pain management.     Follow up:   Follow up with Dylan NORMAN on 2/6/2025 for post op wound check and with Dr. Taylor in the neurosurgery clinic ([phone]) on 3/5/2025.  We have scheduled these appointment(s) for you.  X-rays of the cervical spine prior to arrival.             Sincerely,        Jem Taylor MD, PhD, MPH    PAST MEDICAL HISTORY:  Alzheimers disease     Diabetes mellitus type II     Dyslipidemia     HTN     Stroke 5/9/2022     PAST MEDICAL HISTORY:  Alzheimers disease     Diabetes mellitus type II     Dyslipidemia     Fracture of hip and knee, right-12/31/2021    Hearing loss     History of diverticulitis     HTN     Mass of right ear canal     Stroke 5/9/2022

## (undated) DEVICE — Device

## (undated) DEVICE — POSITIONER STRAP ARMBOARD VELCRO TS-30

## (undated) DEVICE — LABELS BLANK W PEN

## (undated) DEVICE — SYR LUER LOK 3CC

## (undated) DEVICE — DRSG TELFA 3 X 8

## (undated) DEVICE — GLV 8 PROTEXIS (WHITE)

## (undated) DEVICE — TUBING SUCTION NONCONDUCTIVE 6MM X 12FT

## (undated) DEVICE — DRSG CURITY GAUZE SPONGE 4 X 4" 12-PLY

## (undated) DEVICE — DRAPE 3/4 SHEET 52X76"

## (undated) DEVICE — SOL ANTI FOG

## (undated) DEVICE — GLV 7.5 PROTEXIS (WHITE)